# Patient Record
Sex: FEMALE
[De-identification: names, ages, dates, MRNs, and addresses within clinical notes are randomized per-mention and may not be internally consistent; named-entity substitution may affect disease eponyms.]

---

## 2017-12-15 ENCOUNTER — CHARTING TRANS (OUTPATIENT)
Dept: OTHER | Age: 57
End: 2017-12-15

## 2017-12-18 ENCOUNTER — CHARTING TRANS (OUTPATIENT)
Dept: OTHER | Age: 57
End: 2017-12-18

## 2018-01-30 ENCOUNTER — NURSE TRIAGE (OUTPATIENT)
Dept: OTHER | Age: 58
End: 2018-01-30

## 2018-01-30 ENCOUNTER — OFFICE VISIT (OUTPATIENT)
Dept: FAMILY MEDICINE CLINIC | Facility: CLINIC | Age: 58
End: 2018-01-30

## 2018-01-30 VITALS
HEART RATE: 72 BPM | HEIGHT: 67 IN | DIASTOLIC BLOOD PRESSURE: 76 MMHG | SYSTOLIC BLOOD PRESSURE: 115 MMHG | TEMPERATURE: 98 F | BODY MASS INDEX: 27.47 KG/M2 | WEIGHT: 175 LBS

## 2018-01-30 DIAGNOSIS — J32.9 OTHER SINUSITIS, UNSPECIFIED CHRONICITY: Primary | ICD-10-CM

## 2018-01-30 PROCEDURE — 99212 OFFICE O/P EST SF 10 MIN: CPT | Performed by: FAMILY MEDICINE

## 2018-01-30 PROCEDURE — 99202 OFFICE O/P NEW SF 15 MIN: CPT | Performed by: FAMILY MEDICINE

## 2018-01-30 RX ORDER — CODEINE PHOSPHATE AND GUAIFENESIN 10; 100 MG/5ML; MG/5ML
10 SOLUTION ORAL EVERY 6 HOURS PRN
Qty: 236 ML | Refills: 0 | Status: SHIPPED | OUTPATIENT
Start: 2018-01-30 | End: 2018-03-07

## 2018-01-30 RX ORDER — AMOXICILLIN AND CLAVULANATE POTASSIUM 875; 125 MG/1; MG/1
1 TABLET, FILM COATED ORAL 2 TIMES DAILY
Qty: 20 TABLET | Refills: 0 | Status: SHIPPED | OUTPATIENT
Start: 2018-01-30 | End: 2018-02-09

## 2018-01-30 NOTE — TELEPHONE ENCOUNTER
Action Requested: Summary for Provider     []  Critical Lab, Recommendations Needed  [] Need Additional Advice  []   FYI    []   Need Orders  [] Need Medications Sent to Pharmacy  []  Other     SUMMARY: Pt stts has a productive cough,fever and nasal conges

## 2018-01-30 NOTE — PROGRESS NOTES
Blood pressure 115/76, pulse 72, temperature 98 °F (36.7 °C), temperature source Oral, height 5' 7\" (1.702 m), weight 175 lb (79.4 kg).     Patient presents today complaining of a one-week history of nocturnal cough some facial pressure fever 103 yesterday

## 2018-03-06 ENCOUNTER — APPOINTMENT (OUTPATIENT)
Dept: CT IMAGING | Facility: HOSPITAL | Age: 58
End: 2018-03-06
Attending: NURSE PRACTITIONER
Payer: MEDICAID

## 2018-03-06 ENCOUNTER — HOSPITAL ENCOUNTER (EMERGENCY)
Facility: HOSPITAL | Age: 58
Discharge: HOME | End: 2018-03-06
Payer: MEDICAID

## 2018-03-06 ENCOUNTER — NURSE TRIAGE (OUTPATIENT)
Dept: OTHER | Age: 58
End: 2018-03-06

## 2018-03-06 VITALS
SYSTOLIC BLOOD PRESSURE: 120 MMHG | RESPIRATION RATE: 18 BRPM | BODY MASS INDEX: 27.49 KG/M2 | DIASTOLIC BLOOD PRESSURE: 66 MMHG | HEART RATE: 62 BPM | OXYGEN SATURATION: 99 % | TEMPERATURE: 98 F | WEIGHT: 165 LBS | HEIGHT: 65 IN

## 2018-03-06 DIAGNOSIS — S09.90XA INJURY OF HEAD, INITIAL ENCOUNTER: Primary | ICD-10-CM

## 2018-03-06 PROCEDURE — 99284 EMERGENCY DEPT VISIT MOD MDM: CPT

## 2018-03-06 PROCEDURE — 72125 CT NECK SPINE W/O DYE: CPT | Performed by: NURSE PRACTITIONER

## 2018-03-06 PROCEDURE — 70450 CT HEAD/BRAIN W/O DYE: CPT | Performed by: NURSE PRACTITIONER

## 2018-03-06 NOTE — ED INITIAL ASSESSMENT (HPI)
Pt reports being hit on the head with a football. Pt reports feeling dizzy, and has headache.  Pt denies LOC at that time

## 2018-03-06 NOTE — TELEPHONE ENCOUNTER
Last night at 6:30. Pt hit her head football the side of her head. 2 advil and ice Pt feels confused, nausous and dizzy when she walks. There is not bump but its painful.

## 2018-03-06 NOTE — ED NOTES
Practitioner at bedside to discuss results. Pt up ambulatory with steady gait, awaiting discharge paperwork.

## 2018-03-06 NOTE — TELEPHONE ENCOUNTER
Action Requested: Summary for Provider     []  Critical Lab, Recommendations Needed  [] Need Additional Advice  []   FYI    []   Need Orders  [] Need Medications Sent to Pharmacy  []  Other     SUMMARY: Pt will go to ER. ER f/u tomorrow.    Pt stated that l

## 2018-03-07 ENCOUNTER — OFFICE VISIT (OUTPATIENT)
Dept: FAMILY MEDICINE CLINIC | Facility: CLINIC | Age: 58
End: 2018-03-07

## 2018-03-07 VITALS
TEMPERATURE: 98 F | HEART RATE: 70 BPM | HEIGHT: 67 IN | SYSTOLIC BLOOD PRESSURE: 126 MMHG | DIASTOLIC BLOOD PRESSURE: 76 MMHG

## 2018-03-07 DIAGNOSIS — G44.319 ACUTE POST-TRAUMATIC HEADACHE, NOT INTRACTABLE: ICD-10-CM

## 2018-03-07 DIAGNOSIS — S06.0X0D CONCUSSION WITHOUT LOSS OF CONSCIOUSNESS, SUBSEQUENT ENCOUNTER: Primary | ICD-10-CM

## 2018-03-07 PROCEDURE — 99214 OFFICE O/P EST MOD 30 MIN: CPT | Performed by: NURSE PRACTITIONER

## 2018-03-07 RX ORDER — ACETAMINOPHEN AND CODEINE PHOSPHATE 300; 30 MG/1; MG/1
1 TABLET ORAL EVERY 6 HOURS PRN
Qty: 30 TABLET | Refills: 0 | Status: SHIPPED | OUTPATIENT
Start: 2018-03-07 | End: 2018-04-09 | Stop reason: ALTCHOICE

## 2018-03-07 NOTE — PATIENT INSTRUCTIONS
Concussion    A concussion can be caused by a direct blow to the head, neck, face, or somewhere else on the body with the force being transmitted to the head. This may cause you to lose consciousness – be \"knocked out\" - but not always.  Depending on th ¨ Don’t drink alcohol or take sedatives or medicines that make you sleepy. ¨ Don’t drive or operate machinery. ¨ Avoid doing anything strenuous. Don’t lift or strain.   · Don’t return to sports or any activity that could cause you to hit your head until a You have been seen in the Emergency Department today for a head injury and diagnosed with a concussion. A head injury of any severity can cause you to have a concussion. A concussion is an injury to your brain that usually heals by itself with time.  Most p Stage 5 (Full contact sport): You MUST be seen by a healthcare provider (family physician, nurse practitioner, etc.), before participating high risk sports such as football, hockey, soccer, basketball, gymnastics.     Tips to help you get better:    Get ple · Don't do activities that need a lot of concentration or a lot of attention. This will allow your brain to rest and heal more quickly. · Return to regular physical and mental activity as directed and approved by your healthcare provider.   Tips about slee

## 2018-03-07 NOTE — PROGRESS NOTES
HPI  Pt presents for f/u from ER yesterday-was hit in right side of head by a thrown football. Pt feeling very fatigued, weak, headache and at times disoriented and dizziness with nausea. Having a hard time sleeping due to pain in head.      Review of Syste status: Current Every Day Smoker  0.50 Packs/day     Smokeless tobacco: Never Used    Alcohol use No    Drug use: No    Sexual activity: Not on file     Other Topics Concern   None on file     Social History Narrative   None on file         Current Outpati is normal. Her mood appears anxious. Her affect is labile. She exhibits a depressed mood. She exhibits normal recent memory and normal remote memory. Nursing note and vitals reviewed. Assessment:     1.  Concussion without loss of consciousness, subs

## 2018-03-07 NOTE — TELEPHONE ENCOUNTER
Pt stated that she did go to ER and was inform that she had a concussion. She already has a appt to see Anne Marie Rafat today someone will drive her to the appt.

## 2018-03-12 ENCOUNTER — OFFICE VISIT (OUTPATIENT)
Dept: FAMILY MEDICINE CLINIC | Facility: CLINIC | Age: 58
End: 2018-03-12

## 2018-03-12 ENCOUNTER — NURSE TRIAGE (OUTPATIENT)
Dept: FAMILY MEDICINE CLINIC | Facility: CLINIC | Age: 58
End: 2018-03-12

## 2018-03-12 VITALS
HEART RATE: 80 BPM | DIASTOLIC BLOOD PRESSURE: 75 MMHG | TEMPERATURE: 98 F | SYSTOLIC BLOOD PRESSURE: 118 MMHG | BODY MASS INDEX: 27 KG/M2 | WEIGHT: 172 LBS | HEIGHT: 67 IN | RESPIRATION RATE: 18 BRPM

## 2018-03-12 DIAGNOSIS — S06.0X0D CONCUSSION WITHOUT LOSS OF CONSCIOUSNESS, SUBSEQUENT ENCOUNTER: ICD-10-CM

## 2018-03-12 DIAGNOSIS — S09.90XA TRAUMATIC INJURY OF HEAD, INITIAL ENCOUNTER: ICD-10-CM

## 2018-03-12 DIAGNOSIS — S00.93XD CONTUSION OF HEAD, UNSPECIFIED PART OF HEAD, SUBSEQUENT ENCOUNTER: Primary | ICD-10-CM

## 2018-03-12 DIAGNOSIS — G44.209 TENSION HEADACHE: ICD-10-CM

## 2018-03-12 DIAGNOSIS — M54.2 BILATERAL POSTERIOR NECK PAIN: ICD-10-CM

## 2018-03-12 PROCEDURE — 99212 OFFICE O/P EST SF 10 MIN: CPT | Performed by: FAMILY MEDICINE

## 2018-03-12 PROCEDURE — 99215 OFFICE O/P EST HI 40 MIN: CPT | Performed by: FAMILY MEDICINE

## 2018-03-12 RX ORDER — NABUMETONE 750 MG/1
750 TABLET, FILM COATED ORAL 2 TIMES DAILY
Qty: 60 TABLET | Refills: 0 | Status: SHIPPED | OUTPATIENT
Start: 2018-03-12 | End: 2018-03-19

## 2018-03-12 RX ORDER — CYCLOBENZAPRINE HCL 10 MG
10 TABLET ORAL NIGHTLY
Qty: 30 TABLET | Refills: 0 | Status: SHIPPED | OUTPATIENT
Start: 2018-03-12 | End: 2018-03-19

## 2018-03-12 RX ORDER — IBUPROFEN 200 MG
400 TABLET ORAL EVERY 4 HOURS PRN
COMMUNITY
End: 2018-03-19

## 2018-03-12 NOTE — TELEPHONE ENCOUNTER
Action Requested: Summary for Provider     []  Critical Lab, Recommendations Needed  [] Need Additional Advice  []   FYI    []   Need Orders  [] Need Medications Sent to Pharmacy  []  Other     SUMMARY: APPT CREATED, headache, dizziness, recent concussion

## 2018-03-12 NOTE — PROGRESS NOTES
Patient ID: Rut Meza is a 62year old female. HPI  Patient presents with:  Headache: Patient present for c/o headaches, pt had recent concussion. Pain 8/10 to headache.  Pt c/o insomnia     Action Requested: Summary for Provider     []  Critical L Pt c/o a 7/10 headache that began yesterday at about 6:30 PM after she was hit in the head with a football. 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 Reviewed the emergency room note. She was going to call him on as she is a nanny for 3 curls. She was going to pick someone up when 2 teenagers were in the building playing football.   There was a small hard football and she walked right into their throw Psychiatric/Behavioral: Positive for decreased concentration and sleep disturbance (hartd to get comfortable. ). Negative for dysphoric mood. The patient is not nervous/anxious.           Past Medical History:   Diagnosis Date   • Hyperlipidemia        Past Pulmonary/Chest: Effort normal and breath sounds normal. No respiratory distress. Lymphadenopathy:     Patient has no cervical adenopathy.    Neurological: Patient is alert and oriented to person, place, and time   Patient has normal strength and normal r -     Nabumetone 750 MG Oral Tab; Take 1 tablet (750 mg total) by mouth 2 (two) times daily. Take with meals. (for pain/inflammation). Return in about 1 week (around 3/19/2018).       Referrals (if applicable)  No orders of the defined types were placed

## 2018-03-19 ENCOUNTER — OFFICE VISIT (OUTPATIENT)
Dept: FAMILY MEDICINE CLINIC | Facility: CLINIC | Age: 58
End: 2018-03-19

## 2018-03-19 DIAGNOSIS — S06.0X0D CONCUSSION WITHOUT LOSS OF CONSCIOUSNESS, SUBSEQUENT ENCOUNTER: ICD-10-CM

## 2018-03-19 DIAGNOSIS — R51.9 RIGHT-SIDED HEADACHE: ICD-10-CM

## 2018-03-19 DIAGNOSIS — M54.2 NECK PAIN: ICD-10-CM

## 2018-03-19 DIAGNOSIS — H57.11 ACUTE RIGHT EYE PAIN: ICD-10-CM

## 2018-03-19 DIAGNOSIS — G44.209 TENSION HEADACHE: Primary | ICD-10-CM

## 2018-03-19 PROBLEM — S06.0X0A CONCUSSION WITH NO LOSS OF CONSCIOUSNESS: Status: ACTIVE | Noted: 2018-03-19

## 2018-03-19 PROCEDURE — 99212 OFFICE O/P EST SF 10 MIN: CPT | Performed by: FAMILY MEDICINE

## 2018-03-19 PROCEDURE — 99215 OFFICE O/P EST HI 40 MIN: CPT | Performed by: FAMILY MEDICINE

## 2018-03-19 RX ORDER — AMITRIPTYLINE HYDROCHLORIDE 10 MG/1
TABLET, FILM COATED ORAL
Qty: 60 TABLET | Refills: 1 | Status: SHIPPED | OUTPATIENT
Start: 2018-03-19 | End: 2018-04-09 | Stop reason: ALTCHOICE

## 2018-03-19 RX ORDER — DICLOFENAC SODIUM 75 MG/1
75 TABLET, DELAYED RELEASE ORAL 2 TIMES DAILY
Qty: 42 TABLET | Refills: 0 | Status: SHIPPED | OUTPATIENT
Start: 2018-03-19 | End: 2018-04-09

## 2018-03-19 NOTE — PROGRESS NOTES
Patient ID: Renata Baez is a 62year old female. HPI  No chief complaint on file. She is here to follow-up on her with right-sided head pain after she was hit with a football on the right temple.   She states she has a pulling sensation in her rig SURGERY      Comment: low back surgery for herniated disc.    1997: LAPAROSCOPIC CHOLECYSTECTOMY  1998: SINUS SURGERY        Comment: nasal sinus surgery  1967: TONSILLECTOMY      Comment: and adenoidectomy         Current Outpatient Prescriptions: total) by mouth 2 (two) times daily. Take with meals. (for pain/inflammation). -     Amitriptyline HCl 10 MG Oral Tab; 1 by mouth at nighttime. Can start taking 2 at nighttime if needed after 1 week.   -     NEURO - INTERNAL  Try diclofenac and amitriptyl INTERNAL  -     NEURO - INTERNAL        Referrals (if applicable)    Orders Placed This Encounter      Ophthalmology - Dr Robert Felipe Comments:              ([address])          Referral Priority: Routine          Referral Type: E/M Services

## 2018-03-20 VITALS
HEIGHT: 67 IN | HEART RATE: 76 BPM | BODY MASS INDEX: 26.21 KG/M2 | TEMPERATURE: 98 F | DIASTOLIC BLOOD PRESSURE: 79 MMHG | SYSTOLIC BLOOD PRESSURE: 131 MMHG | WEIGHT: 167 LBS

## 2018-03-20 RX ORDER — PREDNISONE 5 MG/1
TABLET ORAL
Refills: 0 | COMMUNITY
Start: 2018-01-17 | End: 2018-04-09 | Stop reason: ALTCHOICE

## 2018-03-21 ENCOUNTER — OFFICE VISIT (OUTPATIENT)
Dept: OPHTHALMOLOGY | Facility: CLINIC | Age: 58
End: 2018-03-21

## 2018-03-21 DIAGNOSIS — H25.13 AGE-RELATED NUCLEAR CATARACT OF BOTH EYES: ICD-10-CM

## 2018-03-21 DIAGNOSIS — S06.0X0D CONCUSSION WITHOUT LOSS OF CONSCIOUSNESS, SUBSEQUENT ENCOUNTER: ICD-10-CM

## 2018-03-21 DIAGNOSIS — H57.11 EYE PAIN, RIGHT: Primary | ICD-10-CM

## 2018-03-21 PROCEDURE — 99243 OFF/OP CNSLTJ NEW/EST LOW 30: CPT | Performed by: OPHTHALMOLOGY

## 2018-03-21 PROCEDURE — 99212 OFFICE O/P EST SF 10 MIN: CPT | Performed by: OPHTHALMOLOGY

## 2018-03-21 NOTE — ASSESSMENT & PLAN NOTE
No cause found for right eye pain. Reassured patient that there is no infection, inflammation, foreign body, abrasion or increased pressure in the eyes. Continue to follow up PCP and see neurology as planned.   She has an appointment with neurology on

## 2018-03-21 NOTE — PATIENT INSTRUCTIONS
Eye pain, right   No cause found for right eye pain. Reassured patient that there is no infection, inflammation, foreign body, abrasion or increased pressure in the eyes. Continue to follow up PCP and see neurology as planned.   She has an appointment w

## 2018-03-21 NOTE — PROGRESS NOTES
Tiburcio Louise is a 62year old female. HPI:     HPI     Consult    Additional comments: Per Dr. Sona Pink   NP.   Pt states that she got hit with a football on the right side of her head 2 weeks ago Pt went to [facility] and was told s after 1 week. Disp: 60 tablet Rfl: 1   Acetaminophen-Codeine (TYLENOL WITH CODEINE #3) 300-30 MG Oral Tab Take 1 tablet by mouth every 6 (six) hours as needed for Pain.  Disp: 30 tablet Rfl: 0       Allergies:  No Known Allergies    ROS:     ROS     Positiv Pt is happy with reading glasses                 ASSESSMENT/PLAN:     Diagnoses and Plan:     Eye pain, right   No cause found for right eye pain.   Reassured patient that there is no infection, inflammation, foreign body, abrasion or increased pressure

## 2018-04-09 ENCOUNTER — OFFICE VISIT (OUTPATIENT)
Dept: FAMILY MEDICINE CLINIC | Facility: CLINIC | Age: 58
End: 2018-04-09

## 2018-04-09 ENCOUNTER — LAB ENCOUNTER (OUTPATIENT)
Dept: LAB | Age: 58
End: 2018-04-09
Attending: FAMILY MEDICINE
Payer: MEDICAID

## 2018-04-09 VITALS
HEART RATE: 86 BPM | BODY MASS INDEX: 27 KG/M2 | WEIGHT: 169.19 LBS | TEMPERATURE: 98 F | SYSTOLIC BLOOD PRESSURE: 120 MMHG | DIASTOLIC BLOOD PRESSURE: 73 MMHG

## 2018-04-09 DIAGNOSIS — R51.9 RIGHT-SIDED HEADACHE: ICD-10-CM

## 2018-04-09 DIAGNOSIS — K29.70 GASTRITIS WITHOUT BLEEDING, UNSPECIFIED CHRONICITY, UNSPECIFIED GASTRITIS TYPE: ICD-10-CM

## 2018-04-09 DIAGNOSIS — G44.209 TENSION HEADACHE: ICD-10-CM

## 2018-04-09 DIAGNOSIS — M54.2 NECK PAIN: ICD-10-CM

## 2018-04-09 DIAGNOSIS — H57.11 PAIN IN RIGHT EYE: ICD-10-CM

## 2018-04-09 DIAGNOSIS — H57.11 ACUTE RIGHT EYE PAIN: ICD-10-CM

## 2018-04-09 DIAGNOSIS — B00.9 HERPES SIMPLEX: ICD-10-CM

## 2018-04-09 DIAGNOSIS — R51.9 HEADACHE, UNSPECIFIED HEADACHE TYPE: ICD-10-CM

## 2018-04-09 DIAGNOSIS — G44.209 TENSION HEADACHE: Primary | ICD-10-CM

## 2018-04-09 DIAGNOSIS — F41.9 ANXIETY: ICD-10-CM

## 2018-04-09 DIAGNOSIS — S06.0X0D CONCUSSION WITHOUT LOSS OF CONSCIOUSNESS, SUBSEQUENT ENCOUNTER: ICD-10-CM

## 2018-04-09 PROCEDURE — 86695 HERPES SIMPLEX TYPE 1 TEST: CPT

## 2018-04-09 PROCEDURE — 36415 COLL VENOUS BLD VENIPUNCTURE: CPT

## 2018-04-09 PROCEDURE — 86694 HERPES SIMPLEX NES ANTBDY: CPT

## 2018-04-09 PROCEDURE — 99212 OFFICE O/P EST SF 10 MIN: CPT | Performed by: FAMILY MEDICINE

## 2018-04-09 PROCEDURE — 86696 HERPES SIMPLEX TYPE 2 TEST: CPT

## 2018-04-09 PROCEDURE — 99215 OFFICE O/P EST HI 40 MIN: CPT | Performed by: FAMILY MEDICINE

## 2018-04-09 RX ORDER — IBUPROFEN 600 MG/1
600 TABLET ORAL AS NEEDED
COMMUNITY
End: 2018-04-09

## 2018-04-09 RX ORDER — OMEPRAZOLE 40 MG/1
40 CAPSULE, DELAYED RELEASE ORAL DAILY
Qty: 30 CAPSULE | Refills: 2 | Status: SHIPPED | OUTPATIENT
Start: 2018-04-09 | End: 2018-05-30

## 2018-04-09 RX ORDER — ACYCLOVIR 800 MG/1
800 TABLET ORAL 3 TIMES DAILY
Qty: 15 TABLET | Refills: 0 | Status: SHIPPED | OUTPATIENT
Start: 2018-04-09 | End: 2018-12-03

## 2018-04-09 NOTE — PROGRESS NOTES
Patient ID: Cely Deleon is a 62year old female. HPI  Patient presents with: Follow - Up    She called today and stated she wanted to be worked in to follow-up on her head injury.   She states she really needs a note to be able to drive the children compensation for her discomfort.     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 Negative for dizziness. Past Medical History:   Diagnosis Date   • Hyperlipidemia        Past Surgical History:  1970: APPENDECTOMY  1996: BACK SURGERY      Comment: low back surgery for herniated disc.    1997: LAPAROSCOPIC normal   Skin: Skin is warm. On the right buttock she has a quarter sized area of grouped vesicles. There is only one lesion. Psychiatric: Patient has a anxious affect. Patient speech is normal    Vitals reviewed.          ASSESSMENT/PLAN:     Diagnose

## 2018-04-12 RX ORDER — DICLOFENAC SODIUM 75 MG/1
TABLET, DELAYED RELEASE ORAL
Qty: 42 TABLET | Refills: 0 | Status: SHIPPED | OUTPATIENT
Start: 2018-04-12 | End: 2018-04-25

## 2018-04-12 NOTE — TELEPHONE ENCOUNTER
Refilled per protocol.   Refill Protocol Appointment Criteria  · Appointment scheduled in the past 6 months or in the next 3 months  Recent Outpatient Visits            3 days ago Tension headache    [address], [address], [place], [place]

## 2018-04-14 ENCOUNTER — TELEPHONE (OUTPATIENT)
Dept: OTHER | Age: 58
End: 2018-04-14

## 2018-04-14 NOTE — TELEPHONE ENCOUNTER
Spoke with patient and relayed VS message below--patient verbalizes understanding and agreement. F/U appt made for Thursday, 4/19/18 at 4:20 with VS to discuss.     Notes recorded by Tiffanie Mena DO on 4/12/2018 at 1:24 PM CDT  You can let patient know t

## 2018-04-19 ENCOUNTER — OFFICE VISIT (OUTPATIENT)
Dept: FAMILY MEDICINE CLINIC | Facility: CLINIC | Age: 58
End: 2018-04-19

## 2018-04-19 VITALS
BODY MASS INDEX: 26.53 KG/M2 | HEIGHT: 67 IN | HEART RATE: 89 BPM | SYSTOLIC BLOOD PRESSURE: 100 MMHG | DIASTOLIC BLOOD PRESSURE: 69 MMHG | WEIGHT: 169 LBS | TEMPERATURE: 98 F

## 2018-04-19 DIAGNOSIS — A60.00 GENITAL HERPES SIMPLEX, UNSPECIFIED SITE: ICD-10-CM

## 2018-04-19 DIAGNOSIS — S00.93XD CONTUSION OF HEAD, UNSPECIFIED PART OF HEAD, SUBSEQUENT ENCOUNTER: ICD-10-CM

## 2018-04-19 DIAGNOSIS — R51.9 RIGHT-SIDED HEADACHE: ICD-10-CM

## 2018-04-19 DIAGNOSIS — Z12.31 VISIT FOR SCREENING MAMMOGRAM: ICD-10-CM

## 2018-04-19 DIAGNOSIS — N64.4 MASTALGIA IN FEMALE: Primary | ICD-10-CM

## 2018-04-19 PROCEDURE — 99214 OFFICE O/P EST MOD 30 MIN: CPT | Performed by: FAMILY MEDICINE

## 2018-04-19 PROCEDURE — 99212 OFFICE O/P EST SF 10 MIN: CPT | Performed by: FAMILY MEDICINE

## 2018-04-19 RX ORDER — ACYCLOVIR 800 MG/1
TABLET ORAL
Refills: 0 | COMMUNITY
Start: 2018-04-09 | End: 2018-04-19

## 2018-04-19 NOTE — PROGRESS NOTES
Patient ID: Asael Pitt is a 62year old female. HPI  Patient presents with:  Lab Results  Breast Pain    She states that the medicine I gave her which was acyclovir for the lesion on her right buttock worked wonderfully and resolved her issue.   She adenoidectomy       Current Outpatient Prescriptions:  acyclovir 800 MG Oral Tab TK 1 T PO TID Disp:  Rfl: 0   DICLOFENAC SODIUM 75 MG Oral Tab EC TAKE 1 TABLET BY MOUTH TWICE DAILY WITH MEALS( FOR PAIN/ INFLAMMATION) Disp: 42 tablet Rfl: 0   Omeprazole 40 encounter  As above  Visit for screening mammogram  -     [facility] (CPT=77066);  Future        Referrals (if applicable)    Orders Placed This Encounter      Neuro - Dr Molly Langston          Order Comments:              If she is booked, you can

## 2018-04-20 ENCOUNTER — HOSPITAL ENCOUNTER (OUTPATIENT)
Dept: MAMMOGRAPHY | Facility: HOSPITAL | Age: 58
Discharge: HOME | End: 2018-04-20
Attending: FAMILY MEDICINE
Payer: MEDICAID

## 2018-04-20 ENCOUNTER — HOSPITAL ENCOUNTER (OUTPATIENT)
Dept: ULTRASOUND IMAGING | Facility: HOSPITAL | Age: 58
Discharge: HOME | End: 2018-04-20
Attending: FAMILY MEDICINE
Payer: MEDICAID

## 2018-04-20 DIAGNOSIS — N64.4 MASTALGIA IN FEMALE: ICD-10-CM

## 2018-04-20 DIAGNOSIS — Z12.31 VISIT FOR SCREENING MAMMOGRAM: ICD-10-CM

## 2018-04-20 PROCEDURE — 77066 DX MAMMO INCL CAD BI: CPT | Performed by: FAMILY MEDICINE

## 2018-04-20 PROCEDURE — 76642 ULTRASOUND BREAST LIMITED: CPT | Performed by: FAMILY MEDICINE

## 2018-04-25 ENCOUNTER — OFFICE VISIT (OUTPATIENT)
Dept: NEUROLOGY | Facility: CLINIC | Age: 58
End: 2018-04-25

## 2018-04-25 VITALS
SYSTOLIC BLOOD PRESSURE: 100 MMHG | HEART RATE: 60 BPM | RESPIRATION RATE: 16 BRPM | BODY MASS INDEX: 28.16 KG/M2 | WEIGHT: 169 LBS | HEIGHT: 65 IN | DIASTOLIC BLOOD PRESSURE: 68 MMHG

## 2018-04-25 DIAGNOSIS — R51.9 HEADACHE DISORDER: Primary | ICD-10-CM

## 2018-04-25 PROCEDURE — 99244 OFF/OP CNSLTJ NEW/EST MOD 40: CPT | Performed by: OTHER

## 2018-04-25 RX ORDER — TOPIRAMATE 25 MG/1
TABLET ORAL
Qty: 120 TABLET | Refills: 3 | Status: SHIPPED | OUTPATIENT
Start: 2018-04-25 | End: 2019-03-14

## 2018-04-25 RX ORDER — GABAPENTIN 100 MG/1
CAPSULE ORAL
Qty: 90 CAPSULE | Refills: 3 | Status: SHIPPED | OUTPATIENT
Start: 2018-04-25 | End: 2019-03-14

## 2018-04-25 NOTE — PROGRESS NOTES
Epic records, ER records, CT that, cervical spine reviewed. 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 No loss of consciousness.   No nasal sinus surgery  1967: TONSILLECTOMY      Comment: and adenoidectomy   Family History   Problem Relation Age of Onset   • Heart Disorder Father    • Schizophrenia Mother    • Cancer Daughter      leukemia   • Cancer Brother       Social History:  Senthil Coello and reactive, Extra Ocular movements intact, face symmetric, tongue midline, V1-V3 intact bilaterally. Cranial Nerves 3-7,9-12 are normal. No nystagmus. Motor: 5/5 strength in the upper and lower extremities.   No pronator drift, no tremor or increased to

## 2018-05-30 ENCOUNTER — OFFICE VISIT (OUTPATIENT)
Dept: NEUROLOGY | Facility: CLINIC | Age: 58
End: 2018-05-30

## 2018-05-30 VITALS
BODY MASS INDEX: 27.49 KG/M2 | HEART RATE: 76 BPM | SYSTOLIC BLOOD PRESSURE: 110 MMHG | WEIGHT: 165 LBS | DIASTOLIC BLOOD PRESSURE: 66 MMHG | HEIGHT: 65 IN

## 2018-05-30 DIAGNOSIS — S14.0XXD: Primary | ICD-10-CM

## 2018-05-30 PROCEDURE — 99214 OFFICE O/P EST MOD 30 MIN: CPT | Performed by: OTHER

## 2018-05-30 RX ORDER — NORTRIPTYLINE HYDROCHLORIDE 10 MG/1
CAPSULE ORAL
Qty: 60 CAPSULE | Refills: 3 | Status: SHIPPED | OUTPATIENT
Start: 2018-05-30 | End: 2019-03-14

## 2018-05-30 RX ORDER — NORTRIPTYLINE HYDROCHLORIDE 10 MG/1
CAPSULE ORAL
Qty: 60 CAPSULE | Refills: 3 | Status: SHIPPED | OUTPATIENT
Start: 2018-05-30 | End: 2018-05-30

## 2018-05-30 RX ORDER — OMEPRAZOLE 40 MG/1
40 CAPSULE, DELAYED RELEASE ORAL DAILY
Qty: 30 CAPSULE | Refills: 0 | Status: SHIPPED | OUTPATIENT
Start: 2018-05-30 | End: 2019-03-14

## 2018-05-30 RX ORDER — IBUPROFEN 200 MG
200 TABLET ORAL EVERY 6 HOURS PRN
COMMUNITY
End: 2018-12-11 | Stop reason: DRUGHIGH

## 2018-05-31 NOTE — PROGRESS NOTES
After very long discussion with her and her sister, great length was expanded to try to determine which medication she stopped. It appears that she may have stopped Topamax because of fatigue.   It is still not clear whether she is continuing the Neurontin

## 2018-06-21 ENCOUNTER — APPOINTMENT (OUTPATIENT)
Dept: PHYSICAL THERAPY | Age: 58
End: 2018-06-21
Attending: PHYSICAL MEDICINE & REHABILITATION
Payer: MEDICAID

## 2018-06-27 ENCOUNTER — APPOINTMENT (OUTPATIENT)
Dept: PHYSICAL THERAPY | Age: 58
End: 2018-06-27
Attending: PHYSICAL MEDICINE & REHABILITATION
Payer: MEDICAID

## 2018-07-03 ENCOUNTER — APPOINTMENT (OUTPATIENT)
Dept: PHYSICAL THERAPY | Age: 58
End: 2018-07-03
Attending: PHYSICAL MEDICINE & REHABILITATION
Payer: MEDICAID

## 2018-07-09 ENCOUNTER — APPOINTMENT (OUTPATIENT)
Dept: PHYSICAL THERAPY | Age: 58
End: 2018-07-09
Attending: PHYSICAL MEDICINE & REHABILITATION
Payer: MEDICAID

## 2018-07-12 ENCOUNTER — APPOINTMENT (OUTPATIENT)
Dept: PHYSICAL THERAPY | Age: 58
End: 2018-07-12
Attending: PHYSICAL MEDICINE & REHABILITATION
Payer: MEDICAID

## 2018-07-16 ENCOUNTER — APPOINTMENT (OUTPATIENT)
Dept: PHYSICAL THERAPY | Age: 58
End: 2018-07-16
Attending: PHYSICAL MEDICINE & REHABILITATION
Payer: MEDICAID

## 2018-07-18 ENCOUNTER — APPOINTMENT (OUTPATIENT)
Dept: PHYSICAL THERAPY | Age: 58
End: 2018-07-18
Attending: PHYSICAL MEDICINE & REHABILITATION
Payer: MEDICAID

## 2018-07-23 ENCOUNTER — APPOINTMENT (OUTPATIENT)
Dept: PHYSICAL THERAPY | Age: 58
End: 2018-07-23
Attending: PHYSICAL MEDICINE & REHABILITATION
Payer: MEDICAID

## 2018-07-25 ENCOUNTER — APPOINTMENT (OUTPATIENT)
Dept: PHYSICAL THERAPY | Age: 58
End: 2018-07-25
Attending: PHYSICAL MEDICINE & REHABILITATION
Payer: MEDICAID

## 2018-11-02 VITALS
WEIGHT: 176.26 LBS | HEIGHT: 66 IN | DIASTOLIC BLOOD PRESSURE: 78 MMHG | HEART RATE: 64 BPM | BODY MASS INDEX: 28.33 KG/M2 | SYSTOLIC BLOOD PRESSURE: 116 MMHG

## 2018-11-27 ENCOUNTER — OFFICE VISIT (OUTPATIENT)
Dept: FAMILY MEDICINE CLINIC | Facility: CLINIC | Age: 58
End: 2018-11-27
Payer: MEDICAID

## 2018-11-27 VITALS
HEART RATE: 80 BPM | BODY MASS INDEX: 27.6 KG/M2 | HEIGHT: 65 IN | RESPIRATION RATE: 14 BRPM | TEMPERATURE: 98 F | DIASTOLIC BLOOD PRESSURE: 79 MMHG | SYSTOLIC BLOOD PRESSURE: 118 MMHG | WEIGHT: 165.63 LBS

## 2018-11-27 DIAGNOSIS — Z00.00 WELL ADULT EXAM: Primary | ICD-10-CM

## 2018-11-27 DIAGNOSIS — M79.605 LEG PAIN, BILATERAL: ICD-10-CM

## 2018-11-27 DIAGNOSIS — M54.16 LUMBAR RADICULOPATHY, CHRONIC: ICD-10-CM

## 2018-11-27 DIAGNOSIS — Z98.890 HISTORY OF BACK SURGERY: ICD-10-CM

## 2018-11-27 DIAGNOSIS — Z87.19 HISTORY OF HIATAL HERNIA: ICD-10-CM

## 2018-11-27 DIAGNOSIS — M79.604 LEG PAIN, BILATERAL: ICD-10-CM

## 2018-11-27 DIAGNOSIS — E66.3 OVERWEIGHT (BMI 25.0-29.9): ICD-10-CM

## 2018-11-27 PROCEDURE — 99396 PREV VISIT EST AGE 40-64: CPT | Performed by: FAMILY MEDICINE

## 2018-11-27 NOTE — PROGRESS NOTES
HPI:   Carole Bruce is a 62year old female who presents for a complete physical exam. Symptoms: is menopausal.    Patient seen the first time.   She does mention she has a history of back surgery in the past and also in January 2018 she slipped on black capsule (40 mg total) by mouth daily.  Disp: 30 capsule Rfl: 0      Past Medical History:   Diagnosis Date   • Hyperlipidemia    •  (normal spontaneous vaginal delivery)     2      Past Surgical History:   Procedure Laterality Date   • APPENDECTOMY  197 anemia  ENDOCRINE: denies thyroid history  ALL/ASTHMA: denies hx of allergy or asthma    EXAM:   /79   Pulse 80   Temp 97.9 °F (36.6 °C) (Oral)   Resp 14   Ht 5' 5\" (1.651 m)   Wt 165 lb 9.6 oz (75.1 kg)   Breastfeeding?  No   BMI 27.56 kg/m²   Body vegetable and fruit intake. Recommending avoiding foods high in fat content. Recommend exercising at least 30-40 minutes 5-6 days a week. Avoid skipping meals. Making healthy choices for snacks and also limiting sugary beverages.         3. History of b

## 2018-12-03 ENCOUNTER — TELEPHONE (OUTPATIENT)
Dept: OTHER | Age: 58
End: 2018-12-03

## 2018-12-03 DIAGNOSIS — B00.9 HERPES SIMPLEX: ICD-10-CM

## 2018-12-03 RX ORDER — ACYCLOVIR 800 MG/1
800 TABLET ORAL 3 TIMES DAILY
Qty: 15 TABLET | Refills: 0 | Status: SHIPPED | OUTPATIENT
Start: 2018-12-03 | End: 2018-12-08

## 2018-12-03 NOTE — TELEPHONE ENCOUNTER
Patient with complaints of herpes outbreak to right buttock and patient is requesting script for acyclovir. Patient states she saw Dr. Charmayne Maser in April with same symptoms and states the acyclovir really helped. Requesting refill. Script pended.  Please advis

## 2018-12-05 ENCOUNTER — LAB ENCOUNTER (OUTPATIENT)
Dept: LAB | Age: 58
End: 2018-12-05
Attending: FAMILY MEDICINE
Payer: MEDICAID

## 2018-12-05 ENCOUNTER — HOSPITAL ENCOUNTER (OUTPATIENT)
Dept: MAMMOGRAPHY | Facility: HOSPITAL | Age: 58
Discharge: HOME | End: 2018-12-05
Attending: FAMILY MEDICINE
Payer: MEDICAID

## 2018-12-05 ENCOUNTER — HOSPITAL ENCOUNTER (OUTPATIENT)
Dept: ULTRASOUND IMAGING | Facility: HOSPITAL | Age: 58
Discharge: HOME | End: 2018-12-05
Attending: FAMILY MEDICINE
Payer: MEDICAID

## 2018-12-05 DIAGNOSIS — M79.604 LEG PAIN, BILATERAL: ICD-10-CM

## 2018-12-05 DIAGNOSIS — M79.605 LEG PAIN, BILATERAL: ICD-10-CM

## 2018-12-05 DIAGNOSIS — R92.8 ABNORMAL MAMMOGRAM: ICD-10-CM

## 2018-12-05 DIAGNOSIS — Z00.00 WELL ADULT EXAM: ICD-10-CM

## 2018-12-05 PROCEDURE — 36415 COLL VENOUS BLD VENIPUNCTURE: CPT

## 2018-12-05 PROCEDURE — 84460 ALANINE AMINO (ALT) (SGPT): CPT

## 2018-12-05 PROCEDURE — 80061 LIPID PANEL: CPT

## 2018-12-05 PROCEDURE — 77061 BREAST TOMOSYNTHESIS UNI: CPT | Performed by: FAMILY MEDICINE

## 2018-12-05 PROCEDURE — 77065 DX MAMMO INCL CAD UNI: CPT | Performed by: FAMILY MEDICINE

## 2018-12-05 PROCEDURE — 76642 ULTRASOUND BREAST LIMITED: CPT | Performed by: FAMILY MEDICINE

## 2018-12-05 PROCEDURE — 80048 BASIC METABOLIC PNL TOTAL CA: CPT

## 2018-12-05 PROCEDURE — 85025 COMPLETE CBC W/AUTO DIFF WBC: CPT

## 2018-12-05 PROCEDURE — 84450 TRANSFERASE (AST) (SGOT): CPT

## 2018-12-05 PROCEDURE — 84443 ASSAY THYROID STIM HORMONE: CPT

## 2018-12-05 PROCEDURE — 85379 FIBRIN DEGRADATION QUANT: CPT

## 2018-12-09 DIAGNOSIS — E78.00 HYPERCHOLESTEREMIA: Primary | ICD-10-CM

## 2018-12-11 ENCOUNTER — OFFICE VISIT (OUTPATIENT)
Dept: FAMILY MEDICINE CLINIC | Facility: CLINIC | Age: 58
End: 2018-12-11
Payer: MEDICAID

## 2018-12-11 VITALS
TEMPERATURE: 98 F | HEART RATE: 82 BPM | DIASTOLIC BLOOD PRESSURE: 72 MMHG | HEIGHT: 65 IN | BODY MASS INDEX: 27.66 KG/M2 | SYSTOLIC BLOOD PRESSURE: 115 MMHG | WEIGHT: 166 LBS

## 2018-12-11 DIAGNOSIS — M79.605 LEFT LEG PAIN: ICD-10-CM

## 2018-12-11 DIAGNOSIS — Z11.3 SCREENING EXAMINATION FOR STD (SEXUALLY TRANSMITTED DISEASE): ICD-10-CM

## 2018-12-11 DIAGNOSIS — Z01.419 ENCOUNTER FOR GYNECOLOGICAL EXAMINATION: Primary | ICD-10-CM

## 2018-12-11 DIAGNOSIS — E78.00 HYPERCHOLESTEREMIA: ICD-10-CM

## 2018-12-11 PROCEDURE — 99396 PREV VISIT EST AGE 40-64: CPT | Performed by: FAMILY MEDICINE

## 2018-12-11 RX ORDER — ATORVASTATIN CALCIUM 10 MG/1
10 TABLET, FILM COATED ORAL NIGHTLY
Qty: 90 TABLET | Refills: 1 | Status: SHIPPED | OUTPATIENT
Start: 2018-12-11 | End: 2019-05-20 | Stop reason: DRUGHIGH

## 2018-12-11 RX ORDER — IBUPROFEN 800 MG/1
800 TABLET ORAL 2 TIMES DAILY PRN
Qty: 90 TABLET | Refills: 0 | Status: SHIPPED | OUTPATIENT
Start: 2018-12-11 | End: 2019-03-14

## 2018-12-11 NOTE — PROGRESS NOTES
HPI:   Carmine Caraballo is a 62year old female who presents for a complete physical exam. Symptoms: is menopausal.     Patient is . She states that her  has a new partner. She was socially active with him in July 2018.     Wt Readings from [facility] Social History:   Social History    Tobacco Use      Smoking status: Current Every Day Smoker        Packs/day: 0.25        Years: 32.00        Pack years: 8      Smokeless tobacco: Never Used      Tobacco comment: 5 cigarettes/day    Alcohol use: No masses or tenderness, PAP was done       ASSESSMENT AND PLAN:   Elissa Sampson is a 62year old female who presents for a gynecological exam. Pap and pelvic done. Order put in for mammogram. Self breast exam explained.  Pt info handouts given for: exercise,

## 2018-12-19 NOTE — ED NOTES
Discharge instructions given and reviewed with patient, told to follow up with pmd in 1-2 days. Rest, return with any new or worsening symptoms. Verbalized knowledge. Home with family member. Type Of Destruction Used: Curettage Anesthesia Volume In Cc: 0.5 Electrodesiccation And Curettage Text: The wound bed was treated with electrodesiccation and curettage after the biopsy was performed. Bill 12942 For Specimen Handling/Conveyance To Laboratory?: no X Size Of Lesion In Cm: 0 Biopsy Type: H and E Billing Type: Third-Party Bill Silver Nitrate Text: The wound bed was treated with silver nitrate after the biopsy was performed. Hemostasis: Aluminum Chloride Notification Instructions: Patient will be notified of biopsy results. However, patient instructed to call the office if not contacted within 2 weeks. Post-Care Instructions: I reviewed with the patient in detail post-care instructions. Patient is to keep the biopsy site dry overnight, wash with soap and water and then apply ointment daily healed. Body Location Override (Optional - Billing Will Still Be Based On Selected Body Map Location If Applicable): right upper midline back Dressing: bandage Was A Bandage Applied: Yes Consent was obtained and risks were reviewed including but not limited to scarring, infection, bleeding, scabbing, incomplete removal, nerve damage and allergy to anesthesia. Detail Level: Simple Biopsy Method: double edge Personna blade Curettage Text: The wound bed was treated with curettage after the biopsy was performed. Lab: 541 Depth Of Biopsy: dermis Electrodesiccation Text: The wound bed was treated with electrodesiccation after the biopsy was performed. Anesthesia Type: 0.5% lidocaine with 1:200,000 epinephrine and a 1:10 solution of 8.4% sodium bicarbonate Cryotherapy Text: The wound bed was treated with cryotherapy after the biopsy was performed. Lab Facility: [id number] Wound Care: Bacitracin Size Of Lesion In Cm: 1.2

## 2019-03-12 ENCOUNTER — TELEPHONE (OUTPATIENT)
Dept: OTHER | Age: 59
End: 2019-03-12

## 2019-03-12 NOTE — TELEPHONE ENCOUNTER
Pt calling to F/U on depression issues ongoing for months. Pt was evicted from house cries a lot, spouse left her for younger woman. Does not feel like she will harm herself or others.   Attempting to take natural products but not helping, loss of appetit

## 2019-03-14 ENCOUNTER — OFFICE VISIT (OUTPATIENT)
Dept: FAMILY MEDICINE CLINIC | Facility: CLINIC | Age: 59
End: 2019-03-14
Payer: MEDICAID

## 2019-03-14 VITALS
WEIGHT: 172 LBS | BODY MASS INDEX: 28.66 KG/M2 | RESPIRATION RATE: 16 BRPM | HEIGHT: 65 IN | HEART RATE: 84 BPM | TEMPERATURE: 98 F | DIASTOLIC BLOOD PRESSURE: 72 MMHG | SYSTOLIC BLOOD PRESSURE: 124 MMHG

## 2019-03-14 DIAGNOSIS — F41.9 ANXIETY: ICD-10-CM

## 2019-03-14 DIAGNOSIS — G44.209 TENSION HEADACHE: ICD-10-CM

## 2019-03-14 DIAGNOSIS — IMO0002 SELF-INFLICTED INJURY: ICD-10-CM

## 2019-03-14 DIAGNOSIS — F32.A DEPRESSIVE DISORDER: Primary | ICD-10-CM

## 2019-03-14 DIAGNOSIS — G47.10 HYPERSOMNIA: ICD-10-CM

## 2019-03-14 PROCEDURE — 99215 OFFICE O/P EST HI 40 MIN: CPT | Performed by: FAMILY MEDICINE

## 2019-03-14 PROCEDURE — 99212 OFFICE O/P EST SF 10 MIN: CPT | Performed by: FAMILY MEDICINE

## 2019-03-14 RX ORDER — ESCITALOPRAM OXALATE 10 MG/1
10 TABLET ORAL DAILY
Qty: 30 TABLET | Refills: 1 | Status: SHIPPED | OUTPATIENT
Start: 2019-03-14 | End: 2019-04-17

## 2019-03-14 RX ORDER — AMITRIPTYLINE HYDROCHLORIDE 10 MG/1
TABLET, FILM COATED ORAL
Qty: 30 TABLET | Refills: 1 | Status: SHIPPED | OUTPATIENT
Start: 2019-03-14 | End: 2019-04-17

## 2019-03-14 NOTE — PROGRESS NOTES
Patient ID: Carmenza Kingston is a 62year old female. HPI  Patient presents with:  Depression: Patient present for Depression    She feels all of this has to do with getting hit with a small football in her head on 3/7/2018.   She went to the emergency ro Constitutional: Negative for chills and fever. HENT: Negative for voice change. Respiratory: Negative for chest tightness and shortness of breath. Cardiovascular: Negative for chest pain. Gastrointestinal: Negative for abdominal pain.    Skin: N well-nourished. No distress but tearful and depressed. Bobby FISH:   Head: Normocephalic and atraumatic. Neck: Normal range of motion. No thyromegaly present. Cervical paraspinal tenderness bilaterally up into the occiput and the scalp.   She flinches when I nighttime. I do not think this is migraines. This seems to be more tension headache related. Try Elavil at bedtime. Patient Instructions   Get back on the atorvastatin. This decreases your chances of heart attacks and strokes.   Once you are on the d

## 2019-03-14 NOTE — PATIENT INSTRUCTIONS
Get back on the atorvastatin. This decreases your chances of heart attacks and strokes. Once you are on the drug for 2 months go ahead and get the labs done. They are already in the system per Dr. Sharlet Boxer. Fast for 10 hours prior to getting the labs done.

## 2019-03-28 ENCOUNTER — TELEPHONE (OUTPATIENT)
Dept: FAMILY MEDICINE CLINIC | Facility: CLINIC | Age: 59
End: 2019-03-28

## 2019-03-28 NOTE — TELEPHONE ENCOUNTER
Pt called in stating that she attempted to schedule with Psychiatrists but the ones she contacted have availability in 2 months. Pt is asking if there is a physician with [facility] that she can see sooner. Please advise.

## 2019-03-29 NOTE — TELEPHONE ENCOUNTER
Patient clearly has anxiety and wanted to see a counselor. She was tearful during the visit. We told her that if she did not want to wait for 2 months with the [facility] physicians that were given then she can call her own insurance and find out if they can facilitate her seeing a psychiatrist.  She got upset hearing this from the medical assistant and stated he would not then go to the psychiatrist but this is her own decision and we again reiterated that her insurance will be more than willing to help her.

## 2019-03-29 NOTE — TELEPHONE ENCOUNTER
She will need to call her own insurance and she can ask them to help facilitate this for her. They do have a  that works for the [employer] as well.

## 2019-03-29 NOTE — TELEPHONE ENCOUNTER
Spoke with patient informed that she should call her insurance to see what other drs are in her network. Patient states she doesn't want to go but is only going because dr Rebecca Barcenas suggested this. She states that if she has to call she will not do that and she will not go to see anyone.  I told the patient that was her choice but this was suggested by her dr.

## 2019-04-17 DIAGNOSIS — G44.209 TENSION HEADACHE: ICD-10-CM

## 2019-04-17 DIAGNOSIS — F41.9 ANXIETY: ICD-10-CM

## 2019-04-17 DIAGNOSIS — IMO0002 SELF-INFLICTED INJURY: ICD-10-CM

## 2019-04-17 DIAGNOSIS — F32.A DEPRESSIVE DISORDER: ICD-10-CM

## 2019-04-17 DIAGNOSIS — G47.10 HYPERSOMNIA: ICD-10-CM

## 2019-04-17 RX ORDER — ESCITALOPRAM OXALATE 10 MG/1
10 TABLET ORAL DAILY
Qty: 90 TABLET | Refills: 1 | Status: SHIPPED | OUTPATIENT
Start: 2019-04-17 | End: 2019-05-24

## 2019-04-17 RX ORDER — AMITRIPTYLINE HYDROCHLORIDE 10 MG/1
TABLET, FILM COATED ORAL
Qty: 90 TABLET | Refills: 1 | Status: SHIPPED | OUTPATIENT
Start: 2019-04-17 | End: 2019-05-24

## 2019-04-17 NOTE — TELEPHONE ENCOUNTER
Refill passed per [facility] protocol.     Refill Protocol Appointment Criteria  · Appointment scheduled in the past 6 months or in the next 3 months  Recent Outpatient Visits            1 month ago Depressive disorder    [facility], [address],

## 2019-05-04 RX ORDER — ATORVASTATIN CALCIUM 10 MG/1
10 TABLET, FILM COATED ORAL NIGHTLY
Qty: 90 TABLET | Refills: 2 | OUTPATIENT
Start: 2019-05-04

## 2019-05-18 ENCOUNTER — APPOINTMENT (OUTPATIENT)
Dept: LAB | Age: 59
End: 2019-05-18
Attending: FAMILY MEDICINE
Payer: MEDICAID

## 2019-05-18 DIAGNOSIS — E78.00 HYPERCHOLESTEREMIA: ICD-10-CM

## 2019-05-18 PROCEDURE — 80061 LIPID PANEL: CPT

## 2019-05-18 PROCEDURE — 80053 COMPREHEN METABOLIC PANEL: CPT

## 2019-05-18 PROCEDURE — 36415 COLL VENOUS BLD VENIPUNCTURE: CPT

## 2019-05-20 DIAGNOSIS — R73.9 HYPERGLYCEMIA: ICD-10-CM

## 2019-05-20 DIAGNOSIS — E78.00 HYPERCHOLESTEREMIA: Primary | ICD-10-CM

## 2019-05-20 RX ORDER — ATORVASTATIN CALCIUM 20 MG/1
20 TABLET, FILM COATED ORAL NIGHTLY
Qty: 90 TABLET | Refills: 0 | Status: SHIPPED | OUTPATIENT
Start: 2019-05-20 | End: 2019-08-01

## 2019-05-24 PROBLEM — Z91.89 LACK OF MOTIVATION: Status: ACTIVE | Noted: 2019-05-24

## 2019-05-24 PROBLEM — R45.3 APATHY: Status: ACTIVE | Noted: 2019-05-24

## 2019-05-24 NOTE — PROGRESS NOTES
Patient ID: Rand Kong is a 61year old female. HPI  Patient presents with:  Depression: f/u  3/14/19 Office visit  She feels all of this has to do with getting hit with a small football in her head on 3/7/2018.   She went to the emergency room and states she defers this for now and there is no thoughts of hurting herself and she is no longer slapping herself. Pt reports not eating excessive carbohydrates. Pt does not report eating much. Hx of hyperlipidemia.      She has herpes outbreaks every 3 w 1967    and adenoidectomy          Current Outpatient Medications:  atorvastatin 20 MG Oral Tab Take 1 tablet (20 mg total) by mouth nightly. Disp: 90 tablet Rfl: 0   escitalopram 10 MG Oral Tab Take 1 tablet (10 mg total) by mouth daily.  For mood Disp: 90 MG Oral Tab; Take 2 tablets (20 mg total) by mouth nightly. 1 by mouth at nighttime. Depressive disorder  -     escitalopram 20 MG Oral Tab; Take 1 tablet (20 mg total) by mouth daily. For mood    Apathy  -     escitalopram 20 MG Oral Tab;  Take 1 tablet

## 2019-06-26 ENCOUNTER — TELEPHONE (OUTPATIENT)
Dept: FAMILY MEDICINE CLINIC | Facility: CLINIC | Age: 59
End: 2019-06-26

## 2019-06-26 DIAGNOSIS — Z12.31 ENCOUNTER FOR SCREENING MAMMOGRAM FOR BREAST CANCER: Primary | ICD-10-CM

## 2019-06-26 DIAGNOSIS — R92.8 ABNORMAL MAMMOGRAM OF LEFT BREAST: ICD-10-CM

## 2019-06-27 NOTE — TELEPHONE ENCOUNTER
Have been calling pt but does not answered wanted to inform him of message below   Test ordered.   Please inform patient diagnostic bilateral mammogram was due in April 2019

## 2019-07-07 DIAGNOSIS — G44.209 TENSION HEADACHE: ICD-10-CM

## 2019-07-08 RX ORDER — OMEPRAZOLE 40 MG/1
CAPSULE, DELAYED RELEASE ORAL
Qty: 30 CAPSULE | Refills: 0 | OUTPATIENT
Start: 2019-07-08

## 2019-07-08 RX ORDER — AMITRIPTYLINE HYDROCHLORIDE 10 MG/1
20 TABLET, FILM COATED ORAL NIGHTLY
Qty: 180 TABLET | Refills: 0 | Status: SHIPPED | OUTPATIENT
Start: 2019-07-08 | End: 2020-01-09

## 2019-07-08 NOTE — TELEPHONE ENCOUNTER
Refill passed per [facility] protocol.   Refill Protocol Appointment Criteria  · Appointment scheduled in the past 6 months or in the next 3 months  Recent Outpatient Visits            1 month ago [address], [address]

## 2019-07-20 ENCOUNTER — APPOINTMENT (OUTPATIENT)
Dept: LAB | Age: 59
End: 2019-07-20
Attending: FAMILY MEDICINE
Payer: MEDICAID

## 2019-07-20 DIAGNOSIS — E78.00 HYPERCHOLESTEREMIA: ICD-10-CM

## 2019-07-20 DIAGNOSIS — R73.9 HYPERGLYCEMIA: ICD-10-CM

## 2019-07-20 LAB
ALBUMIN SERPL-MCNC: 4.3 G/DL (ref 3.4–5)
ALBUMIN/GLOB SERPL: 1.3 {RATIO} (ref 1–2)
ALP LIVER SERPL-CCNC: 112 U/L (ref 46–118)
ALT SERPL-CCNC: 24 U/L (ref 13–56)
ANION GAP SERPL CALC-SCNC: 6 MMOL/L (ref 0–18)
AST SERPL-CCNC: 16 U/L (ref 15–37)
BILIRUB SERPL-MCNC: 0.6 MG/DL (ref 0.1–2)
BUN BLD-MCNC: 17 MG/DL (ref 7–18)
BUN/CREAT SERPL: 18.3 (ref 10–20)
CALCIUM BLD-MCNC: 10.4 MG/DL (ref 8.5–10.1)
CHLORIDE SERPL-SCNC: 109 MMOL/L (ref 98–112)
CHOLEST SMN-MCNC: 199 MG/DL (ref ?–200)
CO2 SERPL-SCNC: 29 MMOL/L (ref 21–32)
CREAT BLD-MCNC: 0.93 MG/DL (ref 0.55–1.02)
EST. AVERAGE GLUCOSE BLD GHB EST-MCNC: 126 MG/DL (ref 68–126)
GLOBULIN PLAS-MCNC: 3.2 G/DL (ref 2.8–4.4)
GLUCOSE BLD-MCNC: 117 MG/DL (ref 70–99)
HBA1C MFR BLD HPLC: 6 % (ref ?–5.7)
HDLC SERPL-MCNC: 58 MG/DL (ref 40–59)
LDLC SERPL CALC-MCNC: 104 MG/DL (ref ?–100)
M PROTEIN MFR SERPL ELPH: 7.5 G/DL (ref 6.4–8.2)
NONHDLC SERPL-MCNC: 141 MG/DL (ref ?–130)
OSMOLALITY SERPL CALC.SUM OF ELEC: 301 MOSM/KG (ref 275–295)
PATIENT FASTING: YES
PATIENT FASTING: YES
POTASSIUM SERPL-SCNC: 5.6 MMOL/L (ref 3.5–5.1)
SODIUM SERPL-SCNC: 144 MMOL/L (ref 136–145)
TRIGL SERPL-MCNC: 183 MG/DL (ref 30–149)
VLDLC SERPL CALC-MCNC: 37 MG/DL (ref 0–30)

## 2019-07-20 PROCEDURE — 80053 COMPREHEN METABOLIC PANEL: CPT

## 2019-07-20 PROCEDURE — 83036 HEMOGLOBIN GLYCOSYLATED A1C: CPT

## 2019-07-20 PROCEDURE — 80061 LIPID PANEL: CPT

## 2019-07-20 PROCEDURE — 36415 COLL VENOUS BLD VENIPUNCTURE: CPT

## 2019-07-26 DIAGNOSIS — E83.52 HYPERCALCEMIA: ICD-10-CM

## 2019-07-26 DIAGNOSIS — E87.5 HYPERKALEMIA: Primary | ICD-10-CM

## 2019-07-27 ENCOUNTER — HOSPITAL (OUTPATIENT)
Dept: OTHER | Age: 59
End: 2019-07-27
Attending: PHYSICIAN ASSISTANT

## 2019-07-27 LAB
UA APPEAR: CLEAR
UA BACTERIA: ABNORMAL
UA BILI: NEGATIVE
UA BLOOD: ABNORMAL
UA COLOR: YELLOW
UA EPITHELIAL: ABNORMAL
UA GLUCOSE: NEGATIVE
UA KETONES: NEGATIVE
UA LEUK EST: ABNORMAL
UA NITRITE: NEGATIVE
UA PH: 5.5 (ref 5–7)
UA PROTEIN: NEGATIVE
UA RBC: ABNORMAL
UA SPEC GRAV: 1.02 (ref 1.01–1.02)
UA UROBILINOGEN: 0.2 MG/DL (ref 0.2–1)
UA WBC: ABNORMAL

## 2019-08-01 ENCOUNTER — OFFICE VISIT (OUTPATIENT)
Dept: FAMILY MEDICINE CLINIC | Facility: CLINIC | Age: 59
End: 2019-08-01
Payer: MEDICAID

## 2019-08-01 ENCOUNTER — APPOINTMENT (OUTPATIENT)
Dept: LAB | Age: 59
End: 2019-08-01
Attending: FAMILY MEDICINE
Payer: MEDICAID

## 2019-08-01 VITALS
TEMPERATURE: 98 F | SYSTOLIC BLOOD PRESSURE: 109 MMHG | HEART RATE: 88 BPM | BODY MASS INDEX: 28.29 KG/M2 | WEIGHT: 169.81 LBS | DIASTOLIC BLOOD PRESSURE: 73 MMHG | HEIGHT: 65 IN

## 2019-08-01 DIAGNOSIS — F32.A DEPRESSIVE DISORDER: Primary | ICD-10-CM

## 2019-08-01 DIAGNOSIS — G47.10 HYPERSOMNIA: ICD-10-CM

## 2019-08-01 DIAGNOSIS — E87.5 HYPERKALEMIA: ICD-10-CM

## 2019-08-01 DIAGNOSIS — G44.209 TENSION HEADACHE: ICD-10-CM

## 2019-08-01 DIAGNOSIS — K29.70 GASTRITIS WITHOUT BLEEDING, UNSPECIFIED CHRONICITY, UNSPECIFIED GASTRITIS TYPE: ICD-10-CM

## 2019-08-01 DIAGNOSIS — M54.50 ACUTE RIGHT-SIDED LOW BACK PAIN WITHOUT SCIATICA: ICD-10-CM

## 2019-08-01 DIAGNOSIS — E83.52 HYPERCALCEMIA: ICD-10-CM

## 2019-08-01 DIAGNOSIS — F41.9 ANXIETY: ICD-10-CM

## 2019-08-01 DIAGNOSIS — E78.00 HYPERCHOLESTEREMIA: ICD-10-CM

## 2019-08-01 DIAGNOSIS — R45.3 APATHY: ICD-10-CM

## 2019-08-01 LAB
ALBUMIN SERPL-MCNC: 4.2 G/DL (ref 3.4–5)
ALBUMIN/GLOB SERPL: 1.3 {RATIO} (ref 1–2)
ALP LIVER SERPL-CCNC: 113 U/L (ref 46–118)
ALT SERPL-CCNC: 26 U/L (ref 13–56)
ANION GAP SERPL CALC-SCNC: 5 MMOL/L (ref 0–18)
AST SERPL-CCNC: 16 U/L (ref 15–37)
BILIRUB SERPL-MCNC: 0.3 MG/DL (ref 0.1–2)
BUN BLD-MCNC: 20 MG/DL (ref 7–18)
BUN/CREAT SERPL: 19.8 (ref 10–20)
CALCIUM BLD-MCNC: 10 MG/DL (ref 8.5–10.1)
CHLORIDE SERPL-SCNC: 105 MMOL/L (ref 98–112)
CO2 SERPL-SCNC: 32 MMOL/L (ref 21–32)
CREAT BLD-MCNC: 1.01 MG/DL (ref 0.55–1.02)
GLOBULIN PLAS-MCNC: 3.3 G/DL (ref 2.8–4.4)
GLUCOSE BLD-MCNC: 105 MG/DL (ref 70–99)
M PROTEIN MFR SERPL ELPH: 7.5 G/DL (ref 6.4–8.2)
OSMOLALITY SERPL CALC.SUM OF ELEC: 297 MOSM/KG (ref 275–295)
PATIENT FASTING: NO
POTASSIUM SERPL-SCNC: 4.3 MMOL/L (ref 3.5–5.1)
SODIUM SERPL-SCNC: 142 MMOL/L (ref 136–145)

## 2019-08-01 PROCEDURE — 80053 COMPREHEN METABOLIC PANEL: CPT

## 2019-08-01 PROCEDURE — 36415 COLL VENOUS BLD VENIPUNCTURE: CPT

## 2019-08-01 PROCEDURE — 99214 OFFICE O/P EST MOD 30 MIN: CPT | Performed by: FAMILY MEDICINE

## 2019-08-01 RX ORDER — ATORVASTATIN CALCIUM 20 MG/1
20 TABLET, FILM COATED ORAL NIGHTLY
Qty: 90 TABLET | Refills: 1 | Status: SHIPPED | OUTPATIENT
Start: 2019-08-01 | End: 2020-03-07

## 2019-08-01 RX ORDER — MELOXICAM 15 MG/1
15 TABLET ORAL DAILY
Qty: 30 TABLET | Refills: 0 | Status: SHIPPED | OUTPATIENT
Start: 2019-08-01 | End: 2019-09-05

## 2019-08-01 RX ORDER — OMEPRAZOLE 40 MG/1
40 CAPSULE, DELAYED RELEASE ORAL DAILY
Qty: 90 CAPSULE | Refills: 1 | Status: SHIPPED | OUTPATIENT
Start: 2019-08-01 | End: 2020-01-09

## 2019-08-01 NOTE — PROGRESS NOTES
Patient ID: Irasema Davis is a 61year old female.     HPI  Patient presents with:  Depression: follow up  ER F/U: 07/18 for back pain      The patient went to the ER on July 27, 2019, at [facility]. She felt pain in her lower back two days 118/79  18 : 110/66        Review of Systems      Past Medical History:   Diagnosis Date   • Hyperlipidemia    •  (normal spontaneous vaginal delivery)     2       Past Surgical History:   Procedure Laterality Date   • APPENDECTOMY     • BACK side.  Deep Tendon Reflexes were 2 out of 4 bilateral at the knee and ankle  Sensory Exam was intact  Good Strength with plantar flexion and dorsiflexion  Gait was normal.    Able to Flex Forward at the Waist and touch the mid shins  No bony tenderness.   [facility] referral line at [phone] and they will get you a psychiatrist or psychologist.                                          Patient wants to try disability as she is unable to work due to her depression and anxiety.   She is not seeing a psychi

## 2019-08-25 DIAGNOSIS — G47.10 HYPERSOMNIA: ICD-10-CM

## 2019-08-25 DIAGNOSIS — G44.209 TENSION HEADACHE: ICD-10-CM

## 2019-08-25 DIAGNOSIS — Z91.89 LACK OF MOTIVATION: ICD-10-CM

## 2019-08-25 DIAGNOSIS — R45.3 APATHY: ICD-10-CM

## 2019-08-25 DIAGNOSIS — F41.9 ANXIETY: ICD-10-CM

## 2019-08-25 DIAGNOSIS — F32.A DEPRESSIVE DISORDER: ICD-10-CM

## 2019-08-27 RX ORDER — ESCITALOPRAM OXALATE 20 MG/1
TABLET ORAL
Qty: 90 TABLET | Refills: 1 | Status: SHIPPED | OUTPATIENT
Start: 2019-08-27 | End: 2020-01-09

## 2019-08-27 NOTE — TELEPHONE ENCOUNTER
Refill passed protocol but high interaction with meloxicam requiring MD review.     Requested Prescriptions     Pending Prescriptions Disp Refills   • ESCITALOPRAM 20 MG Oral Tab [Pharmacy Med Name: ESCITALOPRAM 20MG TABLETS] 90 tablet 1     Sig: TAKE 1 TAB

## 2019-08-27 NOTE — TELEPHONE ENCOUNTER
Refill passed per [facility], [facility] protocol.   Refill Protocol Appointment Criteria  · Appointment scheduled in the past 6 months or in the next 3 months  Recent Outpatient Visits            3 weeks ago Depressive disorder    [facility], [facility], [address], [place]

## 2019-09-05 DIAGNOSIS — M54.50 ACUTE RIGHT-SIDED LOW BACK PAIN WITHOUT SCIATICA: ICD-10-CM

## 2019-09-05 DIAGNOSIS — G44.209 TENSION HEADACHE: ICD-10-CM

## 2019-09-05 RX ORDER — MELOXICAM 15 MG/1
TABLET ORAL
Qty: 30 TABLET | Refills: 0 | Status: SHIPPED | OUTPATIENT
Start: 2019-09-05 | End: 2020-01-09

## 2019-09-05 NOTE — TELEPHONE ENCOUNTER
Refill passed per [facility], [facility] protocol, but high level interaction with escitalopram.  Please advise.

## 2019-09-30 DIAGNOSIS — G47.10 HYPERSOMNIA: ICD-10-CM

## 2019-09-30 DIAGNOSIS — F41.9 ANXIETY: ICD-10-CM

## 2019-09-30 DIAGNOSIS — IMO0002 SELF-INFLICTED INJURY: ICD-10-CM

## 2019-09-30 DIAGNOSIS — G44.209 TENSION HEADACHE: ICD-10-CM

## 2019-09-30 DIAGNOSIS — F32.A DEPRESSIVE DISORDER: ICD-10-CM

## 2019-10-02 NOTE — TELEPHONE ENCOUNTER
Please verify which dose patient currently taking? Request iffers from current script below.     Medication Quantity Refills Start End   ESCITALOPRAM 20 MG Oral Tab 90 tablet 1 8/27/2019    Sig:   TAKE 1 TABLET(20 MG) BY MOUTH DAILY FOR MOOD     Route:   (n

## 2019-10-02 NOTE — TELEPHONE ENCOUNTER
Per review of the chart pt was on lexapro 10 mg until this note which stated 20 mg daily.     8/1/2019  [facility], [address], [place],    Family Medicine   Depressive disorder +7 more   Dx   Depression   , ER F/U     Reason for

## 2019-10-02 NOTE — TELEPHONE ENCOUNTER
Pt states she is seeing a psychiatrist on 11/1/19, she will make an appointment after that to see Dr. Parmjit Ca. Pt is unsure what dose she is taking, her daughter administers her medication, she have her daughter call us.

## 2019-10-04 DIAGNOSIS — R45.3 APATHY: ICD-10-CM

## 2019-10-04 DIAGNOSIS — G44.209 TENSION HEADACHE: ICD-10-CM

## 2019-10-04 DIAGNOSIS — G47.10 HYPERSOMNIA: ICD-10-CM

## 2019-10-04 DIAGNOSIS — F32.A DEPRESSIVE DISORDER: ICD-10-CM

## 2019-10-04 DIAGNOSIS — F41.9 ANXIETY: ICD-10-CM

## 2019-10-04 DIAGNOSIS — Z91.89 LACK OF MOTIVATION: ICD-10-CM

## 2019-10-04 RX ORDER — ESCITALOPRAM OXALATE 10 MG/1
TABLET ORAL
Qty: 90 TABLET | Refills: 0 | OUTPATIENT
Start: 2019-10-04

## 2019-10-04 NOTE — TELEPHONE ENCOUNTER
[facility] [phone] is calling for status of the medication request. Per Eusebio Morrissey patient is out of escitalopram.

## 2019-10-04 NOTE — TELEPHONE ENCOUNTER
Spoke with Brad who stated \"it must have been the patient requesting the 10 mg because we have on file 20 mg with the last refill in May, 2019.     Informed h that from patient's chart, Escitalopram 20 mg was sent to [facility]) on 8/27

## 2019-10-05 RX ORDER — ESCITALOPRAM OXALATE 20 MG/1
TABLET ORAL
Qty: 90 TABLET | Refills: 0 | OUTPATIENT
Start: 2019-10-05

## 2019-10-24 ENCOUNTER — PATIENT MESSAGE (OUTPATIENT)
Dept: FAMILY MEDICINE CLINIC | Facility: CLINIC | Age: 59
End: 2019-10-24

## 2019-10-24 DIAGNOSIS — G44.209 TENSION HEADACHE: ICD-10-CM

## 2019-10-24 DIAGNOSIS — IMO0002 SELF-INFLICTED INJURY: ICD-10-CM

## 2019-10-24 DIAGNOSIS — G47.10 HYPERSOMNIA: ICD-10-CM

## 2019-10-24 DIAGNOSIS — F41.9 ANXIETY: ICD-10-CM

## 2019-10-24 DIAGNOSIS — F32.A DEPRESSIVE DISORDER: ICD-10-CM

## 2019-10-24 RX ORDER — ESCITALOPRAM OXALATE 10 MG/1
TABLET ORAL
Qty: 90 TABLET | Refills: 0 | OUTPATIENT
Start: 2019-10-24

## 2019-10-24 NOTE — TELEPHONE ENCOUNTER
Per OV 5/24/19 with Dr Michael Gonzaleset was increased to 20mg daily, recent refill for Lexapro 20mg sent to pharmacy on 8/27/19 by Dr Kirby East. Refill denied, patient no longer on 10mg dose. No refills needed at this time.

## 2019-10-24 NOTE — TELEPHONE ENCOUNTER
From: Jennifer Roberts  To:  Johanne Fischer DO  Sent: 10/24/2019 5:11 PM CDT  Subject: Prescription Question    Hi I would like to know Alcom is no more refills for the medicine is etalopram I need the medicine and makes me feel better The pharmacy [facility]

## 2019-10-24 NOTE — TELEPHONE ENCOUNTER
From: Le Callaway  To:  Valentino Bali, DO  Sent: 10/24/2019 5:49 PM CDT  Subject: Prescription Question    Hi Eloy Britton never gave me 90 only prescribe 30 i have medicaid They cannot give me 90 If I have regular insurance and then yes But with Medicaid take a

## 2019-10-25 ENCOUNTER — TELEPHONE (OUTPATIENT)
Dept: FAMILY MEDICINE CLINIC | Facility: CLINIC | Age: 59
End: 2019-10-25

## 2019-10-25 NOTE — TELEPHONE ENCOUNTER
Called Brad and spoke with Fernando Diaz, states that patient used to take escitalopram 10 mg and was changed to 20 mg daily, states that the 20 mg was picked up by the patient on 9/5/19 for 3 month supply per their record.     LOV  8/1/19, with chronic dep

## 2019-10-25 NOTE — TELEPHONE ENCOUNTER
From: Asael Pitt  To:  Rodrigue Baker DO  Sent: 10/24/2019 6:06 PM CDT  Subject: Prescription Question    I did call josette I have the bottle I have the Medicine just 30 So now you're gonna look and find out for me thank you I've been out taking it a

## 2020-01-09 ENCOUNTER — HOSPITAL ENCOUNTER (OUTPATIENT)
Dept: GENERAL RADIOLOGY | Age: 60
Discharge: HOME | End: 2020-01-09
Attending: FAMILY MEDICINE
Payer: MEDICAID

## 2020-01-09 ENCOUNTER — OFFICE VISIT (OUTPATIENT)
Dept: FAMILY MEDICINE CLINIC | Facility: CLINIC | Age: 60
End: 2020-01-09
Payer: MEDICAID

## 2020-01-09 VITALS
TEMPERATURE: 98 F | HEART RATE: 81 BPM | WEIGHT: 168.38 LBS | DIASTOLIC BLOOD PRESSURE: 66 MMHG | SYSTOLIC BLOOD PRESSURE: 110 MMHG | BODY MASS INDEX: 28.06 KG/M2 | HEIGHT: 65 IN

## 2020-01-09 DIAGNOSIS — K21.9 GASTROESOPHAGEAL REFLUX DISEASE, ESOPHAGITIS PRESENCE NOT SPECIFIED: ICD-10-CM

## 2020-01-09 DIAGNOSIS — H04.203 WATERY EYES: ICD-10-CM

## 2020-01-09 DIAGNOSIS — A60.00 GENITAL HERPES SIMPLEX, UNSPECIFIED SITE: ICD-10-CM

## 2020-01-09 DIAGNOSIS — F41.9 ANXIETY: ICD-10-CM

## 2020-01-09 DIAGNOSIS — J30.89 OTHER ALLERGIC RHINITIS: ICD-10-CM

## 2020-01-09 DIAGNOSIS — G47.10 HYPERSOMNIA: ICD-10-CM

## 2020-01-09 DIAGNOSIS — Z91.89 LACK OF MOTIVATION: ICD-10-CM

## 2020-01-09 DIAGNOSIS — E78.00 HYPERCHOLESTEREMIA: ICD-10-CM

## 2020-01-09 DIAGNOSIS — R45.3 APATHY: ICD-10-CM

## 2020-01-09 DIAGNOSIS — G44.209 TENSION HEADACHE: ICD-10-CM

## 2020-01-09 DIAGNOSIS — F32.A DEPRESSIVE DISORDER: ICD-10-CM

## 2020-01-09 DIAGNOSIS — Z00.00 ADULT GENERAL MEDICAL EXAM: Primary | ICD-10-CM

## 2020-01-09 DIAGNOSIS — Z23 NEED FOR VACCINATION: ICD-10-CM

## 2020-01-09 DIAGNOSIS — R73.9 HYPERGLYCEMIA: ICD-10-CM

## 2020-01-09 DIAGNOSIS — Z12.31 VISIT FOR SCREENING MAMMOGRAM: ICD-10-CM

## 2020-01-09 PROCEDURE — 99396 PREV VISIT EST AGE 40-64: CPT | Performed by: FAMILY MEDICINE

## 2020-01-09 PROCEDURE — 90686 IIV4 VACC NO PRSV 0.5 ML IM: CPT | Performed by: FAMILY MEDICINE

## 2020-01-09 PROCEDURE — 99213 OFFICE O/P EST LOW 20 MIN: CPT | Performed by: FAMILY MEDICINE

## 2020-01-09 PROCEDURE — 71046 X-RAY EXAM CHEST 2 VIEWS: CPT | Performed by: FAMILY MEDICINE

## 2020-01-09 PROCEDURE — 90471 IMMUNIZATION ADMIN: CPT | Performed by: FAMILY MEDICINE

## 2020-01-09 RX ORDER — OMEPRAZOLE 40 MG/1
40 CAPSULE, DELAYED RELEASE ORAL DAILY
Qty: 90 CAPSULE | Refills: 1 | Status: SHIPPED | OUTPATIENT
Start: 2020-01-09 | End: 2020-05-18

## 2020-01-09 RX ORDER — AMITRIPTYLINE HYDROCHLORIDE 10 MG/1
10 TABLET, FILM COATED ORAL NIGHTLY
Qty: 90 TABLET | Refills: 1 | Status: SHIPPED | OUTPATIENT
Start: 2020-01-09 | End: 2020-05-18

## 2020-01-09 RX ORDER — ESCITALOPRAM OXALATE 20 MG/1
TABLET ORAL
Qty: 90 TABLET | Refills: 1 | Status: SHIPPED | OUTPATIENT
Start: 2020-01-09 | End: 2020-05-18

## 2020-01-09 RX ORDER — VALACYCLOVIR HYDROCHLORIDE 500 MG/1
500 TABLET, FILM COATED ORAL DAILY
Qty: 90 TABLET | Refills: 2 | Status: SHIPPED | OUTPATIENT
Start: 2020-01-09 | End: 2020-10-15

## 2020-01-09 RX ORDER — AZELASTINE HYDROCHLORIDE 0.5 MG/ML
1 SOLUTION/ DROPS OPHTHALMIC 2 TIMES DAILY
Qty: 1 BOTTLE | Refills: 3 | Status: SHIPPED | OUTPATIENT
Start: 2020-01-09 | End: 2020-05-08

## 2020-01-09 NOTE — PROGRESS NOTES
Patient ID: Cordelia Raines is a 61year old female. HPI  Patient presents with:  Physical    Pt is . Her  has returned from [facility]. She would like to know if I can see her  as well.     She wants a physical exam but has other complai of 1 - PPSV23) due on 05/05/1979  FIT Colorectal Screening due on 05/05/2010  Colonoscopy due on 05/05/2010  Influenza Vaccine(1) due on 09/01/2019  Annual Depression Screen due on 11/27/2019  Mammogram due on 12/05/2019  Annual Physical due on 12/11/2019 07/20/2019     (H) 07/20/2019    VLDL 37 (H) 07/20/2019    NONHDLC 141 (H) 07/20/2019     TSH (uIU/mL)   Date Value   12/05/2018 4.50     =======================================================    Wt Readings from Last 6 Encounters:  01/09/20 : 168 Spouse name: Not on file      Number of children: Not on file      Years of education: Not on file      Highest education level: Not on file    Occupational History      Not on file    Social Needs      Financial resource strain: Not on file      Food in file         Current Outpatient Medications   Medication Sig Dispense Refill   • MELOXICAM 15 MG Oral Tab TAKE 1 TABLET(15 MG) BY MOUTH DAILY WITH MEALS FOR PAIN OR INFLAMMATION 30 tablet 0   • ESCITALOPRAM 20 MG Oral Tab TAKE 1 TABLET(20 MG) BY MOUTH DAJA temperature 98 °F (36.7 °C), temperature source Oral, height 5' 5\" (1.651 m), weight 168 lb 6.4 oz (76.4 kg), not currently breastfeeding.          ASSESSMENT/PLAN:     Diagnoses and all orders for this visit:    Adult general medical exam  -     CBC WITH FLULAVAL INFLUENZA VACCINE QUAD PRESERVATIVE FREE 0.5 ML    Visit for screening mammogram  -     [facility] SCREENING BILAT (CPT=77067);  Future    Other orders  -     HEMOGLOBIN A1C; Future          Curvin Roy  1/9/2020      By signing my name below, IEdson

## 2020-01-10 PROBLEM — R91.1 PULMONARY NODULE, LEFT: Status: ACTIVE | Noted: 2020-01-10

## 2020-01-11 ENCOUNTER — LAB ENCOUNTER (OUTPATIENT)
Dept: LAB | Age: 60
End: 2020-01-11
Attending: FAMILY MEDICINE
Payer: MEDICAID

## 2020-01-11 ENCOUNTER — APPOINTMENT (OUTPATIENT)
Dept: LAB | Age: 60
End: 2020-01-11
Attending: FAMILY MEDICINE
Payer: MEDICAID

## 2020-01-11 DIAGNOSIS — E78.00 HYPERCHOLESTEREMIA: ICD-10-CM

## 2020-01-11 DIAGNOSIS — R73.9 HYPERGLYCEMIA: ICD-10-CM

## 2020-01-11 DIAGNOSIS — Z00.00 ADULT GENERAL MEDICAL EXAM: ICD-10-CM

## 2020-01-11 LAB
ALBUMIN SERPL-MCNC: 3.8 G/DL (ref 3.4–5)
ALBUMIN/GLOB SERPL: 1.2 {RATIO} (ref 1–2)
ALP LIVER SERPL-CCNC: 111 U/L (ref 46–118)
ALT SERPL-CCNC: 20 U/L (ref 13–56)
ANION GAP SERPL CALC-SCNC: 4 MMOL/L (ref 0–18)
AST SERPL-CCNC: 13 U/L (ref 15–37)
BASOPHILS # BLD AUTO: 0.04 X10(3) UL (ref 0–0.2)
BASOPHILS NFR BLD AUTO: 0.7 %
BILIRUB SERPL-MCNC: 0.4 MG/DL (ref 0.1–2)
BUN BLD-MCNC: 15 MG/DL (ref 7–18)
BUN/CREAT SERPL: 18.1 (ref 10–20)
CALCIUM BLD-MCNC: 9.8 MG/DL (ref 8.5–10.1)
CHLORIDE SERPL-SCNC: 110 MMOL/L (ref 98–112)
CHOLEST SMN-MCNC: 184 MG/DL (ref ?–200)
CO2 SERPL-SCNC: 28 MMOL/L (ref 21–32)
CREAT BLD-MCNC: 0.83 MG/DL (ref 0.55–1.02)
DEPRECATED RDW RBC AUTO: 41 FL (ref 35.1–46.3)
EOSINOPHIL # BLD AUTO: 0.22 X10(3) UL (ref 0–0.7)
EOSINOPHIL NFR BLD AUTO: 3.7 %
ERYTHROCYTE [DISTWIDTH] IN BLOOD BY AUTOMATED COUNT: 12.6 % (ref 11–15)
EST. AVERAGE GLUCOSE BLD GHB EST-MCNC: 123 MG/DL (ref 68–126)
GLOBULIN PLAS-MCNC: 3.1 G/DL (ref 2.8–4.4)
GLUCOSE BLD-MCNC: 99 MG/DL (ref 70–99)
HBA1C MFR BLD HPLC: 5.9 % (ref ?–5.7)
HCT VFR BLD AUTO: 42 % (ref 35–48)
HDLC SERPL-MCNC: 53 MG/DL (ref 40–59)
HGB BLD-MCNC: 13.9 G/DL (ref 12–16)
IMM GRANULOCYTES # BLD AUTO: 0.01 X10(3) UL (ref 0–1)
IMM GRANULOCYTES NFR BLD: 0.2 %
LDLC SERPL CALC-MCNC: 84 MG/DL (ref ?–100)
LYMPHOCYTES # BLD AUTO: 1.72 X10(3) UL (ref 1–4)
LYMPHOCYTES NFR BLD AUTO: 29.3 %
M PROTEIN MFR SERPL ELPH: 6.9 G/DL (ref 6.4–8.2)
MCH RBC QN AUTO: 29.1 PG (ref 26–34)
MCHC RBC AUTO-ENTMCNC: 33.1 G/DL (ref 31–37)
MCV RBC AUTO: 88.1 FL (ref 80–100)
MONOCYTES # BLD AUTO: 0.51 X10(3) UL (ref 0.1–1)
MONOCYTES NFR BLD AUTO: 8.7 %
NEUTROPHILS # BLD AUTO: 3.38 X10 (3) UL (ref 1.5–7.7)
NEUTROPHILS # BLD AUTO: 3.38 X10(3) UL (ref 1.5–7.7)
NEUTROPHILS NFR BLD AUTO: 57.4 %
NONHDLC SERPL-MCNC: 131 MG/DL (ref ?–130)
OSMOLALITY SERPL CALC.SUM OF ELEC: 295 MOSM/KG (ref 275–295)
PATIENT FASTING Y/N/NP: YES
PATIENT FASTING Y/N/NP: YES
PLATELET # BLD AUTO: 219 10(3)UL (ref 150–450)
POTASSIUM SERPL-SCNC: 4.7 MMOL/L (ref 3.5–5.1)
RBC # BLD AUTO: 4.77 X10(6)UL (ref 3.8–5.3)
SODIUM SERPL-SCNC: 142 MMOL/L (ref 136–145)
TRIGL SERPL-MCNC: 235 MG/DL (ref 30–149)
TSI SER-ACNC: 2.64 MIU/ML (ref 0.36–3.74)
VLDLC SERPL CALC-MCNC: 47 MG/DL (ref 0–30)
WBC # BLD AUTO: 5.9 X10(3) UL (ref 4–11)

## 2020-01-11 PROCEDURE — 80053 COMPREHEN METABOLIC PANEL: CPT

## 2020-01-11 PROCEDURE — 84443 ASSAY THYROID STIM HORMONE: CPT

## 2020-01-11 PROCEDURE — 36415 COLL VENOUS BLD VENIPUNCTURE: CPT

## 2020-01-11 PROCEDURE — 93005 ELECTROCARDIOGRAM TRACING: CPT

## 2020-01-11 PROCEDURE — 85025 COMPLETE CBC W/AUTO DIFF WBC: CPT

## 2020-01-11 PROCEDURE — 80061 LIPID PANEL: CPT

## 2020-01-11 PROCEDURE — 83036 HEMOGLOBIN GLYCOSYLATED A1C: CPT

## 2020-01-11 PROCEDURE — 93010 ELECTROCARDIOGRAM REPORT: CPT | Performed by: FAMILY MEDICINE

## 2020-01-16 ENCOUNTER — HOSPITAL ENCOUNTER (OUTPATIENT)
Dept: CT IMAGING | Facility: HOSPITAL | Age: 60
Discharge: HOME | End: 2020-01-16
Attending: FAMILY MEDICINE
Payer: MEDICAID

## 2020-01-16 DIAGNOSIS — Z72.0 TOBACCO USE: ICD-10-CM

## 2020-01-16 DIAGNOSIS — R91.1 PULMONARY NODULE, LEFT: ICD-10-CM

## 2020-01-16 PROCEDURE — 71250 CT THORAX DX C-: CPT | Performed by: FAMILY MEDICINE

## 2020-01-21 ENCOUNTER — HOSPITAL ENCOUNTER (OUTPATIENT)
Dept: CV DIAGNOSTICS | Facility: HOSPITAL | Age: 60
Discharge: HOME | End: 2020-01-21
Attending: FAMILY MEDICINE
Payer: MEDICAID

## 2020-01-21 DIAGNOSIS — E78.2 MIXED HYPERLIPIDEMIA: ICD-10-CM

## 2020-01-21 DIAGNOSIS — Z72.0 TOBACCO USE: ICD-10-CM

## 2020-01-21 DIAGNOSIS — R94.31 ABNORMAL EKG: ICD-10-CM

## 2020-01-21 PROCEDURE — 93306 TTE W/DOPPLER COMPLETE: CPT | Performed by: FAMILY MEDICINE

## 2020-01-29 ENCOUNTER — HOSPITAL ENCOUNTER (OUTPATIENT)
Dept: MAMMOGRAPHY | Age: 60
Discharge: HOME | End: 2020-01-29
Attending: FAMILY MEDICINE
Payer: MEDICAID

## 2020-01-29 DIAGNOSIS — Z12.31 VISIT FOR SCREENING MAMMOGRAM: ICD-10-CM

## 2020-01-29 PROCEDURE — 77063 BREAST TOMOSYNTHESIS BI: CPT | Performed by: FAMILY MEDICINE

## 2020-01-29 PROCEDURE — 77067 SCR MAMMO BI INCL CAD: CPT | Performed by: FAMILY MEDICINE

## 2020-02-15 ENCOUNTER — HOSPITAL ENCOUNTER (EMERGENCY)
Facility: HOSPITAL | Age: 60
Discharge: HOME | End: 2020-02-15
Payer: MEDICAID

## 2020-02-15 ENCOUNTER — APPOINTMENT (OUTPATIENT)
Dept: CT IMAGING | Facility: HOSPITAL | Age: 60
End: 2020-02-15
Attending: NURSE PRACTITIONER
Payer: MEDICAID

## 2020-02-15 ENCOUNTER — HOSPITAL ENCOUNTER (OUTPATIENT)
Age: 60
Discharge: ANOTHER HOSPITAL | End: 2020-02-15
Attending: EMERGENCY MEDICINE
Payer: MEDICAID

## 2020-02-15 VITALS
WEIGHT: 165 LBS | HEART RATE: 56 BPM | OXYGEN SATURATION: 96 % | SYSTOLIC BLOOD PRESSURE: 118 MMHG | TEMPERATURE: 98 F | RESPIRATION RATE: 14 BRPM | BODY MASS INDEX: 27.49 KG/M2 | HEIGHT: 65 IN | DIASTOLIC BLOOD PRESSURE: 60 MMHG

## 2020-02-15 VITALS
HEIGHT: 66 IN | DIASTOLIC BLOOD PRESSURE: 46 MMHG | HEART RATE: 75 BPM | BODY MASS INDEX: 23.3 KG/M2 | WEIGHT: 145 LBS | SYSTOLIC BLOOD PRESSURE: 103 MMHG | OXYGEN SATURATION: 97 % | RESPIRATION RATE: 18 BRPM | TEMPERATURE: 98 F

## 2020-02-15 DIAGNOSIS — N30.00 ACUTE CYSTITIS WITHOUT HEMATURIA: ICD-10-CM

## 2020-02-15 DIAGNOSIS — R10.30 LOWER ABDOMINAL PAIN: Primary | ICD-10-CM

## 2020-02-15 DIAGNOSIS — K52.9 COLITIS: Primary | ICD-10-CM

## 2020-02-15 LAB
ALBUMIN SERPL-MCNC: 3.9 G/DL (ref 3.4–5)
ALBUMIN/GLOB SERPL: 1.2 {RATIO} (ref 1–2)
ALP LIVER SERPL-CCNC: 118 U/L (ref 46–118)
ALT SERPL-CCNC: 21 U/L (ref 13–56)
ANION GAP SERPL CALC-SCNC: 5 MMOL/L (ref 0–18)
AST SERPL-CCNC: 11 U/L (ref 15–37)
BASOPHILS # BLD AUTO: 0.05 X10(3) UL (ref 0–0.2)
BASOPHILS NFR BLD AUTO: 0.5 %
BILIRUB SERPL-MCNC: 0.3 MG/DL (ref 0.1–2)
BILIRUB UR QL: NEGATIVE
BUN BLD-MCNC: 19 MG/DL (ref 7–18)
BUN/CREAT SERPL: 26.4 (ref 10–20)
CALCIUM BLD-MCNC: 9.4 MG/DL (ref 8.5–10.1)
CHLORIDE SERPL-SCNC: 108 MMOL/L (ref 98–112)
CLARITY UR: CLEAR
CO2 SERPL-SCNC: 29 MMOL/L (ref 21–32)
COLOR UR: YELLOW
CREAT BLD-MCNC: 0.72 MG/DL (ref 0.55–1.02)
DEPRECATED RDW RBC AUTO: 40.2 FL (ref 35.1–46.3)
EOSINOPHIL # BLD AUTO: 0.15 X10(3) UL (ref 0–0.7)
EOSINOPHIL NFR BLD AUTO: 1.4 %
ERYTHROCYTE [DISTWIDTH] IN BLOOD BY AUTOMATED COUNT: 12.7 % (ref 11–15)
GLOBULIN PLAS-MCNC: 3.2 G/DL (ref 2.8–4.4)
GLUCOSE BLD-MCNC: 85 MG/DL (ref 70–99)
GLUCOSE UR-MCNC: NEGATIVE MG/DL
HAV IGM SER QL: 2.4 MG/DL (ref 1.6–2.6)
HCT VFR BLD AUTO: 40.7 % (ref 35–48)
HGB BLD-MCNC: 13.7 G/DL (ref 12–16)
HGB UR QL STRIP.AUTO: NEGATIVE
IMM GRANULOCYTES # BLD AUTO: 0.04 X10(3) UL (ref 0–1)
IMM GRANULOCYTES NFR BLD: 0.4 %
KETONES UR-MCNC: NEGATIVE MG/DL
LIPASE SERPL-CCNC: 139 U/L (ref 73–393)
LYMPHOCYTES # BLD AUTO: 3.1 X10(3) UL (ref 1–4)
LYMPHOCYTES NFR BLD AUTO: 28.6 %
M PROTEIN MFR SERPL ELPH: 7.1 G/DL (ref 6.4–8.2)
MCH RBC QN AUTO: 29.2 PG (ref 26–34)
MCHC RBC AUTO-ENTMCNC: 33.7 G/DL (ref 31–37)
MCV RBC AUTO: 86.8 FL (ref 80–100)
MONOCYTES # BLD AUTO: 0.71 X10(3) UL (ref 0.1–1)
MONOCYTES NFR BLD AUTO: 6.5 %
NEUTROPHILS # BLD AUTO: 6.79 X10 (3) UL (ref 1.5–7.7)
NEUTROPHILS # BLD AUTO: 6.79 X10(3) UL (ref 1.5–7.7)
NEUTROPHILS NFR BLD AUTO: 62.6 %
NITRITE UR QL STRIP.AUTO: NEGATIVE
OSMOLALITY SERPL CALC.SUM OF ELEC: 296 MOSM/KG (ref 275–295)
PH UR: 5 [PH] (ref 5–8)
PLATELET # BLD AUTO: 245 10(3)UL (ref 150–450)
POTASSIUM SERPL-SCNC: 4.3 MMOL/L (ref 3.5–5.1)
PROT UR-MCNC: NEGATIVE MG/DL
RBC # BLD AUTO: 4.69 X10(6)UL (ref 3.8–5.3)
RBC #/AREA URNS AUTO: 2 /HPF
SODIUM SERPL-SCNC: 142 MMOL/L (ref 136–145)
SP GR UR STRIP: 1.01 (ref 1–1.03)
UROBILINOGEN UR STRIP-ACNC: <2
WBC # BLD AUTO: 10.8 X10(3) UL (ref 4–11)
WBC #/AREA URNS AUTO: 2 /HPF

## 2020-02-15 PROCEDURE — 74177 CT ABD & PELVIS W/CONTRAST: CPT | Performed by: NURSE PRACTITIONER

## 2020-02-15 PROCEDURE — 83690 ASSAY OF LIPASE: CPT | Performed by: NURSE PRACTITIONER

## 2020-02-15 PROCEDURE — 99284 EMERGENCY DEPT VISIT MOD MDM: CPT

## 2020-02-15 PROCEDURE — 81001 URINALYSIS AUTO W/SCOPE: CPT | Performed by: NURSE PRACTITIONER

## 2020-02-15 PROCEDURE — 83735 ASSAY OF MAGNESIUM: CPT | Performed by: NURSE PRACTITIONER

## 2020-02-15 PROCEDURE — 85025 COMPLETE CBC W/AUTO DIFF WBC: CPT | Performed by: NURSE PRACTITIONER

## 2020-02-15 PROCEDURE — 99212 OFFICE O/P EST SF 10 MIN: CPT

## 2020-02-15 PROCEDURE — 96374 THER/PROPH/DIAG INJ IV PUSH: CPT

## 2020-02-15 PROCEDURE — 96361 HYDRATE IV INFUSION ADD-ON: CPT

## 2020-02-15 PROCEDURE — 99213 OFFICE O/P EST LOW 20 MIN: CPT

## 2020-02-15 PROCEDURE — 80053 COMPREHEN METABOLIC PANEL: CPT | Performed by: NURSE PRACTITIONER

## 2020-02-15 PROCEDURE — 96375 TX/PRO/DX INJ NEW DRUG ADDON: CPT

## 2020-02-15 RX ORDER — CIPROFLOXACIN 500 MG/1
500 TABLET, FILM COATED ORAL 2 TIMES DAILY
Qty: 14 TABLET | Refills: 0 | Status: SHIPPED | OUTPATIENT
Start: 2020-02-15 | End: 2020-02-22

## 2020-02-15 RX ORDER — ONDANSETRON 2 MG/ML
4 INJECTION INTRAMUSCULAR; INTRAVENOUS ONCE
Status: COMPLETED | OUTPATIENT
Start: 2020-02-15 | End: 2020-02-15

## 2020-02-15 RX ORDER — ONDANSETRON 4 MG/1
4 TABLET, ORALLY DISINTEGRATING ORAL EVERY 4 HOURS PRN
Qty: 10 TABLET | Refills: 0 | Status: SHIPPED | OUTPATIENT
Start: 2020-02-15 | End: 2020-02-22

## 2020-02-15 RX ORDER — MORPHINE SULFATE 4 MG/ML
2 INJECTION, SOLUTION INTRAMUSCULAR; INTRAVENOUS ONCE
Status: COMPLETED | OUTPATIENT
Start: 2020-02-15 | End: 2020-02-15

## 2020-02-15 RX ORDER — HYDROCODONE BITARTRATE AND ACETAMINOPHEN 5; 325 MG/1; MG/1
1-2 TABLET ORAL EVERY 6 HOURS PRN
Qty: 10 TABLET | Refills: 0 | Status: SHIPPED | OUTPATIENT
Start: 2020-02-15 | End: 2020-02-22

## 2020-02-15 NOTE — ED INITIAL ASSESSMENT (HPI)
Lower abdominal pain radiating to her back since yesterday. + nausea  Sent from [address] [place] [address] for further eval

## 2020-02-15 NOTE — ED PROVIDER NOTES
Patient Seen in: [facility] In [address] [place] [address]      History   Patient presents with:  Abdomen/Flank Pain    Stated Complaint: lower abdominal/back pain    HPI    Patient is a 49-year-old female who yesterday started with some crampy diffus well-nourished. HEENT:   Head: Normocephalic and atraumatic.    Right Ear: External ear normal.   Left Ear: External ear normal.   Nose: Nose normal.   Mouth/Throat: Oropharynx is clear and moist.   Eyes: Conjunctivae and EOM are normal. Pupils are equal,

## 2020-02-15 NOTE — ED PROVIDER NOTES
Patient Seen in: [facility] Emergency Department      History   Patient presents with:  Abdominal Pain    Stated Complaint: abdominal pain radiating towards the back    58yo/f with hx of HLD reports to the ED with bilateral lower quadrant pain wra above.    Physical Exam     ED Triage Vitals [02/15/20 1054]   /79   Pulse 74   Resp 18   Temp 98.2 °F (36.8 °C)   Temp src Oral   SpO2 98 %   O2 Device None (Room air)       Current:/60   Pulse 56   Temp 97.5 °F (36.4 °C) (Oral)   Resp 14   Ht following components:    Leukocyte Esterase Urine Moderate (*)     Bacteria Urine Few (*)     All other components within normal limits   LIPASE - Normal   MAGNESIUM - Normal   CBC WITH DIFFERENTIAL WITH PLATELET    Narrative:      The following orders were Equivocal proximal transverse colonic wall thickening. Mild gastric distention. The appendix is not clearly delineated, but there are no suspicious inflammatory manifestations in the right lower   quadrant.   URINARY BLADDER:    No visible calculus or foca leukocytosis  Afebrile  No urinary symptoms  No cva tenderness  S/p lap choley and appendectomy  No blood in stool or recent travel  + loose stool, LLQ tenderness on 2 exams  Colitis on CT  Possible ascending UTI on ct    Plan    cipro bid, close fu with D

## 2020-02-15 NOTE — ED INITIAL ASSESSMENT (HPI)
Abdominal pain since yesterday with low back pain.  +chills and fever. +nausea. No vomiting. Last episode of bm was today. Increase urinary frequency.   No pain with urination

## 2020-03-05 DIAGNOSIS — E78.00 HYPERCHOLESTEREMIA: ICD-10-CM

## 2020-03-07 ENCOUNTER — TELEPHONE (OUTPATIENT)
Dept: FAMILY MEDICINE CLINIC | Facility: CLINIC | Age: 60
End: 2020-03-07

## 2020-03-07 RX ORDER — ATORVASTATIN CALCIUM 20 MG/1
TABLET, FILM COATED ORAL
Qty: 90 TABLET | Refills: 1 | Status: SHIPPED | OUTPATIENT
Start: 2020-03-07 | End: 2020-05-18

## 2020-03-07 NOTE — TELEPHONE ENCOUNTER
Omeprazole 40 MG Oral Capsule Delayed Release, Take 1 capsule (40 mg total) by mouth daily. , Disp: 90 capsule, Rfl: 1

## 2020-03-07 NOTE — TELEPHONE ENCOUNTER
Refill passed per [facility] protocol.     Requested Prescriptions   Pending Prescriptions Disp Refills   • ATORVASTATIN 20 MG Oral Tab [Pharmacy Med Name: ATORVASTATIN 20MG TABLETS] 90 tablet 1     Sig: TAKE 1 TABLET(20 MG) BY MOUTH EVERY NIGHT

## 2020-03-16 ENCOUNTER — NURSE TRIAGE (OUTPATIENT)
Dept: OTHER | Age: 60
End: 2020-03-16

## 2020-03-16 NOTE — TELEPHONE ENCOUNTER
Action Requested: Summary for Provider     []  Critical Lab, Recommendations Needed  [] Need Additional Advice  []   FYI    []   Need Orders  [] Need Medications Sent to Pharmacy  []  Other     SUMMARY: Pt c/o cough, headache, pressure, nasal congestion x

## 2020-03-16 NOTE — TELEPHONE ENCOUNTER
----- Message from Shelly Fowler sent at 3/16/2020  8:30 AM CDT -----  Regarding: Non-Urgent Medical Question  Contact: [phone]  Good morning this is Shelly Fowler I fill like a flu all night bad Headaches I was chills my left leg is in pain if you

## 2020-03-17 NOTE — TELEPHONE ENCOUNTER
Pt called to follow up on Omeprazole refill request. Informed pt med had been refilled 1/9/2020 and she should have refill available. States she only gets 30 day supply and pharmacist informed her she is only allowed 120 capsules in a year per her insurance plan. Reviewed information with pharmacist. Pharmacist confirmed plan only covers 120 capsules without a prior authorization.

## 2020-03-17 NOTE — TELEPHONE ENCOUNTER
I would  take some Robitussin cough and cold and maybe some over-the-counter Sudafed to dry up some of the mucus and congestion. Are your symptoms better?

## 2020-03-17 NOTE — TELEPHONE ENCOUNTER
Verified pt name and . Pt states she is feeling a little better today but started having diarrhea last evening, pt had three stools last night and two stools this morning. Pt states stools were watery but didn't note color of stools.      Reviewed doctor

## 2020-03-17 NOTE — TELEPHONE ENCOUNTER
Considering that you are having diarrhea, the suppositories not going to help. I would take some Pepto-Bismol or perhaps some Imodium over-the-counter just to slow down the diarrhea and of course keep yourself hydrated.   A brat diet will of course be help

## 2020-03-17 NOTE — TELEPHONE ENCOUNTER
Verified pt name and . Reviewed doctor's recommendations as noted below. Pt states she did take some Pepto Bismol tablets this morning. Pt states she will call back if no improvement in symptoms.

## 2020-05-11 ENCOUNTER — TELEPHONE (OUTPATIENT)
Dept: FAMILY MEDICINE CLINIC | Facility: CLINIC | Age: 60
End: 2020-05-11

## 2020-05-11 NOTE — TELEPHONE ENCOUNTER
Current Outpatient Medications   Medication Sig Dispense Refill   • ATORVASTATIN 20 MG Oral Tab TAKE 1 TABLET(20 MG) BY MOUTH EVERY NIGHT 90 tablet 1     Message:  Plan does not cover this medication at more than max 120 days supply in 365 days.  Please manasa

## 2020-05-12 NOTE — TELEPHONE ENCOUNTER
Spoke to pharmacist for clarification. Medication not being covered is Omeprazole not Atorvastatin. Prior authorization for Omeprazole completed w/ [employer] on cover my meds Key: [id number], turn around time 3-5 days.

## 2020-05-12 NOTE — TELEPHONE ENCOUNTER
Prior authorization has been denied for Omeprazole. Patients plan states medication is not covered due to Proton Pump Inhibitors Quantity Limit program criteria. Patient is able to get up to 120 days within 365 days, quantity has been reached.      Piepr

## 2020-05-18 ENCOUNTER — OFFICE VISIT (OUTPATIENT)
Dept: FAMILY MEDICINE CLINIC | Facility: CLINIC | Age: 60
End: 2020-05-18
Payer: MEDICAID

## 2020-05-18 VITALS
BODY MASS INDEX: 28.12 KG/M2 | HEART RATE: 79 BPM | SYSTOLIC BLOOD PRESSURE: 130 MMHG | WEIGHT: 168.81 LBS | TEMPERATURE: 99 F | HEIGHT: 65 IN | DIASTOLIC BLOOD PRESSURE: 68 MMHG

## 2020-05-18 DIAGNOSIS — K21.9 GASTROESOPHAGEAL REFLUX DISEASE, ESOPHAGITIS PRESENCE NOT SPECIFIED: ICD-10-CM

## 2020-05-18 DIAGNOSIS — R51.9 FRONTAL HEADACHE: ICD-10-CM

## 2020-05-18 DIAGNOSIS — F32.A DEPRESSIVE DISORDER: ICD-10-CM

## 2020-05-18 DIAGNOSIS — H10.13 ALLERGIC CONJUNCTIVITIS OF BOTH EYES: ICD-10-CM

## 2020-05-18 DIAGNOSIS — E78.00 HYPERCHOLESTEREMIA: ICD-10-CM

## 2020-05-18 DIAGNOSIS — R05.9 COUGH: ICD-10-CM

## 2020-05-18 DIAGNOSIS — J30.89 OTHER ALLERGIC RHINITIS: ICD-10-CM

## 2020-05-18 DIAGNOSIS — F41.9 ANXIETY: ICD-10-CM

## 2020-05-18 DIAGNOSIS — G44.209 TENSION HEADACHE: ICD-10-CM

## 2020-05-18 DIAGNOSIS — L30.9 DERMATITIS: Primary | ICD-10-CM

## 2020-05-18 DIAGNOSIS — G47.10 HYPERSOMNIA: ICD-10-CM

## 2020-05-18 PROCEDURE — 99214 OFFICE O/P EST MOD 30 MIN: CPT | Performed by: FAMILY MEDICINE

## 2020-05-18 RX ORDER — ATORVASTATIN CALCIUM 20 MG/1
20 TABLET, FILM COATED ORAL NIGHTLY
Qty: 90 TABLET | Refills: 0 | Status: SHIPPED | OUTPATIENT
Start: 2020-05-18 | End: 2020-08-31

## 2020-05-18 RX ORDER — FLUTICASONE PROPIONATE 50 MCG
2 SPRAY, SUSPENSION (ML) NASAL DAILY
Qty: 3 BOTTLE | Refills: 1 | Status: SHIPPED | OUTPATIENT
Start: 2020-05-18 | End: 2020-09-15

## 2020-05-18 RX ORDER — OMEPRAZOLE 40 MG/1
40 CAPSULE, DELAYED RELEASE ORAL DAILY
Qty: 90 CAPSULE | Refills: 1 | Status: SHIPPED | OUTPATIENT
Start: 2020-05-18 | End: 2020-11-25

## 2020-05-18 RX ORDER — AMITRIPTYLINE HYDROCHLORIDE 10 MG/1
20 TABLET, FILM COATED ORAL NIGHTLY
Qty: 180 TABLET | Refills: 1 | Status: SHIPPED | OUTPATIENT
Start: 2020-05-18 | End: 2020-10-19

## 2020-05-18 RX ORDER — MONTELUKAST SODIUM 10 MG/1
10 TABLET ORAL NIGHTLY
Qty: 90 TABLET | Refills: 1 | Status: SHIPPED | OUTPATIENT
Start: 2020-05-18 | End: 2020-11-14

## 2020-05-18 RX ORDER — ESCITALOPRAM OXALATE 20 MG/1
TABLET ORAL
Qty: 90 TABLET | Refills: 1 | Status: SHIPPED | OUTPATIENT
Start: 2020-05-18 | End: 2020-10-19

## 2020-05-18 NOTE — PROGRESS NOTES
Patient ID: Lily Willingham is a 61year old female. HPI  Patient presents with: Follow - Up: echocardiogram  Insomnia  Rash: legs    Last seen by me on 1/9/20. She continues to smoke 4-5 cigarettes per day.      I reviewed the results of her echocardio well.      Wt Readings from Last 6 Encounters:  05/18/20 : 168 lb 12.8 oz (76.6 kg)  02/15/20 : 165 lb (74.8 kg)  02/15/20 : 145 lb (65.8 kg)  01/09/20 : 168 lb 6.4 oz (76.4 kg)  08/01/19 : 169 lb 12.8 oz (77 kg)  05/24/19 : 170 lb 9.6 oz (77.4 kg)      BM Oral Tab Take 1 tablet (10 mg total) by mouth nightly. 1 by mouth at nighttime. 90 tablet 1     Allergies:No Known Allergies     Physical Exam:       Physical Exam   Physical Exam   Constitutional: Patient is oriented to person, place, and time.  Patient ap mouth nightly. Gastroesophageal reflux disease, esophagitis presence not specified  -     Omeprazole 40 MG Oral Capsule Delayed Release; Take 1 capsule (40 mg total) by mouth daily.   She states that the omeprazole does help but not at 20 mg and she is t [facility]. REG. PROF; Future    Allergic conjunctivitis of both eyes  -     Montelukast Sodium (SINGULAIR) 10 MG Oral Tab; Take 1 tablet (10 mg total) by mouth nightly. -     Fluticasone Propionate 50 MCG/ACT Nasal Suspension; 2 sprays by Nasal route daily.  Sq

## 2020-05-19 ENCOUNTER — APPOINTMENT (OUTPATIENT)
Dept: LAB | Age: 60
End: 2020-05-19
Attending: FAMILY MEDICINE
Payer: MEDICAID

## 2020-05-19 DIAGNOSIS — R05.9 COUGH: ICD-10-CM

## 2020-05-19 DIAGNOSIS — R51.9 FRONTAL HEADACHE: ICD-10-CM

## 2020-05-19 DIAGNOSIS — J30.89 OTHER ALLERGIC RHINITIS: ICD-10-CM

## 2020-05-19 PROCEDURE — 36415 COLL VENOUS BLD VENIPUNCTURE: CPT

## 2020-05-19 PROCEDURE — 82785 ASSAY OF IGE: CPT

## 2020-05-19 PROCEDURE — 86003 ALLG SPEC IGE CRUDE XTRC EA: CPT

## 2020-06-26 ENCOUNTER — E-VISIT (OUTPATIENT)
Dept: FAMILY MEDICINE CLINIC | Facility: CLINIC | Age: 60
End: 2020-06-26

## 2020-06-26 DIAGNOSIS — M54.40 ACUTE BACK PAIN WITH SCIATICA, UNSPECIFIED LATERALITY: Primary | ICD-10-CM

## 2020-06-26 PROCEDURE — 99422 OL DIG E/M SVC 11-20 MIN: CPT | Performed by: NURSE PRACTITIONER

## 2020-06-26 RX ORDER — CYCLOBENZAPRINE HCL 10 MG
TABLET ORAL
Qty: 30 TABLET | Refills: 0 | Status: SHIPPED | OUTPATIENT
Start: 2020-06-26 | End: 2022-01-20

## 2020-06-26 RX ORDER — NAPROXEN 500 MG/1
500 TABLET ORAL 2 TIMES DAILY WITH MEALS
Qty: 30 TABLET | Refills: 0 | Status: SHIPPED | OUTPATIENT
Start: 2020-06-26 | End: 2022-01-20

## 2020-06-26 NOTE — PROGRESS NOTES
Georgette Parry is a 61year old female. HPI:   See answers to questions above. Current Outpatient Medications   Medication Sig Dispense Refill   • naproxen 500 MG Oral Tab Take 1 tablet (500 mg total) by mouth 2 (two) times daily with meals.  30 tabl Packs/day: 0.25        Years: 32.00        Pack years: 8      Smokeless tobacco: Never Used      Tobacco comment: 5 cigarettes/day    Alcohol use: No      Alcohol/week: 0.0 standard drinks    Drug use: No        ASSESSMENT AND PLAN:     Acute back pain wit

## 2020-06-26 NOTE — PATIENT INSTRUCTIONS
Back Pain (Acute or Chronic)     Back pain is one of the most common problems. The good news is that most people feel better in 1 to 2 weeks, and most of the rest in 1 to 2 months. Most people can remain active.   People who have pain describe it differen Try this home care advice:  · When in bed, try to find a position of comfort. A firm mattress is best. Try lying flat on your back with pillows under your knees.  You can also try lying on your side with your knees bent up toward your chest and a pillow bet · You may use over-the-counter medicine as directed on the bottle to control pain, unless another pain medicine was prescribed.  If you have chronic conditions like diabetes, liver or kidney disease, stomach ulcers, or gastrointestinal bleeding, or are taki

## 2020-08-28 DIAGNOSIS — E78.00 HYPERCHOLESTEREMIA: ICD-10-CM

## 2020-08-31 RX ORDER — ATORVASTATIN CALCIUM 20 MG/1
TABLET, FILM COATED ORAL
Qty: 90 TABLET | Refills: 0 | Status: SHIPPED | OUTPATIENT
Start: 2020-08-31 | End: 2020-11-23

## 2020-09-13 ENCOUNTER — PATIENT MESSAGE (OUTPATIENT)
Dept: FAMILY MEDICINE CLINIC | Facility: CLINIC | Age: 60
End: 2020-09-13

## 2020-09-14 ENCOUNTER — NURSE TRIAGE (OUTPATIENT)
Dept: FAMILY MEDICINE CLINIC | Facility: CLINIC | Age: 60
End: 2020-09-14

## 2020-09-14 NOTE — TELEPHONE ENCOUNTER
From: Carmine Caraballo  To:  Job Jacques DO  Sent: 9/13/2020 5:56 PM CDT  Subject: Prescription Question    Hi Dr. Charmayne Maser this is ne jaiden I have a bad inflammation on my gum if you can prescribe me naproxen 500 mg and some antibiotic I appreciate it

## 2020-09-14 NOTE — TELEPHONE ENCOUNTER
----- Message from Giovanna Leon sent at 9/13/2020  5:56 PM CDT -----  Regarding: Prescription Question  Contact: [phone]  Hi Dr. Louisa Gonzales this is ne jaiden I have a bad inflammation on my gum if you can prescribe me naproxen 500 mg and some antib

## 2020-09-15 NOTE — TELEPHONE ENCOUNTER
Action Requested: Summary for Provider     []  Critical Lab, Recommendations Needed  [x] Need Additional Advice  []   FYI    []   Need Orders  [] Need Medications Sent to Pharmacy  []  Other     SUMMARY: Patient reports since Sunday has been having pain to

## 2020-10-07 ENCOUNTER — NURSE TRIAGE (OUTPATIENT)
Dept: FAMILY MEDICINE CLINIC | Facility: CLINIC | Age: 60
End: 2020-10-07

## 2020-10-07 NOTE — TELEPHONE ENCOUNTER
Action Requested: Summary for Provider     []  Critical Lab, Recommendations Needed  [x] Need Additional Advice  []   FYI    []   Need Orders  [] Need Medications Sent to Pharmacy  []  Other     SUMMARY: pt asking if she can increase Valacyclovir from one

## 2020-10-08 ENCOUNTER — NURSE TRIAGE (OUTPATIENT)
Dept: FAMILY MEDICINE CLINIC | Facility: CLINIC | Age: 60
End: 2020-10-08

## 2020-10-08 ENCOUNTER — APPOINTMENT (OUTPATIENT)
Dept: LAB | Age: 60
End: 2020-10-08
Attending: NURSE PRACTITIONER
Payer: MEDICAID

## 2020-10-08 ENCOUNTER — TELEMEDICINE (OUTPATIENT)
Dept: FAMILY MEDICINE CLINIC | Facility: CLINIC | Age: 60
End: 2020-10-08
Payer: MEDICAID

## 2020-10-08 DIAGNOSIS — Z20.822 ENCOUNTER BY TELEHEALTH FOR SUSPECTED COVID-19: Primary | ICD-10-CM

## 2020-10-08 DIAGNOSIS — Z20.822 ENCOUNTER BY TELEHEALTH FOR SUSPECTED COVID-19: ICD-10-CM

## 2020-10-08 PROCEDURE — 99213 OFFICE O/P EST LOW 20 MIN: CPT | Performed by: NURSE PRACTITIONER

## 2020-10-08 NOTE — TELEPHONE ENCOUNTER
Patient contacted office. States she is upset and wants a covid test.  Patient scheduled with Conor Kramer for today at 2pm. [facility] No further questions.

## 2020-10-08 NOTE — PROGRESS NOTES
HPI    Virtual Telephone/Video Doximity Check-In    Barbi Bhandari verbally consents to a Virtual/Telephone Check-In visit on 10/08/20.   Patient has been referred to the [facility] website at [email or web address] to review the yearly Consent to Treat crowrashad Father    • Schizophrenia Mother    • Cancer Daughter         leukemia   • Other (lymphoma) Brother    • Breast Cancer Neg    • Ovarian Cancer Neg        Social History    Socioeconomic History      Marital status:       Spouse name: Not on file Occupational Exposure: Not Asked        Hobby Hazards: Not Asked        Sleep Concern: Not Asked        Back Care: Not Asked        Bike Helmet: Not Asked        Self-Exams: Not Asked    Social History Narrative      Not on file      Current Outpatient Med given.  Supportive care discussed. ER red flags discussed. Patient verbalized understanding. Discussed plan of care with patient and patient is in agreement. All questions answered. Patient to call with questions or concerns.     Encouraged to sign

## 2020-10-08 NOTE — TELEPHONE ENCOUNTER
Action Requested: Summary for Provider     []  Critical Lab, Recommendations Needed  [] Need Additional Advice  []   FYI    []   Need Orders  [] Need Medications Sent to Pharmacy  []  Other     SUMMARY:Pt stated she have been with her uncle past few days

## 2020-10-15 ENCOUNTER — TELEPHONE (OUTPATIENT)
Dept: FAMILY MEDICINE CLINIC | Facility: CLINIC | Age: 60
End: 2020-10-15

## 2020-10-15 DIAGNOSIS — A60.00 GENITAL HERPES SIMPLEX, UNSPECIFIED SITE: ICD-10-CM

## 2020-10-15 RX ORDER — VALACYCLOVIR HYDROCHLORIDE 500 MG/1
500 TABLET, FILM COATED ORAL DAILY
Qty: 90 TABLET | Refills: 2 | Status: SHIPPED | OUTPATIENT
Start: 2020-10-15 | End: 2021-05-03

## 2020-10-17 NOTE — TELEPHONE ENCOUNTER
I will see her Monday. Her rash on the face and the buttocks does not sound like shingles as it is quite too far away from each other.

## 2020-10-17 NOTE — TELEPHONE ENCOUNTER
Ask her if she finally did see the dentist.  Jelani Aguirre that my nurse practitioner and I were not able to get back to her.

## 2020-10-17 NOTE — TELEPHONE ENCOUNTER
Spoke to patient and she did see the dentist.     She is discussing another issue during the call regarding spots on her face and buttocks which she believes is shingles so she's requesting a refill for valACYclovir HCl 500 MG Oral Tab which was filled by

## 2020-10-19 ENCOUNTER — OFFICE VISIT (OUTPATIENT)
Dept: FAMILY MEDICINE CLINIC | Facility: CLINIC | Age: 60
End: 2020-10-19
Payer: MEDICAID

## 2020-10-19 VITALS
WEIGHT: 169.38 LBS | DIASTOLIC BLOOD PRESSURE: 74 MMHG | HEART RATE: 71 BPM | TEMPERATURE: 98 F | SYSTOLIC BLOOD PRESSURE: 110 MMHG | BODY MASS INDEX: 28.22 KG/M2 | HEIGHT: 65 IN

## 2020-10-19 DIAGNOSIS — Z12.11 SCREENING FOR COLON CANCER: ICD-10-CM

## 2020-10-19 DIAGNOSIS — Z23 NEED FOR VACCINATION: ICD-10-CM

## 2020-10-19 DIAGNOSIS — L30.9 ECZEMA, UNSPECIFIED TYPE: Primary | ICD-10-CM

## 2020-10-19 DIAGNOSIS — F17.210 CIGARETTE SMOKER: ICD-10-CM

## 2020-10-19 DIAGNOSIS — Z12.4 CERVICAL CANCER SCREENING: ICD-10-CM

## 2020-10-19 DIAGNOSIS — L30.9 DERMATITIS: ICD-10-CM

## 2020-10-19 PROCEDURE — 90471 IMMUNIZATION ADMIN: CPT | Performed by: FAMILY MEDICINE

## 2020-10-19 PROCEDURE — 3078F DIAST BP <80 MM HG: CPT | Performed by: FAMILY MEDICINE

## 2020-10-19 PROCEDURE — 99214 OFFICE O/P EST MOD 30 MIN: CPT | Performed by: FAMILY MEDICINE

## 2020-10-19 PROCEDURE — 3008F BODY MASS INDEX DOCD: CPT | Performed by: FAMILY MEDICINE

## 2020-10-19 PROCEDURE — 99406 BEHAV CHNG SMOKING 3-10 MIN: CPT | Performed by: FAMILY MEDICINE

## 2020-10-19 PROCEDURE — 90732 PPSV23 VACC 2 YRS+ SUBQ/IM: CPT | Performed by: FAMILY MEDICINE

## 2020-10-19 PROCEDURE — 3074F SYST BP LT 130 MM HG: CPT | Performed by: FAMILY MEDICINE

## 2020-10-19 PROCEDURE — 90686 IIV4 VACC NO PRSV 0.5 ML IM: CPT | Performed by: FAMILY MEDICINE

## 2020-10-19 PROCEDURE — 90472 IMMUNIZATION ADMIN EACH ADD: CPT | Performed by: FAMILY MEDICINE

## 2020-10-19 RX ORDER — BETAMETHASONE DIPROPIONATE 0.5 MG/G
1 CREAM TOPICAL 2 TIMES DAILY
Qty: 30 G | Refills: 0 | Status: SHIPPED | OUTPATIENT
Start: 2020-10-19 | End: 2020-11-05

## 2020-10-19 NOTE — PROGRESS NOTES
Tobacco Cessation Documentation (Smoking and Smokeless included): I had an in depth therapy session with Georgette Parry about her tobacco use risks and options using the [employer]'s Five A's approach:    Ask: Sharlene Werner is using tobacco products. Assess:  Richad Sheets

## 2020-10-19 NOTE — PROGRESS NOTES
Patient ID: Hieu Grande is a 61year old female. HPI  Patient presents with:   Follow - Up    Kalin Olson RN   Registered Nurse   Specialty:  Registered Nurse   Telephone Encounter   Signed   Encounter Date:  9/14/2020               Signed motivated her to see gynecology. She had a normal mammogram in January 2020. She is due for colonoscopy. Pt has had four negative COVID-19 tests since July 2020. Pt is due for immunizations. Pt will receive a flu shot today.       Wt Readings from [place] tablet 0   • triamcinolone acetonide 0.1 % External Cream Apply topically 2 (two) times daily as needed. 30 g 0   •       • Omeprazole 40 MG Oral Capsule Delayed Release Take 1 capsule (40 mg total) by mouth daily.  90 capsule 1   •       • Montelukast Sodi AF) 0.05 % External Cream; Apply 1 Application topically 2 (two) times daily. As needed to the rash on the buttocks and a very small amount to the rash on the face for flareups only. Do not use long-term.     Cigarette smoker  -     BEHAV CHNG SMOKING GR T help you successfully quit. · Stick with your quit date! · Tell friends, family, and coworkers your quit date. Request their understanding and support. · Anticipate and prepare for challenges.  Some examples are withdrawal symptoms, being around others

## 2020-10-20 ENCOUNTER — TELEPHONE (OUTPATIENT)
Dept: FAMILY MEDICINE CLINIC | Facility: CLINIC | Age: 60
End: 2020-10-20

## 2020-10-20 DIAGNOSIS — L30.9 ECZEMA, UNSPECIFIED TYPE: ICD-10-CM

## 2020-10-20 DIAGNOSIS — L30.9 DERMATITIS: ICD-10-CM

## 2020-10-20 NOTE — TELEPHONE ENCOUNTER
Prior authorization for betamethasone dipropionate aug has been initiated through [employer] using keycode: [id number] It takes about 1-5 business days for a decision to come back.

## 2020-11-03 NOTE — TELEPHONE ENCOUNTER
Dr. Hernandez, patients plan does not cover Betamethasone Dipropionate.      Betamethasone 0.05 % cream is not covered, alternatives are:  -Alclometasone Dipropionate   -Betamethasone Valerate  -Clobetasol Propionate  -Fluticasone Propionate  -Hydrocortisone cream

## 2020-11-05 RX ORDER — CLOBETASOL PROPIONATE 0.5 MG/G
1 CREAM TOPICAL 2 TIMES DAILY
Qty: 45 G | Refills: 0 | Status: SHIPPED | OUTPATIENT
Start: 2020-11-05 | End: 2021-10-05

## 2020-11-23 DIAGNOSIS — E78.00 HYPERCHOLESTEREMIA: ICD-10-CM

## 2020-11-23 RX ORDER — ATORVASTATIN CALCIUM 20 MG/1
TABLET, FILM COATED ORAL
Qty: 90 TABLET | Refills: 0 | Status: SHIPPED | OUTPATIENT
Start: 2020-11-23 | End: 2021-03-01

## 2020-11-24 DIAGNOSIS — K21.9 GASTROESOPHAGEAL REFLUX DISEASE: ICD-10-CM

## 2020-11-24 NOTE — TELEPHONE ENCOUNTER
•  Omeprazole 40 MG Oral Capsule Delayed Release, Take 1 capsule (40 mg total) by mouth daily. , Disp: 90 capsule, Rfl: 1

## 2020-11-25 RX ORDER — OMEPRAZOLE 40 MG/1
40 CAPSULE, DELAYED RELEASE ORAL DAILY
Qty: 90 CAPSULE | Refills: 1 | Status: SHIPPED | OUTPATIENT
Start: 2020-11-25 | End: 2021-03-02

## 2021-03-01 DIAGNOSIS — E78.00 HYPERCHOLESTEREMIA: ICD-10-CM

## 2021-03-01 RX ORDER — ATORVASTATIN CALCIUM 20 MG/1
20 TABLET, FILM COATED ORAL NIGHTLY
Qty: 90 TABLET | Refills: 1 | Status: SHIPPED | OUTPATIENT
Start: 2021-03-01 | End: 2021-03-02

## 2021-03-01 NOTE — TELEPHONE ENCOUNTER
Current Outpatient Medications:     •  ATORVASTATIN 20 MG Oral Tab, TAKE 1 TABLET BY MOUTH EVERY NIGHT AT BEDTIME, Disp: 90 tablet, Rfl: 0

## 2021-03-02 DIAGNOSIS — E78.00 HYPERCHOLESTEREMIA: ICD-10-CM

## 2021-03-02 DIAGNOSIS — K21.9 GASTROESOPHAGEAL REFLUX DISEASE: ICD-10-CM

## 2021-03-04 RX ORDER — ATORVASTATIN CALCIUM 20 MG/1
20 TABLET, FILM COATED ORAL NIGHTLY
Qty: 90 TABLET | Refills: 1 | Status: SHIPPED | OUTPATIENT
Start: 2021-03-04 | End: 2021-10-05

## 2021-03-04 RX ORDER — OMEPRAZOLE 40 MG/1
40 CAPSULE, DELAYED RELEASE ORAL DAILY
Qty: 90 CAPSULE | Refills: 1 | Status: SHIPPED | OUTPATIENT
Start: 2021-03-04 | End: 2021-10-05

## 2021-04-29 ENCOUNTER — TELEMEDICINE (OUTPATIENT)
Dept: FAMILY MEDICINE CLINIC | Facility: CLINIC | Age: 61
End: 2021-04-29

## 2021-04-29 ENCOUNTER — NURSE TRIAGE (OUTPATIENT)
Dept: FAMILY MEDICINE CLINIC | Facility: CLINIC | Age: 61
End: 2021-04-29

## 2021-04-29 ENCOUNTER — LAB ENCOUNTER (OUTPATIENT)
Dept: LAB | Age: 61
End: 2021-04-29
Attending: FAMILY MEDICINE
Payer: MEDICAID

## 2021-04-29 DIAGNOSIS — R51.9 HEADACHE, UNSPECIFIED HEADACHE TYPE: ICD-10-CM

## 2021-04-29 DIAGNOSIS — R50.9 FEVER, UNSPECIFIED FEVER CAUSE: ICD-10-CM

## 2021-04-29 DIAGNOSIS — R05.9 COUGH: Primary | ICD-10-CM

## 2021-04-29 DIAGNOSIS — R05.9 COUGH: ICD-10-CM

## 2021-04-29 PROCEDURE — 99213 OFFICE O/P EST LOW 20 MIN: CPT | Performed by: FAMILY MEDICINE

## 2021-04-29 RX ORDER — PROMETHAZINE HYDROCHLORIDE AND CODEINE PHOSPHATE 6.25; 1 MG/5ML; MG/5ML
5 SYRUP ORAL EVERY 6 HOURS PRN
Qty: 180 ML | Refills: 0 | Status: SHIPPED | OUTPATIENT
Start: 2021-04-29 | End: 2022-01-20

## 2021-04-29 NOTE — PROGRESS NOTES
VIDEO VISIT PROGRESS NOTE  Todays date: 4/29/2021 2:36 PM        Most recent Nurse Triage message / Matty Camilo message from patient:      Roberto Carlos Vance RN   Registered Nurse   Specialty:  Registered Nurse   Telephone Encounter   Signed   Encounter Date: mg) OR ibuprofen every 6-8 hours (Adults 400 mg). Before taking any medicine, read all the instructions on the package. · Sore throat: Try throat lozenges, hard candy or warm chicken broth.   Per the CDC guidelines, you should remain under home isolation p the person on the video with me is talking to the patient. This also goes for family members who would rather speak to me on behalf of their [place]-speaking (or another foreign language) patient.   The patient will give consent but I will be speaking to • Post Nasal Drip Yes []     No [x]         Past medical/social history:   • Hypertension: Yes []     No [x]     • Allergic Rhinitis: Yes []     No [x]   • Anxiety:  Yes []     No [x]   • Asthma/COPD/other respiratory: Yes []     No [x]     • Diabetes:  Kevin Lawson discussed and patient/family member understands and agrees to plan. No follow-ups on file. There are no Patient Instructions on file for this visit.           Medical History:         Reviewed Allergies:  No Known Allergies   Reviewed Past Medical Hi care. Patient advised to go to ER or call 911 for worsening symptoms or acute distress. (NOTE: Not every complaint above will be related to the COVID-19 pandemic).     Please note that the following visit was completed using two-way, real-time interactive a

## 2021-04-29 NOTE — TELEPHONE ENCOUNTER
----- Message from Christina Cardona sent at 4/29/2021 10:08 AM CDT -----  Regarding: Other  Contact: [phone]  Hi I really feel sick since yesterday with fever and sore throat really bad mucus coughing I've been taking Tylenol but I feel really bad bad h

## 2021-04-29 NOTE — TELEPHONE ENCOUNTER
Action Requested: Summary for Provider     []  Critical Lab, Recommendations Needed  [] Need Additional Advice  [x]   FYI    []   Need Orders  [] Need Medications Sent to Pharmacy  []  Other     SUMMARY: Patient states she was at a party this past Sunday. least 24 hours have passed since recovery defined as resolution of fever without the use of fever-reducing medications; and  ? Improvement in respiratory symptoms (e.g., cough, shortness of breath); and  ?  At least 10 days have passed since symptoms first

## 2021-04-30 ENCOUNTER — TELEPHONE (OUTPATIENT)
Dept: FAMILY MEDICINE CLINIC | Facility: CLINIC | Age: 61
End: 2021-04-30

## 2021-04-30 RX ORDER — MELOXICAM 15 MG/1
15 TABLET ORAL DAILY PRN
Qty: 30 TABLET | Refills: 0 | Status: SHIPPED | OUTPATIENT
Start: 2021-04-30 | End: 2021-06-03

## 2021-04-30 RX ORDER — BENZONATATE 200 MG/1
200 CAPSULE ORAL 3 TIMES DAILY PRN
Qty: 30 CAPSULE | Refills: 0 | Status: SHIPPED | OUTPATIENT
Start: 2021-04-30 | End: 2022-01-20

## 2021-04-30 RX ORDER — CYCLOBENZAPRINE HCL 10 MG
10 TABLET ORAL 3 TIMES DAILY
Qty: 30 TABLET | Refills: 1 | Status: SHIPPED | OUTPATIENT
Start: 2021-04-30 | End: 2021-05-20

## 2021-04-30 NOTE — TELEPHONE ENCOUNTER
Patient advised of recommendations agreed with plan. She will follow up if symptoms not improved in a few days.

## 2021-04-30 NOTE — TELEPHONE ENCOUNTER
Patient reports virtual visit yesterday, had Covid test done and was negative. Reports to continue with sinus pain to forehead and cheeks, nasal congestion, headache, cough and back pain. Denied fever or sore throat today.  Requesting antibiotic for sinus i

## 2021-04-30 NOTE — TELEPHONE ENCOUNTER
Mobic 15 mg PO every day and Flexeril 10 mg as needed for back pain and Tessalon 200 mg PO TID as needed for cough sent to pharmacy. Please advise patient continue with Flonase : 2 puffs once a day and Netti pot.  Increase fluid intake and take Tylenol or M

## 2021-04-30 NOTE — TELEPHONE ENCOUNTER
Pt called to follow up. States she was not taking Meloxicam for back pain, med was Cyclobenzaprine and is asking for a refill of that medication. Pt states nasal congestion and sinus pain are getting worse.  Pt states she is using Fluconazole nasal spray th

## 2021-05-03 ENCOUNTER — TELEPHONE (OUTPATIENT)
Dept: FAMILY MEDICINE CLINIC | Facility: CLINIC | Age: 61
End: 2021-05-03

## 2021-05-03 ENCOUNTER — PATIENT MESSAGE (OUTPATIENT)
Dept: FAMILY MEDICINE CLINIC | Facility: CLINIC | Age: 61
End: 2021-05-03

## 2021-05-03 DIAGNOSIS — A60.00 GENITAL HERPES SIMPLEX, UNSPECIFIED SITE: ICD-10-CM

## 2021-05-03 RX ORDER — VALACYCLOVIR HYDROCHLORIDE 500 MG/1
500 TABLET, FILM COATED ORAL DAILY
Qty: 90 TABLET | Refills: 2 | Status: SHIPPED | OUTPATIENT
Start: 2021-05-03

## 2021-05-03 RX ORDER — AMOXICILLIN AND CLAVULANATE POTASSIUM 875; 125 MG/1; MG/1
1 TABLET, FILM COATED ORAL 2 TIMES DAILY
Qty: 20 TABLET | Refills: 0 | Status: SHIPPED | OUTPATIENT
Start: 2021-05-03 | End: 2021-05-13

## 2021-05-03 NOTE — TELEPHONE ENCOUNTER
Per pharmacy pt is requesting refill for the following medication. •  valACYclovir HCl 500 MG Oral Tab, Take 1 tablet (500 mg total) by mouth daily. , Disp: 90 tablet, Rfl: 2

## 2021-05-03 NOTE — TELEPHONE ENCOUNTER
Patient had virtual visit 4/29, her covid test was neg. She followed up with our office 4/30 since symptoms had not improved.  Reports was following recommendations from on-call provider but her insurance would not pay for Tessalon and her symptoms have bee

## 2021-05-04 NOTE — TELEPHONE ENCOUNTER
From: Carmine Caraballo  To:  Job Jacques DO  Sent: 5/3/2021 3:46 PM CDT  Subject: Prescription Question    Hi I talked to the nurse this morning that I have a lot of mucus and I have a bad cough and I need antibiotic it's not gonna go away just alec Dodson

## 2021-05-04 NOTE — TELEPHONE ENCOUNTER
[employer] message with MD recommendation sent to pt. RN to f/u if pt view [employer] message. TY      No future appointments.

## 2021-05-04 NOTE — TELEPHONE ENCOUNTER
I sent in Augmentin twice daily for 10 days which hopefully should help with cough and infection. It is an antibiotic.

## 2021-06-03 RX ORDER — MELOXICAM 15 MG/1
15 TABLET ORAL DAILY PRN
Qty: 30 TABLET | Refills: 0 | Status: SHIPPED | OUTPATIENT
Start: 2021-06-03

## 2021-06-03 NOTE — TELEPHONE ENCOUNTER
•  Meloxicam 15 MG Oral Tab, Take 1 tablet (15 mg total) by mouth daily as needed for Pain., Disp: 30 tablet, Rfl: 0

## 2021-06-22 ENCOUNTER — HOSPITAL ENCOUNTER (OUTPATIENT)
Age: 61
Discharge: HOME | End: 2021-06-22
Payer: MEDICAID

## 2021-06-22 VITALS
HEART RATE: 80 BPM | HEIGHT: 66 IN | SYSTOLIC BLOOD PRESSURE: 115 MMHG | BODY MASS INDEX: 27.32 KG/M2 | RESPIRATION RATE: 18 BRPM | OXYGEN SATURATION: 99 % | TEMPERATURE: 98 F | WEIGHT: 170 LBS | DIASTOLIC BLOOD PRESSURE: 62 MMHG

## 2021-06-22 DIAGNOSIS — H60.501 ACUTE OTITIS EXTERNA OF RIGHT EAR, UNSPECIFIED TYPE: Primary | ICD-10-CM

## 2021-06-22 PROCEDURE — 99213 OFFICE O/P EST LOW 20 MIN: CPT | Performed by: NURSE PRACTITIONER

## 2021-06-22 RX ORDER — AMOXICILLIN 875 MG/1
875 TABLET, COATED ORAL 2 TIMES DAILY
Qty: 20 TABLET | Refills: 0 | Status: SHIPPED | OUTPATIENT
Start: 2021-06-22 | End: 2021-07-02

## 2021-06-22 NOTE — ED PROVIDER NOTES
Patient Seen in: [facility] [place]      History   Patient presents with:  Ear Pain    Stated Complaint: ear pain x 1 day    HPI/Subjective:   HPI    26-year-old female presents to the immediate care with right ear canal pain and itching that started normal.      Ears:      Comments: No pain with movement of the pinna no mastoid tenderness redness or swelling  Patient has minimal tenderness with movement of the pinna and evaluation of the canal there is cerumen with scant debris difficult to visualize into the right ear 4 (four) times daily for 7 days. Qty: 10 mL Refills: 0    amoxicillin 875 MG Oral Tab  Take 1 tablet (875 mg total) by mouth 2 (two) times daily for 10 days.   Qty: 20 tablet Refills: 0

## 2021-10-05 DIAGNOSIS — E78.00 HYPERCHOLESTEREMIA: ICD-10-CM

## 2021-10-05 DIAGNOSIS — K21.9 GASTROESOPHAGEAL REFLUX DISEASE: ICD-10-CM

## 2021-10-08 RX ORDER — ATORVASTATIN CALCIUM 20 MG/1
20 TABLET, FILM COATED ORAL NIGHTLY
Qty: 90 TABLET | Refills: 1 | Status: SHIPPED | OUTPATIENT
Start: 2021-10-08

## 2021-10-08 RX ORDER — OMEPRAZOLE 40 MG/1
40 CAPSULE, DELAYED RELEASE ORAL DAILY
Qty: 90 CAPSULE | Refills: 1 | Status: SHIPPED | OUTPATIENT
Start: 2021-10-08

## 2021-10-08 RX ORDER — CLOBETASOL PROPIONATE 0.5 MG/G
1 CREAM TOPICAL 2 TIMES DAILY
Qty: 45 G | Refills: 0 | Status: SHIPPED | OUTPATIENT
Start: 2021-10-08

## 2021-12-28 ENCOUNTER — TELEMEDICINE (OUTPATIENT)
Dept: INTERNAL MEDICINE CLINIC | Facility: CLINIC | Age: 61
End: 2021-12-28

## 2021-12-28 ENCOUNTER — NURSE ONLY (OUTPATIENT)
Dept: LAB | Age: 61
End: 2021-12-28
Attending: INTERNAL MEDICINE
Payer: MEDICAID

## 2021-12-28 DIAGNOSIS — Z20.822 SUSPECTED COVID-19 VIRUS INFECTION: Primary | ICD-10-CM

## 2021-12-28 DIAGNOSIS — Z20.822 SUSPECTED COVID-19 VIRUS INFECTION: ICD-10-CM

## 2021-12-28 PROCEDURE — 99213 OFFICE O/P EST LOW 20 MIN: CPT | Performed by: INTERNAL MEDICINE

## 2021-12-28 NOTE — PROGRESS NOTES
Tiburcio Louise is a 64year old female. No chief complaint on file. Virtual/Telephone Check-In    Tiburcio Louise verbally consents to a [employer] on 12/28/21.   Patient has been referred to the [facility] website at [email or web address] back pain 30 tablet 0      Past Medical History:   Diagnosis Date   • Hyperlipidemia    •  (normal spontaneous vaginal delivery)     2      Past Surgical History:   Procedure Laterality Date   • APPENDECTOMY     • BACK SURGERY      low back zbigniew and time. Mental status is at baseline. Psychiatric:         Mood and Affect: Mood normal.         Behavior: Behavior normal.   Talking in full sentences. ASSESSMENT AND PLAN:   1.  Suspected COVID-19 virus infection  Because of close exposure, I hav

## 2021-12-30 ENCOUNTER — TELEPHONE (OUTPATIENT)
Dept: FAMILY MEDICINE CLINIC | Facility: CLINIC | Age: 61
End: 2021-12-30

## 2021-12-30 LAB — SARS-COV-2 RNA RESP QL NAA+PROBE: DETECTED

## 2021-12-30 NOTE — TELEPHONE ENCOUNTER
LOV 12/28/21  Covid positive 12/28/2021  Patient did not get a flu shot. Patient asking what is next as far as isolation guidelines. Patient advised of the COVID-19 isolation guidelines per CDC (December 2021):     For a patient who tests positive f

## 2021-12-31 NOTE — TELEPHONE ENCOUNTER
Patient states she feels\" bad \"pain, body aches in her back. Patient asking what she can take for pain. Patient advised to take tylenol or ibuprofen as directed. For cough over the counter medications like Robitussin or Delsym.  Advised to stay hydrated,

## 2022-01-05 NOTE — TELEPHONE ENCOUNTER
Patient states she needs note to return to work. Patient scheduled for video visit. Patient advised to complete the e-check in in [employer], if active. Understands to follow the prompts and links to complete the visit.     Patient advised that there may

## 2022-01-06 ENCOUNTER — TELEMEDICINE (OUTPATIENT)
Dept: FAMILY MEDICINE CLINIC | Facility: CLINIC | Age: 62
End: 2022-01-06

## 2022-01-06 DIAGNOSIS — R05.9 COUGH: ICD-10-CM

## 2022-01-06 DIAGNOSIS — R09.81 NASAL CONGESTION: ICD-10-CM

## 2022-01-06 DIAGNOSIS — U07.1 COVID-19 VIRUS INFECTION: Primary | ICD-10-CM

## 2022-01-06 DIAGNOSIS — J34.89 SINUS PRESSURE: ICD-10-CM

## 2022-01-06 PROCEDURE — 99213 OFFICE O/P EST LOW 20 MIN: CPT | Performed by: FAMILY MEDICINE

## 2022-01-06 RX ORDER — DEXAMETHASONE 4 MG/1
TABLET ORAL
Qty: 5 TABLET | Refills: 0 | Status: SHIPPED | OUTPATIENT
Start: 2022-01-06 | End: 2022-01-20

## 2022-01-06 RX ORDER — AMOXICILLIN AND CLAVULANATE POTASSIUM 875; 125 MG/1; MG/1
1 TABLET, FILM COATED ORAL 2 TIMES DAILY
Qty: 20 TABLET | Refills: 0 | Status: SHIPPED | OUTPATIENT
Start: 2022-01-06 | End: 2022-01-16

## 2022-01-06 NOTE — PROGRESS NOTES
VIDEO VISIT PROGRESS NOTE  Todays date: 1/6/2022 1:25 PM      Most recent Nurse Triage message / [address] message from patient:      Jessica Ambrocio RN   Registered Nurse   Specialty:  Registered Nurse   Telephone Encounter   Signed   Encounter Date:  15 I will be speaking to the person that would be translating.   Also some of these patients with possible COVID-19 infection or some type of other illness are too ill to be on video and would rather have a designated person speak for them and in that case we • Allergic Rhinitis: Yes []     No [x]   • Anxiety:  Yes []     No [x]   • Asthma/COPD/other respiratory: Yes []     No [x]     • Diabetes: Yes []     No [x]      • Heart disease: Yes []     No [x]      • Travel: Yes []     No [x]      • Sick contacts:  Lashawn Darden applicable)  No orders of the defined types were placed in this encounter. Follow up if symptoms persist.  Take medicine (if given) as prescribed. Approach to treatment discussed and patient/family member understands and agrees to plan.      No foll every 6 (six) hours as needed for cough. Can possibly make you tired. Do not drive or operate heavy machinery if it does. 180 mL 0   • naproxen 500 MG Oral Tab Take 1 tablet (500 mg total) by mouth 2 (two) times daily with meals.  30 tablet 0   • cyclobenz (if applicable) and agree that the record reflects my personal performance and is accurate and complete.   [facility], , 1/6/2022, 2:21 PM

## 2022-01-12 ENCOUNTER — PATIENT MESSAGE (OUTPATIENT)
Dept: FAMILY MEDICINE CLINIC | Facility: CLINIC | Age: 62
End: 2022-01-12

## 2022-01-12 ENCOUNTER — TELEPHONE (OUTPATIENT)
Dept: FAMILY MEDICINE CLINIC | Facility: CLINIC | Age: 62
End: 2022-01-12

## 2022-01-12 NOTE — TELEPHONE ENCOUNTER
From: Adrienne Rojo  To:  Nitish Mcnamara DO  Sent: 1/12/2022 4:54 PM CST  Subject: ne hwang    dawn Foss this his Jayde Dumont been having diarrhea since this morning bad pain in my stomach my wilbur hurt my legs my body his wek i still taking an

## 2022-01-12 NOTE — TELEPHONE ENCOUNTER
Patient states she began having diarrhea this morning \"a lot,\" and stopped at about 3 pm today. Has eaten half of a banana today and drinking water. Also states her legs are feeling weak \"like someone is pulling my legs. \"  In addition, her throat is h

## 2022-01-12 NOTE — TELEPHONE ENCOUNTER
----- Message from Eloy Pascual RN sent at 1/12/2022  5:06 PM CST -----  Regarding: FW: ne hwang      ----- Message -----  From: Corrine Javier  Sent: 1/12/2022   4:54 PM CST  To: [facility] Triage  Subject: ne hwang

## 2022-01-13 ENCOUNTER — TELEPHONE (OUTPATIENT)
Dept: FAMILY MEDICINE CLINIC | Facility: CLINIC | Age: 62
End: 2022-01-13

## 2022-01-13 NOTE — TELEPHONE ENCOUNTER
Pt states she is feeling better today. Pt states she does not want to go the ER. Pt states she is taking Tylenol. Reports headache and cough. Continues to drink water and Gatorade. Advised BRAT diet. Pt states she needs another excuse letter for work.

## 2022-01-13 NOTE — TELEPHONE ENCOUNTER
Can try a brat diet, Gatorade, probiotics, and even Pepto-Bismol and see if that helps. Hopefully it is just a virus that will run its course over 48 to 72 hours. If however she becomes very dehydrated then she needs to go to the emergency room.

## 2022-01-13 NOTE — TELEPHONE ENCOUNTER
----- Message from Mark Bradshaw RN sent at 1/13/2022 10:21 AM CST -----  Regarding: FW: ne hwang      ----- Message -----  From: Nancy Gomez  Sent: 1/13/2022   9:50 AM CST  To: Elizabeth Rn Triage  Subject: ne hwang

## 2022-01-13 NOTE — TELEPHONE ENCOUNTER
Action Requested: Summary for Provider     []  Critical Lab, Recommendations Needed  [] Need Additional Advice  [x]   FYI    []   Need Orders  [] Need Medications Sent to Pharmacy  []  Other     SUMMARY: Verified name and  of patient.  Patient called in

## 2022-01-15 ENCOUNTER — APPOINTMENT (OUTPATIENT)
Dept: GENERAL RADIOLOGY | Facility: HOSPITAL | Age: 62
End: 2022-01-15

## 2022-01-15 ENCOUNTER — HOSPITAL ENCOUNTER (EMERGENCY)
Facility: HOSPITAL | Age: 62
Discharge: HOME | End: 2022-01-15

## 2022-01-15 VITALS
BODY MASS INDEX: 29.16 KG/M2 | HEIGHT: 65 IN | HEART RATE: 74 BPM | WEIGHT: 175 LBS | RESPIRATION RATE: 18 BRPM | DIASTOLIC BLOOD PRESSURE: 72 MMHG | TEMPERATURE: 99 F | SYSTOLIC BLOOD PRESSURE: 108 MMHG | OXYGEN SATURATION: 96 %

## 2022-01-15 DIAGNOSIS — R07.9 CHEST PAIN, UNSPECIFIED TYPE: ICD-10-CM

## 2022-01-15 DIAGNOSIS — R53.83 FATIGUE, UNSPECIFIED TYPE: Primary | ICD-10-CM

## 2022-01-15 LAB
ANION GAP SERPL CALC-SCNC: 4 MMOL/L (ref 0–18)
BASOPHILS # BLD AUTO: 0.06 X10(3) UL (ref 0–0.2)
BASOPHILS NFR BLD AUTO: 0.4 %
BUN BLD-MCNC: 20 MG/DL (ref 7–18)
BUN/CREAT SERPL: 25.6 (ref 10–20)
CALCIUM BLD-MCNC: 9.3 MG/DL (ref 8.5–10.1)
CHLORIDE SERPL-SCNC: 108 MMOL/L (ref 98–112)
CO2 SERPL-SCNC: 29 MMOL/L (ref 21–32)
CREAT BLD-MCNC: 0.78 MG/DL
D DIMER PPP FEU-MCNC: 0.37 UG/ML FEU (ref ?–0.61)
DEPRECATED RDW RBC AUTO: 43.1 FL (ref 35.1–46.3)
EOSINOPHIL # BLD AUTO: 0.21 X10(3) UL (ref 0–0.7)
EOSINOPHIL NFR BLD AUTO: 1.5 %
ERYTHROCYTE [DISTWIDTH] IN BLOOD BY AUTOMATED COUNT: 13.2 % (ref 11–15)
GLUCOSE BLD-MCNC: 159 MG/DL (ref 70–99)
HCT VFR BLD AUTO: 40.6 %
HGB BLD-MCNC: 12.9 G/DL
IMM GRANULOCYTES # BLD AUTO: 0.07 X10(3) UL (ref 0–1)
IMM GRANULOCYTES NFR BLD: 0.5 %
LYMPHOCYTES # BLD AUTO: 4.13 X10(3) UL (ref 1–4)
LYMPHOCYTES NFR BLD AUTO: 28.6 %
MCH RBC QN AUTO: 28.2 PG (ref 26–34)
MCHC RBC AUTO-ENTMCNC: 31.8 G/DL (ref 31–37)
MCV RBC AUTO: 88.6 FL
MONOCYTES # BLD AUTO: 1.07 X10(3) UL (ref 0.1–1)
MONOCYTES NFR BLD AUTO: 7.4 %
NEUTROPHILS # BLD AUTO: 8.89 X10 (3) UL (ref 1.5–7.7)
NEUTROPHILS # BLD AUTO: 8.89 X10(3) UL (ref 1.5–7.7)
NEUTROPHILS NFR BLD AUTO: 61.6 %
OSMOLALITY SERPL CALC.SUM OF ELEC: 298 MOSM/KG (ref 275–295)
PLATELET # BLD AUTO: 239 10(3)UL (ref 150–450)
POTASSIUM SERPL-SCNC: 4 MMOL/L (ref 3.5–5.1)
RBC # BLD AUTO: 4.58 X10(6)UL
SODIUM SERPL-SCNC: 141 MMOL/L (ref 136–145)
TROPONIN I HIGH SENSITIVITY: <3 NG/L
WBC # BLD AUTO: 14.4 X10(3) UL (ref 4–11)

## 2022-01-15 PROCEDURE — 93010 ELECTROCARDIOGRAM REPORT: CPT | Performed by: INTERNAL MEDICINE

## 2022-01-15 PROCEDURE — 84484 ASSAY OF TROPONIN QUANT: CPT

## 2022-01-15 PROCEDURE — 85025 COMPLETE CBC W/AUTO DIFF WBC: CPT

## 2022-01-15 PROCEDURE — 80048 BASIC METABOLIC PNL TOTAL CA: CPT

## 2022-01-15 PROCEDURE — 93005 ELECTROCARDIOGRAM TRACING: CPT

## 2022-01-15 PROCEDURE — 99284 EMERGENCY DEPT VISIT MOD MDM: CPT

## 2022-01-15 PROCEDURE — 71045 X-RAY EXAM CHEST 1 VIEW: CPT

## 2022-01-15 PROCEDURE — 36415 COLL VENOUS BLD VENIPUNCTURE: CPT

## 2022-01-15 PROCEDURE — 85379 FIBRIN DEGRADATION QUANT: CPT | Performed by: EMERGENCY MEDICINE

## 2022-01-15 RX ORDER — IBUPROFEN 600 MG/1
600 TABLET ORAL EVERY 8 HOURS PRN
Qty: 30 TABLET | Refills: 0 | Status: SHIPPED | OUTPATIENT
Start: 2022-01-15 | End: 2022-01-22

## 2022-01-15 RX ORDER — CYCLOBENZAPRINE HCL 10 MG
10 TABLET ORAL 3 TIMES DAILY PRN
Qty: 20 TABLET | Refills: 0 | Status: SHIPPED | OUTPATIENT
Start: 2022-01-15 | End: 2022-01-15

## 2022-01-15 RX ORDER — CYCLOBENZAPRINE HCL 10 MG
10 TABLET ORAL 3 TIMES DAILY PRN
Qty: 20 TABLET | Refills: 0 | Status: SHIPPED | OUTPATIENT
Start: 2022-01-15 | End: 2022-01-22

## 2022-01-15 RX ORDER — IBUPROFEN 600 MG/1
600 TABLET ORAL EVERY 8 HOURS PRN
Qty: 30 TABLET | Refills: 0 | Status: SHIPPED | OUTPATIENT
Start: 2022-01-15 | End: 2022-01-15

## 2022-01-16 NOTE — ED INITIAL ASSESSMENT (HPI)
States she was dx with covid on 12/28. Developed left chest pain and worsening SOB over the past few day.

## 2022-01-16 NOTE — ED PROVIDER NOTES
Patient Seen in: [facility] AND [facility] Emergency Department      History   Patient presents with:  Difficulty Breathing    Stated Complaint: shortness of breath    Subjective:   HPI    43-year-old female with history of hyperlipidemia, recent diagnosis of CO breath  Other systems are as noted in HPI. Constitutional and vital signs reviewed. All other systems reviewed and negative except as noted above.     Physical Exam     ED Triage Vitals [01/15/22 1845]   /79   Pulse 88   Resp 18   Temp 99 °F (37 Anion Gap 4 0 - 18 mmol/L    BUN 20 (H) 7 - 18 mg/dL    Creatinine 0.78 0.55 - 1.02 mg/dL    BUN/CREA Ratio 25.6 (H) 10.0 - 20.0    Calcium, Total 9.3 8.5 - 10.1 mg/dL    Calculated Osmolality 298 (H) 275 - 295 mOsm/kg    GFR, Non-African American 82 >= the ED vitals  - afebrile, hemodynamically stable  - I ordered and personally reviewed the labs and imaging and found XR clear, leukocytosis, no anemia, dimer negative, troponin negative, electrolytes reassuring  - supportive care discussed    The patient needed          Medications Prescribed:  Current Discharge Medication List    START taking these medications    !! cyclobenzaprine 10 MG Oral Tab  Take 1 tablet (10 mg total) by mouth 3 (three) times daily as needed for Muscle spasms.   Qty: 20 tablet Refil

## 2022-01-20 ENCOUNTER — OFFICE VISIT (OUTPATIENT)
Dept: FAMILY MEDICINE CLINIC | Facility: CLINIC | Age: 62
End: 2022-01-20
Payer: MEDICAID

## 2022-01-20 VITALS
TEMPERATURE: 98 F | HEIGHT: 65 IN | DIASTOLIC BLOOD PRESSURE: 71 MMHG | SYSTOLIC BLOOD PRESSURE: 119 MMHG | BODY MASS INDEX: 29.55 KG/M2 | HEART RATE: 77 BPM | WEIGHT: 177.38 LBS

## 2022-01-20 DIAGNOSIS — R68.83 CHILLS (WITHOUT FEVER): ICD-10-CM

## 2022-01-20 DIAGNOSIS — L30.9 DERMATITIS: ICD-10-CM

## 2022-01-20 DIAGNOSIS — R51.9 HEADACHE, UNSPECIFIED HEADACHE TYPE: ICD-10-CM

## 2022-01-20 DIAGNOSIS — U07.1 COVID-19 VIRUS INFECTION: ICD-10-CM

## 2022-01-20 DIAGNOSIS — R05.9 COUGH: Primary | ICD-10-CM

## 2022-01-20 PROCEDURE — 99214 OFFICE O/P EST MOD 30 MIN: CPT | Performed by: FAMILY MEDICINE

## 2022-01-20 PROCEDURE — 3008F BODY MASS INDEX DOCD: CPT | Performed by: FAMILY MEDICINE

## 2022-01-20 PROCEDURE — 3078F DIAST BP <80 MM HG: CPT | Performed by: FAMILY MEDICINE

## 2022-01-20 PROCEDURE — 3074F SYST BP LT 130 MM HG: CPT | Performed by: FAMILY MEDICINE

## 2022-01-20 RX ORDER — BENZONATATE 200 MG/1
200 CAPSULE ORAL 3 TIMES DAILY PRN
Qty: 30 CAPSULE | Refills: 0 | Status: SHIPPED | OUTPATIENT
Start: 2022-01-20

## 2022-01-20 RX ORDER — AZITHROMYCIN 250 MG/1
TABLET, FILM COATED ORAL
Qty: 6 TABLET | Refills: 0 | Status: SHIPPED | OUTPATIENT
Start: 2022-01-20 | End: 2022-01-25

## 2022-01-20 NOTE — PROGRESS NOTES
Patient ID: Mitch Bower is a 64year old female. HPI  Patient presents with:  Er F/u: c/o fatigue  Forms Completion    I saw the pt for a telemedicine visit on 1/6/2022. Pt presented to [facility] ED on 1/15/2022; I reviewed the note.  Pt c rash.   Neurological: Positive for headaches.            Medical History:      Past Medical History:   Diagnosis Date   • Hyperlipidemia    •  (normal spontaneous vaginal delivery)     2       Past Surgical History:   Procedure Laterality Date   • APPEN mg total) by mouth daily as needed for Pain.  (Patient not taking: Reported on 1/20/2022) 30 tablet 0     Allergies:No Known Allergies     Physical Exam:       Physical Exam  Blood pressure 119/71, pulse 77, temperature 97.6 °F (36.4 °C), height 5' 5\" (1.6 tablets (500 mg total) by mouth daily for 1 day, THEN 1 tablet (250 mg total) daily for 4 days. Dermatitis  -     DERM - INTERNAL  For the skin lesions I will have her see dermatology .       Referrals (if applicable)  Orders Placed This Encounter      D

## 2022-01-24 ENCOUNTER — TELEPHONE (OUTPATIENT)
Dept: FAMILY MEDICINE CLINIC | Facility: CLINIC | Age: 62
End: 2022-01-24

## 2022-01-24 DIAGNOSIS — J02.9 ACUTE PHARYNGITIS, UNSPECIFIED ETIOLOGY: Primary | ICD-10-CM

## 2022-01-24 NOTE — TELEPHONE ENCOUNTER
Please triage for left ear pain.     ----- Message from Perfecto Thomas sent at 1/24/2022  5:13 PM CST -----  Regarding: ne seymour this his Perfecto Thomas i finish the antibiotics you prescribed to me and today i have my left ears hurt i dint no w

## 2022-01-25 NOTE — TELEPHONE ENCOUNTER
She is already been on Augmentin and then a Z-Tristan. No more antibiotics needed. Should see ENT considering that she continues to have throat pain. They may need to take a look.

## 2022-01-25 NOTE — TELEPHONE ENCOUNTER
Patient saw Dr Adryan Mac on 1/20/2022 and finished the last Z-Tristan today. Patient indicated that most of the symptoms cleared up except still has the left ear pain and pressure that radiates into her throat. Painful to swallow.  Also around the afternoon/evenin

## 2022-08-08 ENCOUNTER — PATIENT MESSAGE (OUTPATIENT)
Dept: FAMILY MEDICINE CLINIC | Facility: CLINIC | Age: 62
End: 2022-08-08

## 2022-08-09 ENCOUNTER — APPOINTMENT (OUTPATIENT)
Dept: CT IMAGING | Facility: HOSPITAL | Age: 62
End: 2022-08-09
Attending: NURSE PRACTITIONER
Payer: MEDICAID

## 2022-08-09 ENCOUNTER — HOSPITAL ENCOUNTER (OUTPATIENT)
Age: 62
Discharge: ANOTHER HOSPITAL | End: 2022-08-09
Payer: MEDICAID

## 2022-08-09 ENCOUNTER — HOSPITAL ENCOUNTER (EMERGENCY)
Facility: HOSPITAL | Age: 62
Discharge: HOME | End: 2022-08-09
Payer: MEDICAID

## 2022-08-09 VITALS
DIASTOLIC BLOOD PRESSURE: 71 MMHG | HEART RATE: 67 BPM | RESPIRATION RATE: 18 BRPM | OXYGEN SATURATION: 99 % | TEMPERATURE: 97 F | SYSTOLIC BLOOD PRESSURE: 122 MMHG

## 2022-08-09 VITALS
SYSTOLIC BLOOD PRESSURE: 130 MMHG | OXYGEN SATURATION: 99 % | RESPIRATION RATE: 20 BRPM | HEART RATE: 72 BPM | DIASTOLIC BLOOD PRESSURE: 68 MMHG | TEMPERATURE: 97 F

## 2022-08-09 DIAGNOSIS — K52.9 ACUTE COLITIS: Primary | ICD-10-CM

## 2022-08-09 DIAGNOSIS — R10.32 ABDOMINAL PAIN, LEFT LOWER QUADRANT: Primary | ICD-10-CM

## 2022-08-09 LAB
ALBUMIN SERPL-MCNC: 3.9 G/DL (ref 3.4–5)
ALP LIVER SERPL-CCNC: 122 U/L
ALT SERPL-CCNC: 20 U/L
ANION GAP SERPL CALC-SCNC: 4 MMOL/L (ref 0–18)
AST SERPL-CCNC: 11 U/L (ref 15–37)
BASOPHILS # BLD AUTO: 0.06 X10(3) UL (ref 0–0.2)
BASOPHILS NFR BLD AUTO: 0.5 %
BILIRUB DIRECT SERPL-MCNC: 0.1 MG/DL (ref 0–0.2)
BILIRUB SERPL-MCNC: 0.4 MG/DL (ref 0.1–2)
BILIRUB UR QL: NEGATIVE
BUN BLD-MCNC: 14 MG/DL (ref 7–18)
BUN/CREAT SERPL: 20.6 (ref 10–20)
CALCIUM BLD-MCNC: 9.8 MG/DL (ref 8.5–10.1)
CHLORIDE SERPL-SCNC: 108 MMOL/L (ref 98–112)
CLARITY UR: CLEAR
CO2 SERPL-SCNC: 29 MMOL/L (ref 21–32)
COLOR UR: YELLOW
CREAT BLD-MCNC: 0.68 MG/DL
DEPRECATED RDW RBC AUTO: 41.8 FL (ref 35.1–46.3)
EOSINOPHIL # BLD AUTO: 0.17 X10(3) UL (ref 0–0.7)
EOSINOPHIL NFR BLD AUTO: 1.5 %
ERYTHROCYTE [DISTWIDTH] IN BLOOD BY AUTOMATED COUNT: 13 % (ref 11–15)
GFR SERPLBLD BASED ON 1.73 SQ M-ARVRAT: 98 ML/MIN/1.73M2 (ref 60–?)
GLUCOSE BLD-MCNC: 96 MG/DL (ref 70–99)
GLUCOSE UR-MCNC: NEGATIVE MG/DL
HCT VFR BLD AUTO: 42.5 %
HGB BLD-MCNC: 13.9 G/DL
IMM GRANULOCYTES # BLD AUTO: 0.03 X10(3) UL (ref 0–1)
IMM GRANULOCYTES NFR BLD: 0.3 %
KETONES UR-MCNC: NEGATIVE MG/DL
LEUKOCYTE ESTERASE UR QL STRIP.AUTO: NEGATIVE
LIPASE SERPL-CCNC: 69 U/L (ref 73–393)
LYMPHOCYTES # BLD AUTO: 3.77 X10(3) UL (ref 1–4)
LYMPHOCYTES NFR BLD AUTO: 33.2 %
MCH RBC QN AUTO: 28.5 PG (ref 26–34)
MCHC RBC AUTO-ENTMCNC: 32.7 G/DL (ref 31–37)
MCV RBC AUTO: 87.1 FL
MONOCYTES # BLD AUTO: 0.81 X10(3) UL (ref 0.1–1)
MONOCYTES NFR BLD AUTO: 7.1 %
NEUTROPHILS # BLD AUTO: 6.5 X10 (3) UL (ref 1.5–7.7)
NEUTROPHILS # BLD AUTO: 6.5 X10(3) UL (ref 1.5–7.7)
NEUTROPHILS NFR BLD AUTO: 57.4 %
NITRITE UR QL STRIP.AUTO: NEGATIVE
OSMOLALITY SERPL CALC.SUM OF ELEC: 292 MOSM/KG (ref 275–295)
PH UR: 5.5 [PH] (ref 5–8)
PLATELET # BLD AUTO: 276 10(3)UL (ref 150–450)
POTASSIUM SERPL-SCNC: 4.2 MMOL/L (ref 3.5–5.1)
PROT SERPL-MCNC: 7.5 G/DL (ref 6.4–8.2)
PROT UR-MCNC: NEGATIVE MG/DL
RBC # BLD AUTO: 4.88 X10(6)UL
SARS-COV-2 RNA RESP QL NAA+PROBE: NOT DETECTED
SODIUM SERPL-SCNC: 141 MMOL/L (ref 136–145)
SP GR UR STRIP: 1.01 (ref 1–1.03)
UROBILINOGEN UR STRIP-ACNC: 0.2
WBC # BLD AUTO: 11.3 X10(3) UL (ref 4–11)

## 2022-08-09 PROCEDURE — 87427 SHIGA-LIKE TOXIN AG IA: CPT | Performed by: NURSE PRACTITIONER

## 2022-08-09 PROCEDURE — 87045 FECES CULTURE AEROBIC BACT: CPT | Performed by: NURSE PRACTITIONER

## 2022-08-09 PROCEDURE — 96361 HYDRATE IV INFUSION ADD-ON: CPT

## 2022-08-09 PROCEDURE — 80076 HEPATIC FUNCTION PANEL: CPT | Performed by: NURSE PRACTITIONER

## 2022-08-09 PROCEDURE — 99205 OFFICE O/P NEW HI 60 MIN: CPT | Performed by: NURSE PRACTITIONER

## 2022-08-09 PROCEDURE — 80048 BASIC METABOLIC PNL TOTAL CA: CPT

## 2022-08-09 PROCEDURE — 99284 EMERGENCY DEPT VISIT MOD MDM: CPT

## 2022-08-09 PROCEDURE — 81001 URINALYSIS AUTO W/SCOPE: CPT

## 2022-08-09 PROCEDURE — 87046 STOOL CULTR AEROBIC BACT EA: CPT | Performed by: NURSE PRACTITIONER

## 2022-08-09 PROCEDURE — 81015 MICROSCOPIC EXAM OF URINE: CPT

## 2022-08-09 PROCEDURE — 96360 HYDRATION IV INFUSION INIT: CPT

## 2022-08-09 PROCEDURE — 85025 COMPLETE CBC W/AUTO DIFF WBC: CPT

## 2022-08-09 PROCEDURE — 74177 CT ABD & PELVIS W/CONTRAST: CPT | Performed by: NURSE PRACTITIONER

## 2022-08-09 PROCEDURE — 83690 ASSAY OF LIPASE: CPT | Performed by: NURSE PRACTITIONER

## 2022-08-09 RX ORDER — TRAMADOL HYDROCHLORIDE 50 MG/1
TABLET ORAL EVERY 6 HOURS PRN
Qty: 10 TABLET | Refills: 0 | Status: SHIPPED | OUTPATIENT
Start: 2022-08-09 | End: 2022-08-14

## 2022-08-09 RX ORDER — ONDANSETRON 4 MG/1
4 TABLET, ORALLY DISINTEGRATING ORAL EVERY 4 HOURS PRN
Qty: 10 TABLET | Refills: 0 | Status: SHIPPED | OUTPATIENT
Start: 2022-08-09 | End: 2022-08-16

## 2022-08-09 NOTE — ED INITIAL ASSESSMENT (HPI)
Pt presents a&o 4/4 c/o diarrhea and LLQ and RLQ pain x 4 days.  + chills States tried OTCs, including imodium, with no relief

## 2022-08-09 NOTE — ED INITIAL ASSESSMENT (HPI)
Pt to ED for RLQ and LLQ abd pain with diarrhea for 3-4 days, she took imodium and reports the diarrhea has slightly improved but abd pain remains present. Pt came from [address] [place] [address] today.

## 2022-08-10 DIAGNOSIS — E78.00 HYPERCHOLESTEREMIA: ICD-10-CM

## 2022-08-11 ENCOUNTER — TELEPHONE (OUTPATIENT)
Dept: CASE MANAGEMENT | Facility: HOSPITAL | Age: 62
End: 2022-08-11

## 2022-08-11 RX ORDER — ATORVASTATIN CALCIUM 20 MG/1
20 TABLET, FILM COATED ORAL NIGHTLY
Qty: 90 TABLET | Refills: 1 | Status: SHIPPED | OUTPATIENT
Start: 2022-08-11

## 2022-08-11 NOTE — PROGRESS NOTES
Patient called stating she was feeling worse. Vomiting all night 8/9/22 and she is still having pain. Patient states she called her PCP but cannot be seen until 9/1/22. Per patient, she could see the APN sooner, but she wants to see the MD.  Per Epic, patient already scheduled an appt with the APN for tomorrow @ 2:45PM.  Patient verbalizes that she is not taking the Zofran or the Tramadol that was prescribed. Patient also states she is not eating or drinking. Advised patient that if she feels she cannot wait to see the APN tomorrow d/t worsening symptoms, then the patient should return to the ED for evaluation. Patient verbalized understanding.

## 2022-08-11 NOTE — TELEPHONE ENCOUNTER
Please review: Protocol failed/No protocol    Requested Prescriptions   Pending Prescriptions Disp Refills    atorvastatin 20 MG Oral Tab 90 tablet 1     Sig: Take 1 tablet (20 mg total) by mouth nightly.         Cholesterol Medication Protocol Failed - 8/10/2022  8:00 PM        Failed - LDL in past 12 months        Failed - Last LDL < 130     Lab Results   Component Value Date    LDL 84 01/11/2020               Passed - ALT in past 12 months        Passed - Last ALT < 80       Lab Results   Component Value Date    ALT 20 08/09/2022             Passed - In person appointment or virtual visit in the past 12 mos or appointment in next 3 mos       Recent Outpatient Visits              6 months ago [facility]    [facility], [address], [address], [place], [place]    Office Visit    7 months ago COVID-19 virus infection    [facility], [address], [address], [place], [place]    Telemedicine    7 months ago Suspected COVID-19 virus infection    [facility]    Nurse Only    7 months ago Suspected COVID-19 virus infection    [facility], [address], [place]    Telemedicine    1 year ago [address] [place] [address], [address], [address], Candice Escalante 14 Outpatient Visits              6 months ago [facility], [address], [address], [place], [place]    Office Visit    7 months ago COVID-19 virus infection    [facility], [address], [address], [place], [place]    Telemedicine    7 months ago Suspected COVID-19 virus infection    [facility]    Nurse Only    7 months ago Suspected COVID-19 virus infection    [facility], [address], [place]    Telemedicine    1 year ago [facility], [address], [address], [place], [place]    Telemedicine

## 2022-08-12 ENCOUNTER — TELEPHONE (OUTPATIENT)
Dept: FAMILY MEDICINE CLINIC | Facility: CLINIC | Age: 62
End: 2022-08-12

## 2022-08-12 NOTE — TELEPHONE ENCOUNTER
I do not see any bp meds in her list, unsure what she is looking for. I can send a short supply until she follows up with Dr Jerona Eisenmenger. I am booked for the rest of the day, unable to see her. Dr Jerona Eisenmenger has a 21  slot on his schedule open for Tuesday.

## 2022-08-12 NOTE — TELEPHONE ENCOUNTER
Dr. Jose Bolivar to add to you schedule Tuesday? [facility] - jung asks:   1. Can you add Virtual Visit today? PRN BP med request, f/u ER CDiff  2. PRN BP Med. If appt today cannot be added, can you send PRN BP? Pharmacy and allergies verified  3. Omeprazole refill - acid reflux control, taking abx, pt request  4. Can you give us permission to double book pt to Dr. Carmona Los Tuesday schedule? Patient calling office, transferred to triage from Call Center. Asking for BP medication, as needed if BP elevated again. Today: /82 HR 81  Yesterday at ER /[facility])        Dx: C. Diff   Discharged with vanco  125mg, one capsule, 4 times a day for 10 days.

## 2022-08-15 NOTE — TELEPHONE ENCOUNTER
Patient states she no longer is going to come to [facility] for treatment as she states she was treated poorly when she was ill. States she is now going to go to  for health issues.

## 2022-09-26 ENCOUNTER — OFFICE VISIT (OUTPATIENT)
Dept: OCCUPATIONAL MEDICINE | Age: 62
End: 2022-09-26
Attending: FAMILY MEDICINE

## 2022-09-26 DIAGNOSIS — Z00.00 ROUTINE GENERAL MEDICAL EXAMINATION AT A HEALTH CARE FACILITY: Primary | ICD-10-CM

## 2022-09-26 PROCEDURE — 86480 TB TEST CELL IMMUN MEASURE: CPT

## 2022-09-28 LAB
M TB IFN-G CD4+ T-CELLS BLD-ACNC: 0.05 IU/ML
M TB TUBERC IFN-G BLD QL: NEGATIVE
M TB TUBERC IGNF/MITOGEN IGNF CONTROL: >10 IU/ML
QFT TB1 AG MINUS NIL: 0 IU/ML
QFT TB2 AG MINUS NIL: 0 IU/ML

## 2022-12-21 ENCOUNTER — HOSPITAL ENCOUNTER (OUTPATIENT)
Age: 62
Discharge: HOME | End: 2022-12-21
Payer: MEDICAID

## 2022-12-21 VITALS
OXYGEN SATURATION: 97 % | DIASTOLIC BLOOD PRESSURE: 56 MMHG | SYSTOLIC BLOOD PRESSURE: 96 MMHG | RESPIRATION RATE: 18 BRPM | TEMPERATURE: 98 F | HEART RATE: 79 BPM

## 2022-12-21 DIAGNOSIS — S39.012A BACK STRAIN, INITIAL ENCOUNTER: Primary | ICD-10-CM

## 2022-12-21 PROCEDURE — 99213 OFFICE O/P EST LOW 20 MIN: CPT | Performed by: NURSE PRACTITIONER

## 2022-12-21 PROCEDURE — 96372 THER/PROPH/DIAG INJ SC/IM: CPT | Performed by: NURSE PRACTITIONER

## 2022-12-21 RX ORDER — CYCLOBENZAPRINE HCL 10 MG
10 TABLET ORAL 3 TIMES DAILY PRN
Qty: 20 TABLET | Refills: 0 | Status: SHIPPED | OUTPATIENT
Start: 2022-12-21 | End: 2022-12-28

## 2022-12-21 RX ORDER — KETOROLAC TROMETHAMINE 30 MG/ML
60 INJECTION, SOLUTION INTRAMUSCULAR; INTRAVENOUS ONCE
Status: COMPLETED | OUTPATIENT
Start: 2022-12-21 | End: 2022-12-21

## 2022-12-21 RX ORDER — METHYLPREDNISOLONE 4 MG/1
TABLET ORAL
Qty: 1 EACH | Refills: 0 | Status: SHIPPED | OUTPATIENT
Start: 2022-12-21

## 2022-12-21 NOTE — DISCHARGE INSTRUCTIONS
Ice to the area of soreness. Gentle stretching exercises. Take the medications as prescribed. Follow-up with your doctor. Return for any concerns.

## 2022-12-21 NOTE — ED INITIAL ASSESSMENT (HPI)
Pt reports lifting heavy case of water on Sunday and has experienced back pain with minimal relief from OTC naproxen, heat packs. No pain medications taken today.

## 2023-03-24 ENCOUNTER — TELEPHONE (OUTPATIENT)
Dept: OPHTHALMOLOGY | Facility: CLINIC | Age: 63
End: 2023-03-24

## 2023-03-24 ENCOUNTER — HOSPITAL ENCOUNTER (OUTPATIENT)
Age: 63
Discharge: HOME | End: 2023-03-24
Payer: MEDICAID

## 2023-03-24 VITALS
SYSTOLIC BLOOD PRESSURE: 140 MMHG | OXYGEN SATURATION: 100 % | RESPIRATION RATE: 10 BRPM | HEART RATE: 92 BPM | DIASTOLIC BLOOD PRESSURE: 74 MMHG | TEMPERATURE: 98 F

## 2023-03-24 DIAGNOSIS — H57.89 IRRITATION OF LEFT EYE: Primary | ICD-10-CM

## 2023-03-24 RX ORDER — TOBRAMYCIN 3 MG/ML
2 SOLUTION/ DROPS OPHTHALMIC EVERY 6 HOURS
Qty: 1 EACH | Refills: 0 | Status: SHIPPED | OUTPATIENT
Start: 2023-03-24 | End: 2023-03-31

## 2023-03-24 NOTE — DISCHARGE INSTRUCTIONS
Avoid rubbing the eye. Use the drops as prescribed. Follow-up with your eye doctor on Monday. Ibuprofen or Tylenol for any discomfort. Return for any concerns.

## 2023-03-24 NOTE — TELEPHONE ENCOUNTER
Patient seen by urgent care and being referred over to be seen within 3 days around 3/27. Patient had an accident had a piece of plastic, given antibiotics and has vision concerns.  Please call at [phone]

## 2023-03-24 NOTE — ED INITIAL ASSESSMENT (HPI)
Pt reports left eye foreign body sensation last night, flushed eye at home.  c/o redness and pain to eye today

## 2023-03-27 NOTE — TELEPHONE ENCOUNTER
Spoke to pt and she states that she is doing much better and does not need to be seen. She declines follow up apt at this time. She will call us if she needs to be seen.

## 2023-08-09 DIAGNOSIS — A60.00 GENITAL HERPES SIMPLEX, UNSPECIFIED SITE: ICD-10-CM

## 2023-08-09 NOTE — TELEPHONE ENCOUNTER
Please review; protocol failed. Last office visit 11/20/2022   Last refill 05/03/2021    Requested Prescriptions   Pending Prescriptions Disp Refills    valACYclovir 500 MG Oral Tab 90 tablet 2     Sig: Take 1 tablet (500 mg total) by mouth daily.        There is no refill protocol information for this order        Recent Outpatient Visits              10 months ago Routine general medical examination at a health care facility    [address] [place] [address] in [address] [place] [address]    1 year ago Cough    [facility], [address], [place],     Office Visit    1 year ago COVID-19 virus infection    [facility], [address], [place] Elly Olszewski, DO    Telemedicine    1 year ago Suspected COVID-19 virus infection    [address], [place], [place]    Nurse Only    1 year ago Suspected COVID-19 virus infection    [facility], [place]    Telemedicine

## 2023-08-10 RX ORDER — VALACYCLOVIR HYDROCHLORIDE 500 MG/1
500 TABLET, FILM COATED ORAL DAILY
Qty: 90 TABLET | Refills: 0 | Status: SHIPPED | OUTPATIENT
Start: 2023-08-10

## 2023-08-12 DIAGNOSIS — E78.00 HYPERCHOLESTEREMIA: ICD-10-CM

## 2023-08-14 RX ORDER — ATORVASTATIN CALCIUM 20 MG/1
20 TABLET, FILM COATED ORAL NIGHTLY
Qty: 90 TABLET | Refills: 0 | OUTPATIENT
Start: 2023-08-14

## 2023-08-14 NOTE — TELEPHONE ENCOUNTER
Refill requested too soon      Disp Refills Start End     atorvastatin 20 MG Oral Tab 90 tablet 0 8/10/2023     Sig - Route: Take 1 tablet (20 mg total) by mouth nightly. No refills until seen in office - Oral    Sent to pharmacy as:  Atorvastatin Calcium 20 MG Oral Tablet (Lipitor)    E-Prescribing Status: Receipt confirmed by pharmacy (8/10/2023 11:28 AM CDT)      [facility]    [address] [place] [address] #[id number] - Nanci CLAYTON [address], [phone], [phone]

## 2023-08-25 ENCOUNTER — OFFICE VISIT (OUTPATIENT)
Dept: FAMILY MEDICINE CLINIC | Facility: CLINIC | Age: 63
End: 2023-08-25

## 2023-08-25 VITALS
HEIGHT: 66 IN | WEIGHT: 176.19 LBS | BODY MASS INDEX: 28.32 KG/M2 | HEART RATE: 89 BPM | TEMPERATURE: 97 F | DIASTOLIC BLOOD PRESSURE: 84 MMHG | SYSTOLIC BLOOD PRESSURE: 129 MMHG

## 2023-08-25 DIAGNOSIS — F51.04 PSYCHOPHYSIOLOGICAL INSOMNIA: ICD-10-CM

## 2023-08-25 DIAGNOSIS — Z12.31 VISIT FOR SCREENING MAMMOGRAM: ICD-10-CM

## 2023-08-25 DIAGNOSIS — E78.2 MIXED HYPERLIPIDEMIA: Primary | ICD-10-CM

## 2023-08-25 DIAGNOSIS — A04.72 C. DIFFICILE COLITIS: ICD-10-CM

## 2023-08-25 DIAGNOSIS — Z12.11 SCREENING FOR COLON CANCER: ICD-10-CM

## 2023-08-25 DIAGNOSIS — F41.9 ANXIETY: ICD-10-CM

## 2023-08-25 DIAGNOSIS — Z12.4 CERVICAL CANCER SCREENING: ICD-10-CM

## 2023-08-25 PROCEDURE — 3079F DIAST BP 80-89 MM HG: CPT | Performed by: FAMILY MEDICINE

## 2023-08-25 PROCEDURE — 3074F SYST BP LT 130 MM HG: CPT | Performed by: FAMILY MEDICINE

## 2023-08-25 PROCEDURE — 3008F BODY MASS INDEX DOCD: CPT | Performed by: FAMILY MEDICINE

## 2023-08-25 PROCEDURE — 99214 OFFICE O/P EST MOD 30 MIN: CPT | Performed by: FAMILY MEDICINE

## 2023-08-25 RX ORDER — TEMAZEPAM 15 MG/1
CAPSULE ORAL
COMMUNITY

## 2023-08-25 RX ORDER — MIRTAZAPINE 15 MG/1
15 TABLET, FILM COATED ORAL NIGHTLY
Qty: 90 TABLET | Refills: 1 | Status: SHIPPED | OUTPATIENT
Start: 2023-08-25 | End: 2024-02-21

## 2023-08-30 ENCOUNTER — OFFICE VISIT (OUTPATIENT)
Dept: OBGYN CLINIC | Facility: CLINIC | Age: 63
End: 2023-08-30

## 2023-08-30 VITALS
WEIGHT: 177 LBS | SYSTOLIC BLOOD PRESSURE: 124 MMHG | BODY MASS INDEX: 28.45 KG/M2 | HEIGHT: 66 IN | DIASTOLIC BLOOD PRESSURE: 71 MMHG

## 2023-08-30 DIAGNOSIS — Z12.4 SCREENING FOR CERVICAL CANCER: Primary | ICD-10-CM

## 2023-08-30 PROCEDURE — 99386 PREV VISIT NEW AGE 40-64: CPT | Performed by: STUDENT IN AN ORGANIZED HEALTH CARE EDUCATION/TRAINING PROGRAM

## 2023-08-30 PROCEDURE — 3074F SYST BP LT 130 MM HG: CPT | Performed by: STUDENT IN AN ORGANIZED HEALTH CARE EDUCATION/TRAINING PROGRAM

## 2023-08-30 PROCEDURE — 3078F DIAST BP <80 MM HG: CPT | Performed by: STUDENT IN AN ORGANIZED HEALTH CARE EDUCATION/TRAINING PROGRAM

## 2023-08-30 PROCEDURE — 3008F BODY MASS INDEX DOCD: CPT | Performed by: STUDENT IN AN ORGANIZED HEALTH CARE EDUCATION/TRAINING PROGRAM

## 2023-08-31 LAB — HPV I/H RISK 1 DNA SPEC QL NAA+PROBE: NEGATIVE

## 2023-09-07 ENCOUNTER — LAB ENCOUNTER (OUTPATIENT)
Dept: LAB | Age: 63
End: 2023-09-07
Attending: FAMILY MEDICINE
Payer: MEDICAID

## 2023-09-07 DIAGNOSIS — E78.2 MIXED HYPERLIPIDEMIA: ICD-10-CM

## 2023-09-07 LAB
ALT SERPL-CCNC: 26 U/L
ANION GAP SERPL CALC-SCNC: 6 MMOL/L (ref 0–18)
AST SERPL-CCNC: 13 U/L (ref 15–37)
BUN BLD-MCNC: 15 MG/DL (ref 7–18)
CALCIUM BLD-MCNC: 9.7 MG/DL (ref 8.5–10.1)
CHLORIDE SERPL-SCNC: 108 MMOL/L (ref 98–112)
CHOLEST SERPL-MCNC: 221 MG/DL (ref ?–200)
CO2 SERPL-SCNC: 27 MMOL/L (ref 21–32)
CREAT BLD-MCNC: 0.77 MG/DL
EGFRCR SERPLBLD CKD-EPI 2021: 87 ML/MIN/1.73M2 (ref 60–?)
FASTING PATIENT LIPID ANSWER: YES
FASTING STATUS PATIENT QL REPORTED: YES
GLUCOSE BLD-MCNC: 108 MG/DL (ref 70–99)
HDLC SERPL-MCNC: 49 MG/DL (ref 40–59)
LDLC SERPL CALC-MCNC: 122 MG/DL (ref ?–100)
NONHDLC SERPL-MCNC: 172 MG/DL (ref ?–130)
OSMOLALITY SERPL CALC.SUM OF ELEC: 293 MOSM/KG (ref 275–295)
POTASSIUM SERPL-SCNC: 4.6 MMOL/L (ref 3.5–5.1)
SODIUM SERPL-SCNC: 141 MMOL/L (ref 136–145)
TRIGL SERPL-MCNC: 284 MG/DL (ref 30–149)
TSI SER-ACNC: 2.01 MIU/ML (ref 0.36–3.74)
VLDLC SERPL CALC-MCNC: 51 MG/DL (ref 0–30)

## 2023-09-07 PROCEDURE — 80061 LIPID PANEL: CPT

## 2023-09-07 PROCEDURE — 84460 ALANINE AMINO (ALT) (SGPT): CPT

## 2023-09-07 PROCEDURE — 84450 TRANSFERASE (AST) (SGOT): CPT

## 2023-09-07 PROCEDURE — 84443 ASSAY THYROID STIM HORMONE: CPT

## 2023-09-07 PROCEDURE — 80048 BASIC METABOLIC PNL TOTAL CA: CPT

## 2023-09-07 PROCEDURE — 36415 COLL VENOUS BLD VENIPUNCTURE: CPT

## 2023-09-09 ENCOUNTER — PATIENT MESSAGE (OUTPATIENT)
Dept: FAMILY MEDICINE CLINIC | Facility: CLINIC | Age: 63
End: 2023-09-09

## 2023-09-09 DIAGNOSIS — E78.00 HYPERCHOLESTEREMIA: Primary | ICD-10-CM

## 2023-09-09 DIAGNOSIS — R73.09 ELEVATED GLUCOSE: ICD-10-CM

## 2023-09-12 RX ORDER — ATORVASTATIN CALCIUM 40 MG/1
40 TABLET, FILM COATED ORAL NIGHTLY
Qty: 90 TABLET | Refills: 0 | Status: SHIPPED | OUTPATIENT
Start: 2023-09-12

## 2023-09-12 RX ORDER — ATORVASTATIN CALCIUM 40 MG/1
40 TABLET, FILM COATED ORAL NIGHTLY
Qty: 90 TABLET | Refills: 0 | COMMUNITY
Start: 2023-09-12 | End: 2023-09-12

## 2023-09-13 PROBLEM — R73.09 ELEVATED GLUCOSE: Status: ACTIVE | Noted: 2023-09-13

## 2023-10-25 ENCOUNTER — HOSPITAL ENCOUNTER (OUTPATIENT)
Age: 63
Discharge: HOME | End: 2023-10-25

## 2023-10-25 ENCOUNTER — APPOINTMENT (OUTPATIENT)
Dept: GENERAL RADIOLOGY | Age: 63
End: 2023-10-25
Attending: NURSE PRACTITIONER

## 2023-10-25 VITALS
TEMPERATURE: 98 F | RESPIRATION RATE: 16 BRPM | HEIGHT: 66 IN | WEIGHT: 175 LBS | DIASTOLIC BLOOD PRESSURE: 70 MMHG | SYSTOLIC BLOOD PRESSURE: 131 MMHG | OXYGEN SATURATION: 98 % | BODY MASS INDEX: 28.13 KG/M2 | HEART RATE: 85 BPM

## 2023-10-25 DIAGNOSIS — J06.9 VIRAL URI: Primary | ICD-10-CM

## 2023-10-25 DIAGNOSIS — R07.89 CHEST HEAVINESS: ICD-10-CM

## 2023-10-25 LAB
#MXD IC: 0.8 X10ˆ3/UL (ref 0.1–1)
ATRIAL RATE: 80 BPM
BUN BLD-MCNC: 13 MG/DL (ref 7–18)
CHLORIDE BLD-SCNC: 103 MMOL/L (ref 98–112)
CO2 BLD-SCNC: 27 MMOL/L (ref 21–32)
CREAT BLD-MCNC: 0.7 MG/DL
DDIMER WHOLE BLOOD: <200 NG/ML DDU (ref ?–400)
EGFRCR SERPLBLD CKD-EPI 2021: 97 ML/MIN/1.73M2 (ref 60–?)
GLUCOSE BLD-MCNC: 111 MG/DL (ref 70–99)
HCT VFR BLD AUTO: 41.8 %
HCT VFR BLD CALC: 44 %
HGB BLD-MCNC: 13.8 G/DL
ISTAT IONIZED CALCIUM FOR CHEM 8: 1.24 MMOL/L (ref 1.12–1.32)
LYMPHOCYTES # BLD AUTO: 3.1 X10ˆ3/UL (ref 1–4)
LYMPHOCYTES NFR BLD AUTO: 31.3 %
MCH RBC QN AUTO: 28.8 PG (ref 26–34)
MCHC RBC AUTO-ENTMCNC: 33 G/DL (ref 31–37)
MCV RBC AUTO: 87.3 FL (ref 80–100)
MIXED CELL %: 8 %
NEUTROPHILS # BLD AUTO: 6 X10ˆ3/UL (ref 1.5–7.7)
NEUTROPHILS NFR BLD AUTO: 60.7 %
P AXIS: 64 DEGREES
P-R INTERVAL: 168 MS
PLATELET # BLD AUTO: 233 X10ˆ3/UL (ref 150–450)
POTASSIUM BLD-SCNC: 4.6 MMOL/L (ref 3.6–5.1)
Q-T INTERVAL: 374 MS
QRS DURATION: 70 MS
QTC CALCULATION (BEZET): 431 MS
R AXIS: 9 DEGREES
RBC # BLD AUTO: 4.79 X10ˆ6/UL
SARS-COV-2 RNA RESP QL NAA+PROBE: NOT DETECTED
SODIUM BLD-SCNC: 141 MMOL/L (ref 136–145)
T AXIS: 16 DEGREES
TROPONIN I BLD-MCNC: <0.02 NG/ML
VENTRICULAR RATE: 80 BPM
WBC # BLD AUTO: 9.9 X10ˆ3/UL (ref 4–11)

## 2023-10-25 PROCEDURE — 99213 OFFICE O/P EST LOW 20 MIN: CPT | Performed by: NURSE PRACTITIONER

## 2023-10-25 PROCEDURE — U0002 COVID-19 LAB TEST NON-CDC: HCPCS | Performed by: NURSE PRACTITIONER

## 2023-10-25 PROCEDURE — 80047 BASIC METABLC PNL IONIZED CA: CPT | Performed by: NURSE PRACTITIONER

## 2023-10-25 PROCEDURE — 93000 ELECTROCARDIOGRAM COMPLETE: CPT | Performed by: NURSE PRACTITIONER

## 2023-10-25 PROCEDURE — 71046 X-RAY EXAM CHEST 2 VIEWS: CPT | Performed by: NURSE PRACTITIONER

## 2023-10-25 PROCEDURE — 84484 ASSAY OF TROPONIN QUANT: CPT | Performed by: NURSE PRACTITIONER

## 2023-10-25 PROCEDURE — 85025 COMPLETE CBC W/AUTO DIFF WBC: CPT | Performed by: NURSE PRACTITIONER

## 2023-10-25 RX ORDER — BENZONATATE 200 MG/1
200 CAPSULE ORAL 3 TIMES DAILY PRN
Qty: 30 CAPSULE | Refills: 0 | Status: SHIPPED | OUTPATIENT
Start: 2023-10-25

## 2023-10-25 RX ORDER — CODEINE PHOSPHATE AND GUAIFENESIN 10; 100 MG/5ML; MG/5ML
5 SOLUTION ORAL NIGHTLY PRN
Qty: 118 ML | Refills: 0 | Status: SHIPPED | OUTPATIENT
Start: 2023-10-25

## 2023-10-25 NOTE — DISCHARGE INSTRUCTIONS
Tessalon Perles 1 tablet every 8 hours as needed for cough  Cheratussin 5 ml at night only   Please drink plenty of fluids  Steam showers  Take ibuprofen (Motrin, Advil) 600 mg every 6 hours for fever/aches, take with food  OR  Acetaminophen (Tylenol) 650-1000 mg every 6 hours for fever/aches  Rest!  Mucinex DM twice per day for 7 days, with plenty of fluids  For chest pain, shortness of breath or worsening symptoms, please go to ER  Please see your primary care provider if no improvement in 5-7 days

## 2023-10-25 NOTE — ED INITIAL ASSESSMENT (HPI)
C/o cough x2 days mid sternal chest heaviness and sore throat.  Taking tessalon pearls and not helping

## 2023-10-27 ENCOUNTER — HOSPITAL ENCOUNTER (EMERGENCY)
Facility: HOSPITAL | Age: 63
Discharge: HOME | End: 2023-10-28
Attending: EMERGENCY MEDICINE
Payer: MEDICAID

## 2023-10-27 ENCOUNTER — APPOINTMENT (OUTPATIENT)
Dept: CT IMAGING | Facility: HOSPITAL | Age: 63
End: 2023-10-27
Attending: EMERGENCY MEDICINE
Payer: MEDICAID

## 2023-10-27 DIAGNOSIS — R11.2 NAUSEA VOMITING AND DIARRHEA: Primary | ICD-10-CM

## 2023-10-27 DIAGNOSIS — R19.7 NAUSEA VOMITING AND DIARRHEA: Primary | ICD-10-CM

## 2023-10-27 DIAGNOSIS — J18.0 BRONCHOPNEUMONIA: ICD-10-CM

## 2023-10-27 LAB
ALBUMIN SERPL-MCNC: 4.5 G/DL (ref 3.4–5)
ALBUMIN/GLOB SERPL: 1.1 {RATIO} (ref 1–2)
ALP LIVER SERPL-CCNC: 140 U/L
ALT SERPL-CCNC: 28 U/L
ANION GAP SERPL CALC-SCNC: 4 MMOL/L (ref 0–18)
AST SERPL-CCNC: 23 U/L (ref 15–37)
BASOPHILS # BLD AUTO: 0.07 X10(3) UL (ref 0–0.2)
BASOPHILS NFR BLD AUTO: 0.6 %
BILIRUB SERPL-MCNC: 0.4 MG/DL (ref 0.1–2)
BILIRUB UR QL: NEGATIVE
BUN BLD-MCNC: 12 MG/DL (ref 7–18)
BUN/CREAT SERPL: 15.6 (ref 10–20)
CALCIUM BLD-MCNC: 10 MG/DL (ref 8.5–10.1)
CHLORIDE SERPL-SCNC: 109 MMOL/L (ref 98–112)
CLARITY UR: CLEAR
CO2 SERPL-SCNC: 26 MMOL/L (ref 21–32)
COLOR UR: COLORLESS
CREAT BLD-MCNC: 0.77 MG/DL
DEPRECATED RDW RBC AUTO: 39.6 FL (ref 35.1–46.3)
EGFRCR SERPLBLD CKD-EPI 2021: 87 ML/MIN/1.73M2 (ref 60–?)
EOSINOPHIL # BLD AUTO: 0.06 X10(3) UL (ref 0–0.7)
EOSINOPHIL NFR BLD AUTO: 0.5 %
ERYTHROCYTE [DISTWIDTH] IN BLOOD BY AUTOMATED COUNT: 12.9 % (ref 11–15)
FLUAV + FLUBV RNA SPEC NAA+PROBE: NEGATIVE
FLUAV + FLUBV RNA SPEC NAA+PROBE: NEGATIVE
GLOBULIN PLAS-MCNC: 4 G/DL (ref 2.8–4.4)
GLUCOSE BLD-MCNC: 123 MG/DL (ref 70–99)
GLUCOSE UR-MCNC: 30 MG/DL
HCT VFR BLD AUTO: 44.1 %
HGB BLD-MCNC: 15.2 G/DL
IMM GRANULOCYTES # BLD AUTO: 0.04 X10(3) UL (ref 0–1)
IMM GRANULOCYTES NFR BLD: 0.4 %
LEUKOCYTE ESTERASE UR QL STRIP.AUTO: NEGATIVE
LIPASE SERPL-CCNC: 28 U/L (ref 13–75)
LYMPHOCYTES # BLD AUTO: 2.38 X10(3) UL (ref 1–4)
LYMPHOCYTES NFR BLD AUTO: 21.4 %
MCH RBC QN AUTO: 29 PG (ref 26–34)
MCHC RBC AUTO-ENTMCNC: 34.5 G/DL (ref 31–37)
MCV RBC AUTO: 84.2 FL
MONOCYTES # BLD AUTO: 0.83 X10(3) UL (ref 0.1–1)
MONOCYTES NFR BLD AUTO: 7.5 %
NEUTROPHILS # BLD AUTO: 7.74 X10 (3) UL (ref 1.5–7.7)
NEUTROPHILS # BLD AUTO: 7.74 X10(3) UL (ref 1.5–7.7)
NEUTROPHILS NFR BLD AUTO: 69.6 %
NITRITE UR QL STRIP.AUTO: NEGATIVE
OSMOLALITY SERPL CALC.SUM OF ELEC: 289 MOSM/KG (ref 275–295)
PH UR: 6.5 [PH] (ref 5–8)
PLATELET # BLD AUTO: 261 10(3)UL (ref 150–450)
POTASSIUM SERPL-SCNC: 3.8 MMOL/L (ref 3.5–5.1)
PROT SERPL-MCNC: 8.5 G/DL (ref 6.4–8.2)
PROT UR-MCNC: NEGATIVE MG/DL
RBC # BLD AUTO: 5.24 X10(6)UL
RSV RNA SPEC NAA+PROBE: NEGATIVE
SARS-COV-2 RNA RESP QL NAA+PROBE: NOT DETECTED
SODIUM SERPL-SCNC: 139 MMOL/L (ref 136–145)
SP GR UR STRIP: 1.01 (ref 1–1.03)
UROBILINOGEN UR STRIP-ACNC: NORMAL
WBC # BLD AUTO: 11.1 X10(3) UL (ref 4–11)

## 2023-10-27 PROCEDURE — 81001 URINALYSIS AUTO W/SCOPE: CPT | Performed by: EMERGENCY MEDICINE

## 2023-10-27 PROCEDURE — 96375 TX/PRO/DX INJ NEW DRUG ADDON: CPT

## 2023-10-27 PROCEDURE — 85025 COMPLETE CBC W/AUTO DIFF WBC: CPT | Performed by: EMERGENCY MEDICINE

## 2023-10-27 PROCEDURE — 74177 CT ABD & PELVIS W/CONTRAST: CPT | Performed by: EMERGENCY MEDICINE

## 2023-10-27 PROCEDURE — 83690 ASSAY OF LIPASE: CPT | Performed by: EMERGENCY MEDICINE

## 2023-10-27 PROCEDURE — 99285 EMERGENCY DEPT VISIT HI MDM: CPT

## 2023-10-27 PROCEDURE — 80053 COMPREHEN METABOLIC PANEL: CPT | Performed by: EMERGENCY MEDICINE

## 2023-10-27 PROCEDURE — 96374 THER/PROPH/DIAG INJ IV PUSH: CPT

## 2023-10-27 PROCEDURE — 0241U SARS-COV-2/FLU A AND B/RSV BY PCR (GENEXPERT): CPT | Performed by: EMERGENCY MEDICINE

## 2023-10-27 PROCEDURE — 96361 HYDRATE IV INFUSION ADD-ON: CPT

## 2023-10-27 RX ORDER — DIPHENHYDRAMINE HYDROCHLORIDE 50 MG/ML
25 INJECTION INTRAMUSCULAR; INTRAVENOUS ONCE
Status: COMPLETED | OUTPATIENT
Start: 2023-10-27 | End: 2023-10-27

## 2023-10-27 RX ORDER — ONDANSETRON 2 MG/ML
4 INJECTION INTRAMUSCULAR; INTRAVENOUS ONCE
Status: COMPLETED | OUTPATIENT
Start: 2023-10-27 | End: 2023-10-27

## 2023-10-27 RX ORDER — PROCHLORPERAZINE EDISYLATE 5 MG/ML
10 INJECTION INTRAMUSCULAR; INTRAVENOUS ONCE
Status: COMPLETED | OUTPATIENT
Start: 2023-10-27 | End: 2023-10-27

## 2023-10-27 RX ORDER — METOCLOPRAMIDE HYDROCHLORIDE 5 MG/ML
10 INJECTION INTRAMUSCULAR; INTRAVENOUS ONCE
Status: COMPLETED | OUTPATIENT
Start: 2023-10-27 | End: 2023-10-27

## 2023-10-27 RX ORDER — IOHEXOL 350 MG/ML
85 INJECTION, SOLUTION INTRAVENOUS
Status: COMPLETED | OUTPATIENT
Start: 2023-10-27 | End: 2023-10-27

## 2023-10-27 RX ORDER — DICYCLOMINE HCL 20 MG
20 TABLET ORAL ONCE
Status: COMPLETED | OUTPATIENT
Start: 2023-10-27 | End: 2023-10-27

## 2023-10-28 VITALS
TEMPERATURE: 98 F | SYSTOLIC BLOOD PRESSURE: 107 MMHG | WEIGHT: 175 LBS | HEIGHT: 66 IN | OXYGEN SATURATION: 94 % | HEART RATE: 73 BPM | RESPIRATION RATE: 20 BRPM | DIASTOLIC BLOOD PRESSURE: 62 MMHG | BODY MASS INDEX: 28.13 KG/M2

## 2023-10-28 RX ORDER — DOXYCYCLINE HYCLATE 100 MG/1
100 CAPSULE ORAL 2 TIMES DAILY
Qty: 14 CAPSULE | Refills: 0 | Status: SHIPPED | OUTPATIENT
Start: 2023-10-28 | End: 2023-11-04

## 2023-10-28 RX ORDER — VANCOMYCIN HYDROCHLORIDE 125 MG/1
125 CAPSULE ORAL ONCE
Status: COMPLETED | OUTPATIENT
Start: 2023-10-28 | End: 2023-10-28

## 2023-10-28 RX ORDER — DOXYCYCLINE HYCLATE 100 MG/1
100 CAPSULE ORAL ONCE
Status: COMPLETED | OUTPATIENT
Start: 2023-10-28 | End: 2023-10-28

## 2023-10-28 RX ORDER — DICYCLOMINE HCL 20 MG
20 TABLET ORAL 4 TIMES DAILY PRN
Qty: 28 TABLET | Refills: 0 | Status: SHIPPED | OUTPATIENT
Start: 2023-10-28 | End: 2023-11-04

## 2023-10-28 RX ORDER — VANCOMYCIN HYDROCHLORIDE 125 MG/1
125 CAPSULE ORAL 2 TIMES DAILY
Qty: 20 CAPSULE | Refills: 0 | Status: SHIPPED | OUTPATIENT
Start: 2023-10-28 | End: 2023-11-07

## 2023-10-28 RX ORDER — ONDANSETRON 4 MG/1
4 TABLET, ORALLY DISINTEGRATING ORAL EVERY 8 HOURS PRN
Qty: 15 TABLET | Refills: 0 | Status: SHIPPED | OUTPATIENT
Start: 2023-10-28 | End: 2023-11-02

## 2023-10-28 NOTE — ED QUICK NOTES
Discharge instructions given to patient. Patient states understanding. Patient alert and oriented leaving with family. Instructed patient to follow up with MD listed on discharge papers, or return to ED if symptoms worsen. Primary Care/Behavioral Health Integration - Program Introduction    Patient referred to behavioral health by her primary care provider, Quinn Membreno MD, for concerns related to stress.  Third attempt to contact patient to discuss the integration of behavioral health services into the primary care clinic and the provision of care coordination and/or short-term, brief interventions to improve overall health and functioning.  Left a brief message with contact information and requested a callback at her earliest convenience.

## 2023-10-28 NOTE — ED INITIAL ASSESSMENT (HPI)
Patient arrived via EMS, c/c n/v/d for past 18 hours, patient seen at [address] [place] [address] on Wednesday for cough and headache. +headache.  Denies hemoptysis

## 2023-10-28 NOTE — ED QUICK NOTES
Patient states she has a headache and the chills. Provided blanket and administered medication. Patient states last year had c-diff and this episode of diarrhea and vomiting has her concerned of same condition.

## 2023-11-01 NOTE — LETTER
Date & Time: 10/25/2023, 2:42 PM  Patient: Chantel Stone  Encounter Provider(s):    CARLENE Worthington       To Whom It May Concern:    Chantel Stone was seen and treated in our department on 10/25/2023. Please excuse her from work today and tomorrow 10/26/2023. She may return on 10/27/2023. If you have any questions or concerns, please do not hesitate to call.       FAIZA Connolly  _____________________________  [employer] Give her a call or send her a message telling her that I am going to restart her on a small dose of amlodipine 2.5 mg a day, and see how her blood pressure does.  [facility]    Schedule her for clinic visit in the next month or so, and remind her to bring her home blood pressure machine with her on that visit as well.

## 2023-11-20 DIAGNOSIS — F51.04 PSYCHOPHYSIOLOGICAL INSOMNIA: ICD-10-CM

## 2023-11-20 DIAGNOSIS — F41.9 ANXIETY: ICD-10-CM

## 2023-11-21 RX ORDER — MIRTAZAPINE 15 MG/1
15 TABLET, FILM COATED ORAL NIGHTLY
Qty: 90 TABLET | Refills: 0 | Status: SHIPPED | OUTPATIENT
Start: 2023-11-21

## 2023-11-21 NOTE — TELEPHONE ENCOUNTER
Please review; protocol failed.    Requested Prescriptions   Pending Prescriptions Disp Refills    MIRTAZAPINE 15 MG Oral Tab [Pharmacy Med Name: MIRTAZAPINE 15MG TABLETS] 90 tablet 1     Sig: TAKE 1 TABLET(15 MG) BY MOUTH EVERY NIGHT FOR MOOD OR SLEEP       There is no refill protocol information for this order        Recent Outpatient Visits              2 months ago Screening for cervical cancer    [address] [facility], [place] - OB/GYN Emmanuel Broderick PA-C    Office Visit    2 months ago Mixed hyperlipidemia    [address] [facility], [address], [place],     Office Visit    1 year ago Routine general medical examination at a health care facility    [address] [place] [address] in [address] [place] [address]    1 year ago Cough    [address], [address], [place] DO    Office Visit    1 year ago COVID-19 virus infection    [facility], [address], [place], [place]    Telemedicine          Future Appointments         Provider Department Appt Notes    In 1 week COREY WEI RM1; COREY DENIA [facility]

## 2023-12-01 ENCOUNTER — LAB ENCOUNTER (OUTPATIENT)
Dept: LAB | Age: 63
End: 2023-12-01
Attending: FAMILY MEDICINE
Payer: MEDICAID

## 2023-12-01 ENCOUNTER — HOSPITAL ENCOUNTER (OUTPATIENT)
Dept: MAMMOGRAPHY | Age: 63
Discharge: HOME | End: 2023-12-01
Attending: FAMILY MEDICINE
Payer: MEDICAID

## 2023-12-01 DIAGNOSIS — Z12.31 VISIT FOR SCREENING MAMMOGRAM: ICD-10-CM

## 2023-12-01 DIAGNOSIS — R73.09 ELEVATED GLUCOSE: ICD-10-CM

## 2023-12-01 DIAGNOSIS — E78.00 HYPERCHOLESTEREMIA: ICD-10-CM

## 2023-12-01 LAB
ALBUMIN SERPL-MCNC: 4.7 G/DL (ref 3.2–4.8)
ALBUMIN/GLOB SERPL: 1.9 {RATIO} (ref 1–2)
ALP LIVER SERPL-CCNC: 137 U/L
ALT SERPL-CCNC: 35 U/L
ANION GAP SERPL CALC-SCNC: 8 MMOL/L (ref 0–18)
AST SERPL-CCNC: 26 U/L (ref ?–34)
BILIRUB SERPL-MCNC: 0.3 MG/DL (ref 0.2–1.1)
BUN BLD-MCNC: 15 MG/DL (ref 9–23)
BUN/CREAT SERPL: 18.8 (ref 10–20)
CALCIUM BLD-MCNC: 10.1 MG/DL (ref 8.7–10.4)
CHLORIDE SERPL-SCNC: 105 MMOL/L (ref 98–112)
CHOLEST SERPL-MCNC: 261 MG/DL (ref ?–200)
CO2 SERPL-SCNC: 28 MMOL/L (ref 21–32)
CREAT BLD-MCNC: 0.8 MG/DL
EGFRCR SERPLBLD CKD-EPI 2021: 83 ML/MIN/1.73M2 (ref 60–?)
EST. AVERAGE GLUCOSE BLD GHB EST-MCNC: 137 MG/DL (ref 68–126)
FASTING PATIENT LIPID ANSWER: NO
FASTING STATUS PATIENT QL REPORTED: NO
GLOBULIN PLAS-MCNC: 2.5 G/DL (ref 2.8–4.4)
GLUCOSE BLD-MCNC: 105 MG/DL (ref 70–99)
HBA1C MFR BLD: 6.4 % (ref ?–5.7)
HDLC SERPL-MCNC: 55 MG/DL (ref 40–59)
LDLC SERPL CALC-MCNC: 119 MG/DL (ref ?–100)
NONHDLC SERPL-MCNC: 206 MG/DL (ref ?–130)
OSMOLALITY SERPL CALC.SUM OF ELEC: 293 MOSM/KG (ref 275–295)
POTASSIUM SERPL-SCNC: 4.2 MMOL/L (ref 3.5–5.1)
PROT SERPL-MCNC: 7.2 G/DL (ref 5.7–8.2)
SODIUM SERPL-SCNC: 141 MMOL/L (ref 136–145)
TRIGL SERPL-MCNC: 493 MG/DL (ref 30–149)
VLDLC SERPL CALC-MCNC: 90 MG/DL (ref 0–30)

## 2023-12-01 PROCEDURE — 80061 LIPID PANEL: CPT

## 2023-12-01 PROCEDURE — 83036 HEMOGLOBIN GLYCOSYLATED A1C: CPT

## 2023-12-01 PROCEDURE — 77063 BREAST TOMOSYNTHESIS BI: CPT | Performed by: FAMILY MEDICINE

## 2023-12-01 PROCEDURE — 36415 COLL VENOUS BLD VENIPUNCTURE: CPT

## 2023-12-01 PROCEDURE — 80053 COMPREHEN METABOLIC PANEL: CPT

## 2023-12-01 PROCEDURE — 77067 SCR MAMMO BI INCL CAD: CPT | Performed by: FAMILY MEDICINE

## 2023-12-07 RX ORDER — ATORVASTATIN CALCIUM 40 MG/1
40 TABLET, FILM COATED ORAL NIGHTLY
Qty: 90 TABLET | Refills: 0 | Status: SHIPPED | OUTPATIENT
Start: 2023-12-07

## 2023-12-07 NOTE — TELEPHONE ENCOUNTER
Ailyn Hollingsworth - message sent to patient, that appointment needed     But - until then, do you want to send a temporary supply atorvastatin?

## 2023-12-07 NOTE — TELEPHONE ENCOUNTER
Refill passed per [facility] protocol. However per 9/9/23 MyChart encounter, recheck lipids in 3 months. And per 12/1/23 Lipid results:          Result Care Coordination      Result Notes     Farheen Torres, Laura Short  12/6/2023  7:00 PM CST Back to [facility], for pt to call back. Mary Kearneykeisha, CARLENE  12/3/2023  8:25 PM CST       Pt needs appointment to discuss labs. No future appointments. Requested Prescriptions   Pending Prescriptions Disp Refills    atorvastatin 40 MG Oral Tab 90 tablet 0     Sig: Take 1 tablet (40 mg total) by mouth nightly.        Cholesterol Medication Protocol Passed - 12/6/2023 12:28 PM        Passed - ALT in past 12 months        Passed - LDL in past 12 months        Passed - Last ALT < 80     Lab Results   Component Value Date    ALT 35 12/01/2023             Passed - Last LDL < 130     Lab Results   Component Value Date     (H) 12/01/2023             Passed - In person appointment or virtual visit in the past 12 mos or appointment in next 3 mos     Recent Outpatient Visits              3 months ago Screening for cervical cancer    [address] [facility], [address], [facility]    Office Visit    3 months ago Mixed hyperlipidemia    [facility] [address] [facility], [address], [place], [place]    Office Visit    1 year ago Routine general medical examination at a health care facility    [address] [place] [address] in [address] [place] [address]    1 year ago Cough    [address] [facility], [address], [place], [place]    Office Visit    1 year ago COVID-19 virus infection    [address], [address]              3 months ago Screening for cervical cancer    [address], [place] - OB/GYN Loly Gayle PA-C    Office Visit    3 months ago Mixed hyperlipidemia    [facility], [address], [address], [place], [place]    Office Visit    1 year ago Routine general medical examination at a health care facility    [address] [place] [address] in [address] [place] [address]    1 year ago Cough    [address] [facility], [address], [place], [place]    Office Visit    1 year ago COVID-19 virus infection    [facility], [address], [place], [place]    Telemedicine

## 2023-12-18 ENCOUNTER — OFFICE VISIT (OUTPATIENT)
Dept: FAMILY MEDICINE CLINIC | Facility: CLINIC | Age: 63
End: 2023-12-18
Payer: MEDICAID

## 2023-12-18 VITALS
HEART RATE: 91 BPM | TEMPERATURE: 98 F | DIASTOLIC BLOOD PRESSURE: 75 MMHG | BODY MASS INDEX: 28.93 KG/M2 | SYSTOLIC BLOOD PRESSURE: 118 MMHG | HEIGHT: 66 IN | WEIGHT: 180 LBS

## 2023-12-18 DIAGNOSIS — F51.04 PSYCHOPHYSIOLOGICAL INSOMNIA: ICD-10-CM

## 2023-12-18 DIAGNOSIS — Z72.0 TOBACCO USE: Chronic | ICD-10-CM

## 2023-12-18 DIAGNOSIS — H69.92 ETD (EUSTACHIAN TUBE DYSFUNCTION), LEFT: ICD-10-CM

## 2023-12-18 DIAGNOSIS — Z23 NEED FOR VACCINATION: ICD-10-CM

## 2023-12-18 DIAGNOSIS — F41.9 ANXIETY: ICD-10-CM

## 2023-12-18 DIAGNOSIS — E78.2 MIXED HYPERLIPIDEMIA: Primary | ICD-10-CM

## 2023-12-18 DIAGNOSIS — E66.3 OVERWEIGHT (BMI 25.0-29.9): ICD-10-CM

## 2023-12-18 DIAGNOSIS — J30.89 OTHER ALLERGIC RHINITIS: ICD-10-CM

## 2023-12-18 DIAGNOSIS — R73.09 ELEVATED GLUCOSE: ICD-10-CM

## 2023-12-18 DIAGNOSIS — R91.1 PULMONARY NODULE, RIGHT: Chronic | ICD-10-CM

## 2023-12-18 PROCEDURE — 90471 IMMUNIZATION ADMIN: CPT | Performed by: FAMILY MEDICINE

## 2023-12-18 PROCEDURE — 99215 OFFICE O/P EST HI 40 MIN: CPT | Performed by: FAMILY MEDICINE

## 2023-12-18 PROCEDURE — 3078F DIAST BP <80 MM HG: CPT | Performed by: FAMILY MEDICINE

## 2023-12-18 PROCEDURE — 90686 IIV4 VACC NO PRSV 0.5 ML IM: CPT | Performed by: FAMILY MEDICINE

## 2023-12-18 PROCEDURE — 3008F BODY MASS INDEX DOCD: CPT | Performed by: FAMILY MEDICINE

## 2023-12-18 PROCEDURE — 90677 PCV20 VACCINE IM: CPT | Performed by: FAMILY MEDICINE

## 2023-12-18 PROCEDURE — 90472 IMMUNIZATION ADMIN EACH ADD: CPT | Performed by: FAMILY MEDICINE

## 2023-12-18 PROCEDURE — 3074F SYST BP LT 130 MM HG: CPT | Performed by: FAMILY MEDICINE

## 2023-12-18 RX ORDER — NORTRIPTYLINE HYDROCHLORIDE 10 MG/1
10 CAPSULE ORAL NIGHTLY
Qty: 60 CAPSULE | Refills: 2 | Status: SHIPPED | OUTPATIENT
Start: 2023-12-18

## 2023-12-18 RX ORDER — AZELASTINE 1 MG/ML
2 SPRAY, METERED NASAL 2 TIMES DAILY
Qty: 3 EACH | Refills: 1 | Status: SHIPPED | OUTPATIENT
Start: 2023-12-18

## 2023-12-18 RX ORDER — MONTELUKAST SODIUM 10 MG/1
10 TABLET ORAL NIGHTLY
Qty: 90 TABLET | Refills: 1 | Status: SHIPPED | OUTPATIENT
Start: 2023-12-18 | End: 2024-06-15

## 2024-02-03 ENCOUNTER — HOSPITAL ENCOUNTER (OUTPATIENT)
Dept: CT IMAGING | Age: 64
Discharge: HOME | End: 2024-02-03
Attending: FAMILY MEDICINE
Payer: MEDICAID

## 2024-02-03 DIAGNOSIS — R91.1 PULMONARY NODULE, RIGHT: Chronic | ICD-10-CM

## 2024-02-03 PROCEDURE — 71250 CT THORAX DX C-: CPT | Performed by: FAMILY MEDICINE

## 2024-03-05 RX ORDER — ATORVASTATIN CALCIUM 40 MG/1
40 TABLET, FILM COATED ORAL NIGHTLY
Qty: 90 TABLET | Refills: 3 | Status: SHIPPED | OUTPATIENT
Start: 2024-03-05 | End: 2024-04-25

## 2024-04-14 DIAGNOSIS — A60.00 GENITAL HERPES SIMPLEX, UNSPECIFIED SITE: ICD-10-CM

## 2024-04-15 ENCOUNTER — LAB ENCOUNTER (OUTPATIENT)
Dept: LAB | Age: 64
End: 2024-04-15
Attending: FAMILY MEDICINE
Payer: MEDICAID

## 2024-04-15 DIAGNOSIS — R19.7 NAUSEA VOMITING AND DIARRHEA: ICD-10-CM

## 2024-04-15 DIAGNOSIS — R73.09 ELEVATED GLUCOSE: ICD-10-CM

## 2024-04-15 DIAGNOSIS — E78.2 MIXED HYPERLIPIDEMIA: ICD-10-CM

## 2024-04-15 DIAGNOSIS — R11.2 NAUSEA VOMITING AND DIARRHEA: ICD-10-CM

## 2024-04-15 LAB
ALT SERPL-CCNC: 28 U/L
ANION GAP SERPL CALC-SCNC: 6 MMOL/L (ref 0–18)
AST SERPL-CCNC: 20 U/L (ref ?–34)
BUN BLD-MCNC: 17 MG/DL (ref 9–23)
BUN/CREAT SERPL: 21.5 (ref 10–20)
C DIFF TOX B STL QL: NEGATIVE
CALCIUM BLD-MCNC: 10.3 MG/DL (ref 8.7–10.4)
CHLORIDE SERPL-SCNC: 106 MMOL/L (ref 98–112)
CHOLEST SERPL-MCNC: 212 MG/DL (ref ?–200)
CO2 SERPL-SCNC: 27 MMOL/L (ref 21–32)
CREAT BLD-MCNC: 0.79 MG/DL
EGFRCR SERPLBLD CKD-EPI 2021: 84 ML/MIN/1.73M2 (ref 60–?)
EST. AVERAGE GLUCOSE BLD GHB EST-MCNC: 146 MG/DL (ref 68–126)
FASTING PATIENT LIPID ANSWER: YES
FASTING STATUS PATIENT QL REPORTED: YES
GLUCOSE BLD-MCNC: 119 MG/DL (ref 70–99)
HBA1C MFR BLD: 6.7 % (ref ?–5.7)
HDLC SERPL-MCNC: 51 MG/DL (ref 40–59)
LDLC SERPL CALC-MCNC: 123 MG/DL (ref ?–100)
NONHDLC SERPL-MCNC: 161 MG/DL (ref ?–130)
OSMOLALITY SERPL CALC.SUM OF ELEC: 291 MOSM/KG (ref 275–295)
POTASSIUM SERPL-SCNC: 4.8 MMOL/L (ref 3.5–5.1)
SODIUM SERPL-SCNC: 139 MMOL/L (ref 136–145)
TRIGL SERPL-MCNC: 218 MG/DL (ref 30–149)
VLDLC SERPL CALC-MCNC: 39 MG/DL (ref 0–30)

## 2024-04-15 PROCEDURE — 84450 TRANSFERASE (AST) (SGOT): CPT

## 2024-04-15 PROCEDURE — 36415 COLL VENOUS BLD VENIPUNCTURE: CPT

## 2024-04-15 PROCEDURE — 87046 STOOL CULTR AEROBIC BACT EA: CPT

## 2024-04-15 PROCEDURE — 87427 SHIGA-LIKE TOXIN AG IA: CPT

## 2024-04-15 PROCEDURE — 80048 BASIC METABOLIC PNL TOTAL CA: CPT

## 2024-04-15 PROCEDURE — 83036 HEMOGLOBIN GLYCOSYLATED A1C: CPT

## 2024-04-15 PROCEDURE — 80061 LIPID PANEL: CPT

## 2024-04-15 PROCEDURE — 87045 FECES CULTURE AEROBIC BACT: CPT

## 2024-04-15 PROCEDURE — 87493 C DIFF AMPLIFIED PROBE: CPT

## 2024-04-15 PROCEDURE — 84460 ALANINE AMINO (ALT) (SGPT): CPT

## 2024-04-15 RX ORDER — VALACYCLOVIR HYDROCHLORIDE 500 MG/1
500 TABLET, FILM COATED ORAL DAILY
Qty: 90 TABLET | Refills: 0 | Status: SHIPPED | OUTPATIENT
Start: 2024-04-15

## 2024-04-15 NOTE — TELEPHONE ENCOUNTER
Refill passed per protocol.      Patient comment: I have a lot a pain an itchy     Requested Prescriptions   Pending Prescriptions Disp Refills    valACYclovir 500 MG Oral Tab 90 tablet 0     Sig: Take 1 tablet (500 mg total) by mouth daily. No refills until seen       Herpes Agent Protocol Passed - 4/14/2024  8:19 AM        Passed - In person appointment or virtual visit in the past 12 mos or appointment in next 3 mos     Recent Outpatient Visits              [facility]     [facility]    7 months ago Screening for cervical cancer    [facility] - [facility] Leela Bo PA-C    Office Visit    7 months ago [facility] hyper[facility]     [facility]    1 year ago Routine general medical examination at a health care facility    [facility] [place]    Office Visit    2 [facility] [place]     [facility]                             Recent Outpatient Visits              3 months ago Mixed hyperlipidemia    [facility], Ronaldo Reyes,     Office Visit    7 months ago Screening for cervical cancer    [facility] - [facility]GYN Leela Bo PA-C    Office Visit    7 months ago Mixed hyperlipidemia    [facility], Ronaldo Reyes,     Office Visit    1 year ago Routine general medical examination at a health care facility    [facility] in [place]    Office Visit    [facility]    [facility]

## 2024-04-15 NOTE — TELEPHONE ENCOUNTER
Please review. Rx failed/no protocol.    Please advise: This medication has not been prescribed for pt in over 2 years    Requested Prescriptions   Pending Prescriptions Disp Refills    clobetasol 0.05 % External Cream 45 g 0     Sig: Apply 1 Application  topically 2 (two) times daily.       There is no refill protocol information for this order            Recent Outpatient Visits              3 months ago [facility] [place] [facility]     [facility]    7 months ago Screening for cervical cancer    [facility] Leela Bo PA-C    Office Visit    7 months ago [facility] hyperlipidemia    [facility]    [facility]    1 year ago Routine general medical examination at a health care facility    [facility] in [place]    Office Visit    2 years ago Cough    [facility]    [facility] Visit

## 2024-04-15 NOTE — TELEPHONE ENCOUNTER
Current Outpatient Medications:       valACYclovir 500 MG Oral Tab, Take 1 tablet (500 mg total) by mouth daily. No refills until seen, Disp: 90 tablet, Rfl: 0    clobetasol 0.05 % External Cream, Apply 1 Application topically 2 (two) times daily., Disp: 45 g, Rfl: 0

## 2024-04-17 RX ORDER — CLOBETASOL PROPIONATE 0.5 MG/G
1 CREAM TOPICAL 2 TIMES DAILY
Qty: 45 G | Refills: 0 | Status: SHIPPED | OUTPATIENT
Start: 2024-04-17

## 2024-04-18 ENCOUNTER — TELEPHONE (OUTPATIENT)
Dept: FAMILY MEDICINE CLINIC | Facility: CLINIC | Age: 64
End: 2024-04-18

## 2024-04-18 NOTE — TELEPHONE ENCOUNTER
Patient received a message from  from about her recent lab results. Per message  would like patient scheduled for a physical. There are no openings for a physical until 07/08/2024 for a physical. She demanded to be seen on this coming Monday. There are no openings.

## 2024-04-18 NOTE — TELEPHONE ENCOUNTER
Refilled times 1 but needs appointment before the next prescription will be filled. Please call patient and set up a physical exam.

## 2024-04-25 ENCOUNTER — TELEPHONE (OUTPATIENT)
Dept: INTERNAL MEDICINE CLINIC | Facility: CLINIC | Age: 64
End: 2024-04-25

## 2024-04-25 ENCOUNTER — TELEPHONE (OUTPATIENT)
Dept: FAMILY MEDICINE CLINIC | Facility: CLINIC | Age: 64
End: 2024-04-25

## 2024-04-25 ENCOUNTER — OFFICE VISIT (OUTPATIENT)
Dept: FAMILY MEDICINE CLINIC | Facility: CLINIC | Age: 64
End: 2024-04-25
Payer: MEDICAID

## 2024-04-25 VITALS
TEMPERATURE: 97 F | WEIGHT: 181.63 LBS | DIASTOLIC BLOOD PRESSURE: 80 MMHG | HEART RATE: 81 BPM | BODY MASS INDEX: 29.19 KG/M2 | HEIGHT: 66 IN | SYSTOLIC BLOOD PRESSURE: 120 MMHG

## 2024-04-25 DIAGNOSIS — R73.09 ELEVATED GLUCOSE: ICD-10-CM

## 2024-04-25 DIAGNOSIS — Z12.11 SCREENING FOR COLON CANCER: ICD-10-CM

## 2024-04-25 DIAGNOSIS — R91.1 PULMONARY NODULE, LEFT: ICD-10-CM

## 2024-04-25 DIAGNOSIS — E78.2 MIXED HYPERLIPIDEMIA: ICD-10-CM

## 2024-04-25 DIAGNOSIS — Z00.00 ADULT GENERAL MEDICAL EXAM: Primary | ICD-10-CM

## 2024-04-25 DIAGNOSIS — Z72.0 TOBACCO USE: ICD-10-CM

## 2024-04-25 DIAGNOSIS — A60.00 GENITAL HERPES SIMPLEX, UNSPECIFIED SITE: ICD-10-CM

## 2024-04-25 DIAGNOSIS — F41.9 ANXIETY: ICD-10-CM

## 2024-04-25 DIAGNOSIS — H25.13 AGE-RELATED NUCLEAR CATARACT OF BOTH EYES: ICD-10-CM

## 2024-04-25 DIAGNOSIS — F51.04 PSYCHOPHYSIOLOGICAL INSOMNIA: ICD-10-CM

## 2024-04-25 DIAGNOSIS — J30.89 OTHER ALLERGIC RHINITIS: ICD-10-CM

## 2024-04-25 PROCEDURE — G8510 SCR DEP NEG, NO PLAN REQD: HCPCS | Performed by: FAMILY MEDICINE

## 2024-04-25 PROCEDURE — 99396 PREV VISIT EST AGE 40-64: CPT | Performed by: FAMILY MEDICINE

## 2024-04-25 PROCEDURE — 99214 OFFICE O/P EST MOD 30 MIN: CPT | Performed by: FAMILY MEDICINE

## 2024-04-25 RX ORDER — NORTRIPTYLINE HYDROCHLORIDE 10 MG/1
CAPSULE ORAL
Qty: 90 CAPSULE | Refills: 1 | Status: SHIPPED | OUTPATIENT
Start: 2024-04-25

## 2024-04-25 RX ORDER — METFORMIN HYDROCHLORIDE 500 MG/1
500 TABLET, EXTENDED RELEASE ORAL DAILY
Qty: 90 TABLET | Refills: 1 | Status: SHIPPED | OUTPATIENT
Start: 2024-04-25

## 2024-04-25 RX ORDER — ROSUVASTATIN CALCIUM 20 MG/1
20 TABLET, COATED ORAL NIGHTLY
Qty: 90 TABLET | Refills: 2 | Status: SHIPPED | OUTPATIENT
Start: 2024-04-25 | End: 2025-01-20

## 2024-04-25 RX ORDER — VALACYCLOVIR HYDROCHLORIDE 500 MG/1
500 TABLET, FILM COATED ORAL DAILY
Qty: 90 TABLET | Refills: 1 | Status: SHIPPED | OUTPATIENT
Start: 2024-04-25

## 2024-04-25 RX ORDER — MONTELUKAST SODIUM 10 MG/1
10 TABLET ORAL NIGHTLY
Qty: 90 TABLET | Refills: 1 | Status: SHIPPED | OUTPATIENT
Start: 2024-04-25 | End: 2024-10-22

## 2024-04-25 RX ORDER — ACYCLOVIR 50 MG/G
OINTMENT TOPICAL
Qty: 30 G | Refills: 1 | Status: SHIPPED | OUTPATIENT
Start: 2024-04-25

## 2024-04-25 RX ORDER — VALACYCLOVIR HYDROCHLORIDE 500 MG/1
500 TABLET, FILM COATED ORAL DAILY
Qty: 90 TABLET | Refills: 0 | Status: CANCELLED | OUTPATIENT
Start: 2024-04-25

## 2024-04-25 NOTE — TELEPHONE ENCOUNTER
Response to cancel request received from pharmacy.  Received: Today  [facility]  [facility] Refills  The pharmacy received the electronic cancel request, but could not cancel the prescription. Additional follow up tasks may be necessary based on the pharmacy response noted below.    Pharmacy Note: Unable to Cancel Rx. Please contact Pharmacy          Pharmacy    [id number] - [place], [place] - [address] AT [facility], [phone], [phone]   [address] [address]   Phone: [phone] Fax: [phone]   Hours: Not open 24 hours     Outpatient Medication Detail     Disp Refills Start End    atorvastatin 40 MG Oral Tab (Discontinued) 90 tablet 3 3/5/2024 4/25/2024    Sig - Route: Take 1 tablet (40 mg total) by mouth nightly. - Oral    Sent to pharmacy as: Atorvastatin Calcium 40 MG Oral Tablet (Lipitor)    Reason for Discontinue: Stop This Medication     E-Prescribing Status: Receipt confirmed by pharmacy (3/5/2024  2:48 PM CST)    E-Cancel Status: Request denied by pharmacy (4/25/2024 10:11 AM CDT)       E-Cancel Status Note: Unable to Cancel Rx. Please contact Pharmacy      Order Providers    Prescribing Provider Encounter Provider   Ana Valenzuela APRN Kowalczyk, Karen A, APRN         Encounter    View Encounter              This Order Has Been Discontinued    Order Status Reason By On   Discontinued Stop This Medication  Ronaldo Foss DO 4/25/24 1010

## 2024-04-25 NOTE — PATIENT INSTRUCTIONS
Stop the Unisom.  Instead we will bump up the nortriptyline to help you sleep.    ========================================================================    Start taking montelukast 10 mg at bedtime.  This helps with allergies and sinus congestion.  If after 2 to 3 weeks you are still having some allergies and sinus congestion you can add over-the-counter Claritin 10 mg in the morning and continue the montelukast 10 mg at bedtime.

## 2024-04-25 NOTE — PROGRESS NOTES
Patient ID: Jocelyne Bernabe is a 63 year old female.    HPI  Chief Complaint   Patient presents with    Physical     Test results     Medication Request     Last physical on 12/18/2023.  Numerous issues she would like to discuss on top of having a physical exam..    Patient smokes 4-5 cigarettes daily. She works as a .     Patient needs a Valacyclovir as she didn't receive the medication last time; takes the mediation daily for her chronic herpes. Pt states she has breakouts of herpes in her genital area as well. His  is in [place] so she has not been sexually active for quite some time. Pt also needs a Valacyclovir ointment and has been using it with relief.  The lesions that she currently have are on her right buttock but they are drying up.    She feels her Atorvastatin isn't working for her. Pt's sister is on Zetia/Zocor 10/40 mg and wants to try it as it works for her sister. I reviewed her CT Chest in February 2024. I also reviewed her other lab results. Pt feels she isn't losing her weight as much, compared to her exercise level. Pt takes Unisom generic, 2 tabs at once, each capsule has 50 mg of benadryl on top of the Pamelor 10 mg at bedtime as she did not feel the Pamelor by itself is helping enough.  She states her mind races but once she is able to get to sleep, she stays asleep..     Pt would like a prescription for allergies as she sneezes multiple times at night time which wakes her up. She takes Montelukast at night time and needs a refill of it. I provided a refill of the medication.  She has not been on montelukast for some time as she forgot what it was used for.    Health Maintenance   Topic Date Due    Annual Physical  Never done    Colorectal Cancer Screening  Never done    Tobacco Cessation Counseling 1 Year  Never done    COVID-19 Vaccine (3 - 2023-24 season) 09/01/2023    Annual Depression Screening  01/01/2024    Mammogram  12/01/2024    DTaP,Tdap,and Td Vaccines (2 - Td or Tdap)  01/09/2025    Pap Smear  08/30/2026    Influenza Vaccine  Completed    Pneumococcal Vaccine: Birth to 64yrs  Completed    Zoster Vaccines  Completed       =======================================================    Lab Results   Component Value Date    WBC 11.1 (H) 10/27/2023    RBC 5.24 10/27/2023    HGB 15.2 10/27/2023    HCT 44.1 10/27/2023    .0 10/27/2023    MPV 9.2 12/05/2018    MCV 84.2 10/27/2023    MCH 29.0 10/27/2023    MCHC 34.5 10/27/2023    RDW 12.9 10/27/2023    NEPRELIM 7.74 (H) 10/27/2023    NEPERCENT 69.6 10/27/2023    LYPERCENT 21.4 10/27/2023    MOPERCENT 7.5 10/27/2023    EOPERCENT 0.5 10/27/2023    BAPERCENT 0.6 10/27/2023    NE 7.74 (H) 10/27/2023    LYMABS 2.38 10/27/2023    MOABSO 0.83 10/27/2023    EOABSO 0.06 10/27/2023    BAABSO 0.07 10/27/2023       Lab Results   Component Value Date     (H) 04/15/2024    BUN 17 04/15/2024    BUNCREA 21.5 (H) 04/15/2024    CREATSERUM 0.79 04/15/2024    ANIONGAP 6 04/15/2024    GFRNAA 82 01/15/2022    GFRAA 95 01/15/2022    CA 10.3 04/15/2024    OSMOCALC 291 04/15/2024    ALKPHO 137 (H) 12/01/2023    AST 20 04/15/2024    ALT 28 04/15/2024    BILT 0.3 12/01/2023    TP 7.2 12/01/2023    ALB 4.7 12/01/2023    GLOBULIN 2.5 (L) 12/01/2023     04/15/2024    K 4.8 04/15/2024     04/15/2024    CO2 27.0 04/15/2024       Lab Results   Component Value Date     (H) 04/15/2024    BUN 17 04/15/2024    CREATSERUM 0.79 04/15/2024    BUNCREA 21.5 (H) 04/15/2024    ANIONGAP 6 04/15/2024    GFRAA 95 01/15/2022    GFRNAA 82 01/15/2022    CA 10.3 04/15/2024     04/15/2024    K 4.8 04/15/2024     04/15/2024    CO2 27.0 04/15/2024    OSMOCALC 291 04/15/2024       Lab Results   Component Value Date    COLORUR Colorless (A) 10/27/2023    CLARITY Clear 10/27/2023    SPECGRAVITY 1.009 10/27/2023    GLUUR 30 (A) 10/27/2023    BILUR Negative 10/27/2023    KETUR Trace (A) 10/27/2023    BLOODURINE 1+ (A) 10/27/2023    PHURINE 6.5 10/27/2023     PROUR Negative 10/27/2023    UROBILINOGEN Normal 10/27/2023    NITRITE Negative 10/27/2023    LEUUR Negative 10/27/2023    WBCUR 1-5 10/27/2023    RBCUR 0-2 10/27/2023    EPIUR Few (A) 10/27/2023    BACUR None Seen 10/27/2023     Lab Results   Component Value Date     (H) 04/15/2024    A1C 6.7 (H) 04/15/2024    A1C 6.4 (H) 2023    A1C 5.9 (H) 2020       Lab Results   Component Value Date    CHOLEST 212 (H) 04/15/2024    TRIG 218 (H) 04/15/2024    HDL 51 04/15/2024     (H) 04/15/2024    VLDL 39 (H) 04/15/2024    NONHDLC 161 (H) 04/15/2024     TSH (mIU/mL)   Date Value   2023 2.010       =======================================================    Wt Readings from Last 6 Encounters:   24 181 lb 9.6 oz   23 180 lb   10/27/23 175 lb   10/25/23 175 lb   23 177 lb   23 176 lb 3.2 oz               BMI Readings from Last 6 Encounters:   24 29.31 kg/m²   23 29.05 kg/m²   10/27/23 28.25 kg/m²   10/25/23 28.25 kg/m²   23 28.57 kg/m²   23 28.44 kg/m²       BP Readings from Last 6 Encounters:   24 120/80   23 118/75   10/28/23 107/62   10/25/23 131/70   23 124/71   23 129/84         Review of Systems   HENT:  Positive for sneezing.    Respiratory:  Negative for shortness of breath.    Cardiovascular:  Negative for chest pain.   Skin:  Positive for rash.         Past Medical History:    Hyperlipidemia     (normal spontaneous vaginal delivery) (HCC)    2       Past Surgical History:   Procedure Laterality Date    Appendectomy      Back surgery      low back surgery for herniated disc.     Laparoscopic cholecystectomy      Sinus surgery        nasal sinus surgery    Tonsillectomy  1967    and adenoidectomy       Social History     Socioeconomic History    Marital status:      Spouse name: Not on file    Number of children: Not on file    Years of education: Not on file    Highest education level: Not on  file   Occupational History    Not on file   Tobacco Use    Smoking status: Every Day     Current packs/day: 0.25     Average packs/day: 0.3 packs/day for 32.0 years (8.0 ttl pk-yrs)     Types: Cigarettes    Smokeless tobacco: Never    Tobacco comments:     5 cigarettes/day   Vaping Use    Vaping status: Never Used   Substance and Sexual Activity    Alcohol use: No     Alcohol/week: 0.0 standard drinks of alcohol    Drug use: No    Sexual activity: Never   Other Topics Concern    Caffeine Concern Yes     Comment: Coffee: 2-3 daily    Exercise Yes    Seat Belt Not Asked    Special Diet Not Asked    Stress Concern Not Asked    Weight Concern Not Asked     Service Not Asked    Blood Transfusions Not Asked    Occupational Exposure Not Asked    Hobby Hazards Not Asked    Sleep Concern Not Asked    Back Care Not Asked    Bike Helmet Not Asked    Self-Exams Not Asked   Social History Narrative    Not on file     Social Determinants of Health     Financial Resource Strain: Not on file   Food Insecurity: Low Risk  (10/28/2022)    Received from [facility]    Food Insecurity     Have there been times that your food ran out, and you didn't have money to get more?: No     Are there times that you worry that this might happen?: No   Transportation Needs: Low Risk  (10/28/2022)    Received from [facility]    Transportation Needs     Do you have trouble getting transportation to medical appointments?: No     How do you normally get to and from your appointments?: Not on file   Physical Activity: Not on file   Stress: Not on file   Social Connections: Not on file   Housing Stability: Low Risk  (10/28/2022)    Received from [facility]    Housing Stability     Are you concerned about having a safe and reliable place to live?: No          Current Outpatient  Medications   Medication Sig Dispense Refill    valACYclovir 500 MG Oral Tab Take 1 tablet (500 mg total) by mouth daily. 90 tablet 0    atorvastatin 40 MG Oral Tab Take 1 tablet (40 mg total) by mouth nightly. 90 tablet 3    nortriptyline (PAMELOR) 10 MG Oral Cap Take 1 capsule (10 mg total) by mouth nightly. If after 1 week still in pain start taking 2 at night. 60 capsule 2    azelastine 0.1 % Nasal Solution 2 sprays by Nasal route 2 (two) times daily. Squeeze nose after sprays and do not snort it back into throat. 3 each 1    montelukast (SINGULAIR) 10 MG Oral Tab Take 1 tablet (10 mg total) by mouth nightly. 90 tablet 1    Omeprazole 40 MG Oral Capsule Delayed Release Take 1 capsule (40 mg total) by mouth daily. 90 capsule 1                                               Allergies:  Allergies   Allergen Reactions    Shellfish Allergy SWELLING      PHYSICAL EXAM:   Physical Exam      Physical Exam   Constitutional: . She appears well-developed and well-nourished. No distress.   Head: Normocephalic.   Right Ear: Tympanic membrane and ear canal normal.   Left Ear: Tympanic membrane and ear canal normal.   Nose: Mild congestion but no infection.  Mouth/Throat: Oropharynx is clear and moist and mucous membranes are normal.   Eyes: Conjunctivae and EOM are normal. Pupils are equal, round, and reactive to light.   Neck: Normal range of motion. Neck supple. No thyromegaly present.   Cardiovascular: Normal rate, regular rhythm and no murmur heard.  Pulmonary/Chest: Effort normal and breath sounds normal. No respiratory distress.   Abdominal: Soft. Bowel sounds are normal. There is no hepatosplenomegaly. There is no tenderness.   Lymphadenopathy: She has no cervical adenopathy.   Neurological: She is alert and oriented to person, place, and time. She has normal reflexes. No cranial nerve deficit.   Skin: Skin is warm and dry. She has grouped vesicles on her right buttock that are healing.   Psychiatric: She has a anxious  affect and does have some rapid speech.  Goes from 1 subject to another.  Lower legs: No edema of the legs bilaterally.  Bilateral barefoot skin diabetic exam is normal, visualized feet and the appearance is normal.  Bilateral monofilament/sensation of both feet is normal.  Pulsation pedal pulse exam of both lower legs/feet is normal as well.      Vitals reviewed.    Blood pressure 120/80, pulse 81, temperature 96.7 °F (35.9 °C), height 5' 6\" (1.676 m), weight 181 lb 9.6 oz, not currently breastfeeding.         ASSESSMENT/PLAN:     Diagnoses and all orders for this visit:    Adult general medical exam  Reviewed labs with her.  Genital herpes simplex, unspecified site  -     valACYclovir 500 MG Oral Tab; Take 1 tablet (500 mg total) by mouth daily.  -     acyclovir 5 % External Ointment; 1 application 3-4 times daily on the area of outbreak for 1 week.  Refilled the Valtrex and the acyclovir.  She does take Valtrex daily to decrease outbreaks.  Screening for colon cancer  -     COLOGUARD COLON CANCER SCREENING (EXTERNAL)  She agrees to do the Cologuard cancer screening.  Anxiety  -     nortriptyline (PAMELOR) 10 MG Oral Cap; Take 2 capsules at nighttime to help you sleep.  If after 2 to 3 weeks you are still having trouble sleeping, start taking 3 capsules at nighttime.  The anxiety and sleep have become an issue for her.  I do not think taking the 100 mg of Benadryl is a good idea and I think this would decrease cognition and increased lethargy the next day.  She does admit that she is tired quite a bit.  We will try nortriptyline at a higher dose and have her stop the Unisom.  Age-related nuclear cataract of both eyes  She does see the ophthalmologist  Other allergic rhinitis  -     montelukast (SINGULAIR) 10 MG Oral Tab; Take 1 tablet (10 mg total) by mouth nightly.  See patient instructions  Pulmonary nodule, left  Reviewed CAT scan with her  Mixed hyperlipidemia  -     rosuvastatin (CRESTOR) 20 MG Oral Tab; Take  1 tablet (20 mg total) by mouth nightly. For cholesterol.  I do not think the Zetia/simvastatin combination will be covered and I think would be better to try Crestor 20 mg and see how she does.  Elevated glucose  -     metFORMIN  MG Oral Tablet 24 Hr; Take 1 tablet (500 mg total) by mouth daily. For elevated sugar  The hemoglobin A1c is elevated and she has been having trouble losing weight.  We are going to start her on metformin.  Tobacco use  Must try to quit smoking completely.  Psychophysiological insomnia  -     nortriptyline (PAMELOR) 10 MG Oral Cap; Take 2 capsules at nighttime to help you sleep.  If after 2 to 3 weeks you are still having trouble sleeping, start taking 3 capsules at nighttime.            Referrals (if applicable)  Orders Placed This Encounter   Procedures    COLOGUARD COLON CANCER SCREENING (EXTERNAL)     Cologuard is a noninvasive colon cancer screening test for people 45 years of age and older who are at average risk for colon cancer.          Follow up if symptoms persist.  Take medicine (if given) as prescribed.  Approach to treatment discussed and patient/family member understands and agrees to plan.     Return in about 2 months (around 6/25/2024) for Mood Medicine Followup, elevated sugar.    Patient Instructions   Stop the Unisom.  Instead we will bump up the nortriptyline to help you sleep.    ========================================================================    Start taking montelukast 10 mg at bedtime.  This helps with allergies and sinus congestion.  If after 2 to 3 weeks you are still having some allergies and sinus congestion you can add over-the-counter Claritin 10 mg in the morning and continue the montelukast 10 mg at bedtime.    Gavin Acevedo    4/25/2024    By signing my name below, I, Gavin Acevedo,  attest that this documentation has been prepared under the direction and in the presence of Ronaldo Foss DO.   Electronically Signed: Gavin Acevedo, 4/25/2024, 9:54  AM.    I, Ronaldo Foss DO,  personally performed the services described in this documentation. All medical record entries made by the scribe were at my direction and in my presence.  I have reviewed the chart and discharge instructions (if applicable) and agree that the record reflects my personal performance and is accurate and complete.  Ronaldo Foss DO, 4/25/2024, 12:10 PM

## 2024-04-26 NOTE — TELEPHONE ENCOUNTER
Approved    Prior authorization approved  Payer: [employer] [facility] Case ID: [id number]    [phone]  Note from payer: The case has been Approved from  [id number]  Approval Details    Authorized from January 27, 2024 to April 26, 2025  Electronic appeal: Not supported  View History

## 2024-05-26 DIAGNOSIS — F41.9 ANXIETY: ICD-10-CM

## 2024-05-26 DIAGNOSIS — F51.04 PSYCHOPHYSIOLOGICAL INSOMNIA: ICD-10-CM

## 2024-05-27 ENCOUNTER — APPOINTMENT (OUTPATIENT)
Dept: GENERAL RADIOLOGY | Age: 64
End: 2024-05-27
Attending: NURSE PRACTITIONER

## 2024-05-27 ENCOUNTER — HOSPITAL ENCOUNTER (OUTPATIENT)
Age: 64
Discharge: HOME | End: 2024-05-27

## 2024-05-27 ENCOUNTER — TELEPHONE (OUTPATIENT)
Dept: FAMILY MEDICINE CLINIC | Facility: CLINIC | Age: 64
End: 2024-05-27

## 2024-05-27 VITALS
SYSTOLIC BLOOD PRESSURE: 118 MMHG | RESPIRATION RATE: 18 BRPM | TEMPERATURE: 97 F | OXYGEN SATURATION: 97 % | DIASTOLIC BLOOD PRESSURE: 53 MMHG | HEART RATE: 88 BPM

## 2024-05-27 DIAGNOSIS — M54.50 LOW BACK PAIN AT MULTIPLE SITES: Primary | ICD-10-CM

## 2024-05-27 DIAGNOSIS — K21.9 GASTROESOPHAGEAL REFLUX DISEASE: ICD-10-CM

## 2024-05-27 PROCEDURE — 99213 OFFICE O/P EST LOW 20 MIN: CPT | Performed by: NURSE PRACTITIONER

## 2024-05-27 PROCEDURE — 96372 THER/PROPH/DIAG INJ SC/IM: CPT | Performed by: NURSE PRACTITIONER

## 2024-05-27 PROCEDURE — 72100 X-RAY EXAM L-S SPINE 2/3 VWS: CPT | Performed by: NURSE PRACTITIONER

## 2024-05-27 RX ORDER — METHYLPREDNISOLONE 4 MG/1
TABLET ORAL
Qty: 1 EACH | Refills: 0 | Status: SHIPPED | OUTPATIENT
Start: 2024-05-27 | End: 2024-05-29

## 2024-05-27 RX ORDER — KETOROLAC TROMETHAMINE 30 MG/ML
60 INJECTION, SOLUTION INTRAMUSCULAR; INTRAVENOUS ONCE
Status: COMPLETED | OUTPATIENT
Start: 2024-05-27 | End: 2024-05-27

## 2024-05-27 RX ORDER — OMEPRAZOLE 40 MG/1
40 CAPSULE, DELAYED RELEASE ORAL DAILY
Qty: 90 CAPSULE | Refills: 1 | Status: CANCELLED | OUTPATIENT
Start: 2024-05-27

## 2024-05-27 NOTE — ED PROVIDER NOTES
Patient Seen in: [facility]      History     Chief Complaint   Patient presents with    Back Pain     Stated Complaint: lower back pain  Subjective:   64-year-old female with a history of hyperlipidemia presents for a lower back strain.  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  She states now she has pain across the lower back that is worse on the lateral aspects of the lower back.  The pain is reproducible with movement.  At times radiates into the bilateral buttocks.  She states she has had this pain in the past.  Last time she had it was 2 years ago.  She states the Medrol Dosepak helped the pain.  She has never had imaging on her back.  She is ambulatory without difficulty.  No fevers.  No urinary symptoms or hematuria.  No numbness or tingling.  No weakness or incontinence.  No rashes or skin abnormalities.  No abdominal pain.  She appears nontoxic.      Objective:   Past Medical History:    Hyperlipidemia     (normal spontaneous vaginal delivery) ([facility])    2            Past Surgical History:   Procedure Laterality Date    Appendectomy      Back surgery      low back surgery for herniated disc.     Laparoscopic cholecystectomy      Sinus surgery        nasal sinus surgery    Tonsillectomy  1967    and adenoidectomy              Social History     Socioeconomic History    Marital status:    Tobacco Use    Smoking status: Every Day     Current packs/day: 0.25     Average packs/day: 0.3 packs/day for 32.0 years (8.0 ttl pk-yrs)     Types: Cigarettes    Smokeless tobacco: Never    Tobacco comments:     5 cigarettes/day   Vaping Use    Vaping status: Never Used   Substance and Sexual Activity    Alcohol use: No     Alcohol/week: 0.0 standard drinks of alcohol    Drug use: No    Sexual activity: Never   Other Topics Concern    Caffeine  Concern Yes     Comment: Coffee: 2-3 daily    Exercise Yes     Social Determinants of Health     Food Insecurity: Low Risk  (10/28/2022)    Received from [facility]    Food Insecurity     Have there been times that your food ran out, and you didn't have money to get more?: No     Are there times that you worry that this might happen?: No   Transportation Needs: Low Risk  (10/28/2022)    Received from [facility]    Transportation Needs     Do you have trouble getting transportation to medical appointments?: No   Housing Stability: Low Risk  (10/28/2022)    Received from [facility]    Housing Stability     Are you concerned about having a safe and reliable place to live?: No            Review of Systems    Positive for stated complaint: lower back pain  Other systems are as noted in HPI.  Constitutional and vital signs reviewed.      All other systems reviewed and negative except as noted above.    Physical Exam     ED Triage Vitals [05/27/24 0901]   /53   Pulse 88   Resp 18   Temp 97 °F (36.1 °C)   Temp src Temporal   SpO2 97 %   O2 Device None (Room air)     Current:/53   Pulse 88   Temp 97 °F (36.1 °C) (Temporal)   Resp 18   SpO2 97%     Physical Exam  Vitals and nursing note reviewed.   Constitutional:       General: She is not in acute distress.     Appearance: Normal appearance. She is not toxic-appearing.   HENT:      Head: Normocephalic.      Nose: Nose normal.      Mouth/Throat:      Mouth: Mucous membranes are moist.   Eyes:      Pupils: Pupils are equal, round, and reactive to light.   Cardiovascular:      Rate and Rhythm: Normal rate and regular rhythm.   Pulmonary:      Effort: Pulmonary effort is normal.      Breath sounds: Normal breath sounds.   Abdominal:      General: There is no distension.      Palpations: Abdomen is  soft.      Tenderness: There is no abdominal tenderness. There is no right CVA tenderness, left CVA tenderness or guarding.   Musculoskeletal:      Cervical back: Normal and normal range of motion.      Thoracic back: Normal.      Lumbar back: Spasms and tenderness present. No swelling, edema, deformity or bony tenderness. Decreased range of motion. Negative right straight leg raise test and negative left straight leg raise test.      Right lower leg: No edema.      Left lower leg: No edema.      Comments: Pain across the lower back with movement.  Ambulatory without difficulty.  The pain radiates into the bilateral buttocks.  CMS intact.  No point tenderness to the lumbar spine.  Negative bilateral leg raise.   Skin:     General: Skin is warm and dry.      Capillary Refill: Capillary refill takes less than 2 seconds.      Findings: No rash.   Neurological:      General: No focal deficit present.      Mental Status: She is alert and oriented to person, place, and time.      Cranial Nerves: No cranial nerve deficit.      Sensory: No sensory deficit.      Motor: No weakness.      Coordination: Coordination normal.      Gait: Gait normal.   Psychiatric:         Mood and Affect: Mood normal.         Behavior: Behavior normal.         ED Course   XR LUMBAR SPINE (MIN 2 VIEWS) (CPT=72100)    Result Date: 5/27/2024  CONCLUSION:  1. Multilevel degenerative disc disease/spondylosis most pronounced at L3-4 and L4-5.    Dictated by (CST): Drew Andrade MD on 5/27/2024 at 9:36 AM     Finalized by (CST): Drew Andrade MD on 5/27/2024 at 9:37 AM         Labs Reviewed - No data to display    MDM     Medical Decision Making  An x-ray was done which shows spondylosis, no other acute process.  The patient was given 60 mg of Toradol IM with relief.  This is most likely a muscle strain with sciatica.  We discussed ice to the back and gentle stretching exercises.  I will prescribe her Medrol Dosepak to take as prescribed.  She will  follow-up with her primary care doctor if her symptoms persist or worsen.    Amount and/or Complexity of Data Reviewed  Independent Historian: spouse  Radiology: ordered.     Details: An x-ray of the lumbar spine was done due to the patient never having imaging of her back in the past.  The x-ray shows spondylosis, no other acute process.    Risk  Prescription drug management.  Risk Details: Lumbar fracture versus  lumbar strain        Disposition and Plan     Clinical Impression:  1. Low back pain at multiple sites         Disposition:  Discharge  5/27/2024  9:52 am    Follow-up:  Ronaldo Foss,   [address] [place] [address] [address]  [place] [address]  [phone]    Schedule an appointment as soon as possible for a visit       Ronaldo Foss [address]  [place] [address]  [phone]    Schedule an appointment as soon as possible for a visit in 3 days            Medications Prescribed:  Discharge Medication List as of 5/27/2024  9:56 AM        START taking these medications    Details   !! methylPREDNISolone (MEDROL) 4 MG Oral Tablet Therapy Pack Dosepack: take as directed, Normal, Disp-1 each, R-0       !! - Potential duplicate medications found. Please discuss with provider.

## 2024-05-27 NOTE — DISCHARGE INSTRUCTIONS
Ice to the back.  Gentle stretching exercises.  Take the medication as prescribed.  Follow-up with your doctor if your symptoms persist.  Return for any concerns.

## 2024-05-27 NOTE — ED INITIAL ASSESSMENT (HPI)
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 Initially started on left side, now whole lower back. Pain increases with movement. Denies dysuria, numbness to extremities.

## 2024-05-28 ENCOUNTER — HOSPITAL ENCOUNTER (EMERGENCY)
Facility: HOSPITAL | Age: 64
Discharge: HOME | End: 2024-05-28
Attending: EMERGENCY MEDICINE

## 2024-05-28 ENCOUNTER — HOSPITAL ENCOUNTER (OUTPATIENT)
Age: 64
Discharge: ANOTHER HOSPITAL | End: 2024-05-28

## 2024-05-28 ENCOUNTER — APPOINTMENT (OUTPATIENT)
Dept: CT IMAGING | Facility: HOSPITAL | Age: 64
End: 2024-05-28
Attending: EMERGENCY MEDICINE

## 2024-05-28 VITALS
SYSTOLIC BLOOD PRESSURE: 149 MMHG | OXYGEN SATURATION: 100 % | RESPIRATION RATE: 20 BRPM | TEMPERATURE: 98 F | HEART RATE: 94 BPM | DIASTOLIC BLOOD PRESSURE: 84 MMHG

## 2024-05-28 VITALS
HEART RATE: 77 BPM | SYSTOLIC BLOOD PRESSURE: 144 MMHG | WEIGHT: 178 LBS | DIASTOLIC BLOOD PRESSURE: 80 MMHG | OXYGEN SATURATION: 99 % | BODY MASS INDEX: 28.61 KG/M2 | HEIGHT: 66 IN | TEMPERATURE: 99 F | RESPIRATION RATE: 21 BRPM

## 2024-05-28 DIAGNOSIS — R51.9 ACUTE INTRACTABLE HEADACHE, UNSPECIFIED HEADACHE TYPE: Primary | ICD-10-CM

## 2024-05-28 DIAGNOSIS — R51.9 ACUTE NONINTRACTABLE HEADACHE, UNSPECIFIED HEADACHE TYPE: Primary | ICD-10-CM

## 2024-05-28 PROCEDURE — 70450 CT HEAD/BRAIN W/O DYE: CPT | Performed by: EMERGENCY MEDICINE

## 2024-05-28 PROCEDURE — 99284 EMERGENCY DEPT VISIT MOD MDM: CPT

## 2024-05-28 PROCEDURE — 99215 OFFICE O/P EST HI 40 MIN: CPT | Performed by: PHYSICIAN ASSISTANT

## 2024-05-28 RX ORDER — ACETAMINOPHEN 500 MG
1000 TABLET ORAL ONCE
Status: COMPLETED | OUTPATIENT
Start: 2024-05-28 | End: 2024-05-28

## 2024-05-28 RX ORDER — DIPHENHYDRAMINE HCL 25 MG
50 CAPSULE ORAL ONCE
Status: COMPLETED | OUTPATIENT
Start: 2024-05-28 | End: 2024-05-28

## 2024-05-29 ENCOUNTER — OFFICE VISIT (OUTPATIENT)
Dept: FAMILY MEDICINE CLINIC | Facility: CLINIC | Age: 64
End: 2024-05-29

## 2024-05-29 ENCOUNTER — TELEPHONE (OUTPATIENT)
Dept: FAMILY MEDICINE CLINIC | Facility: CLINIC | Age: 64
End: 2024-05-29

## 2024-05-29 ENCOUNTER — LAB ENCOUNTER (OUTPATIENT)
Dept: LAB | Age: 64
End: 2024-05-29
Attending: FAMILY MEDICINE
Payer: MEDICAID

## 2024-05-29 VITALS
RESPIRATION RATE: 17 BRPM | WEIGHT: 181.38 LBS | HEIGHT: 66 IN | BODY MASS INDEX: 29.15 KG/M2 | HEART RATE: 86 BPM | DIASTOLIC BLOOD PRESSURE: 73 MMHG | SYSTOLIC BLOOD PRESSURE: 113 MMHG

## 2024-05-29 DIAGNOSIS — E11.9 TYPE 2 DIABETES MELLITUS WITHOUT COMPLICATION, WITHOUT LONG-TERM CURRENT USE OF INSULIN (HCC): ICD-10-CM

## 2024-05-29 DIAGNOSIS — R03.0 ELEVATED BLOOD PRESSURE READING: ICD-10-CM

## 2024-05-29 DIAGNOSIS — R07.89 CHEST PRESSURE: ICD-10-CM

## 2024-05-29 DIAGNOSIS — M15.9 PRIMARY OSTEOARTHRITIS INVOLVING MULTIPLE JOINTS: Primary | ICD-10-CM

## 2024-05-29 DIAGNOSIS — K21.9 GASTROESOPHAGEAL REFLUX DISEASE: ICD-10-CM

## 2024-05-29 LAB
ANION GAP SERPL CALC-SCNC: 6 MMOL/L (ref 0–18)
BUN BLD-MCNC: 21 MG/DL (ref 9–23)
BUN/CREAT SERPL: 25.6 (ref 10–20)
CALCIUM BLD-MCNC: 10.3 MG/DL (ref 8.7–10.4)
CHLORIDE SERPL-SCNC: 105 MMOL/L (ref 98–112)
CO2 SERPL-SCNC: 28 MMOL/L (ref 21–32)
CREAT BLD-MCNC: 0.82 MG/DL
CREAT UR-SCNC: 46.6 MG/DL
EGFRCR SERPLBLD CKD-EPI 2021: 80 ML/MIN/1.73M2 (ref 60–?)
FASTING STATUS PATIENT QL REPORTED: NO
GLUCOSE BLD-MCNC: 154 MG/DL (ref 70–99)
MICROALBUMIN UR-MCNC: <0.3 MG/DL
OSMOLALITY SERPL CALC.SUM OF ELEC: 294 MOSM/KG (ref 275–295)
POTASSIUM SERPL-SCNC: 4.6 MMOL/L (ref 3.5–5.1)
SODIUM SERPL-SCNC: 139 MMOL/L (ref 136–145)

## 2024-05-29 PROCEDURE — 82570 ASSAY OF URINE CREATININE: CPT

## 2024-05-29 PROCEDURE — 36415 COLL VENOUS BLD VENIPUNCTURE: CPT

## 2024-05-29 PROCEDURE — 99214 OFFICE O/P EST MOD 30 MIN: CPT | Performed by: FAMILY MEDICINE

## 2024-05-29 PROCEDURE — 82043 UR ALBUMIN QUANTITATIVE: CPT

## 2024-05-29 PROCEDURE — 80048 BASIC METABOLIC PNL TOTAL CA: CPT

## 2024-05-29 RX ORDER — NORTRIPTYLINE HYDROCHLORIDE 10 MG/1
20 CAPSULE ORAL NIGHTLY
Qty: 180 CAPSULE | Refills: 1 | OUTPATIENT
Start: 2024-05-29

## 2024-05-29 RX ORDER — OMEPRAZOLE 40 MG/1
40 CAPSULE, DELAYED RELEASE ORAL DAILY
Qty: 90 CAPSULE | Refills: 1 | Status: SHIPPED | OUTPATIENT
Start: 2024-05-29

## 2024-05-29 NOTE — ED INITIAL ASSESSMENT (HPI)
Pt arrives ambulatory to ED for c/o HA, \"redness to face\", and HTN. Pt states she just started a new steroid yesterday. Pt states compressing headache to the top of her head, rated as a 10/10. Aox4, speaking in full sentences.

## 2024-05-29 NOTE — TELEPHONE ENCOUNTER
Patient states she is taking 20mg nightly.  Asked patient to let us know when she needs a refill.

## 2024-05-29 NOTE — TELEPHONE ENCOUNTER
Patient called, verified Name and . States she went to Urgent Care yesterday for headache and high blood pressure at home. She was advised to go to Emergency Department for further evaluation. CT negative. States this morning she woke up with constant pain in the left side of the breast when taking a deep breath. Denies shortness of breath or difficulty of breathing at this time.     Remains to have mild pain in the back; seen [facility]  for this and was started on methylprednisolone.     Requesting an appointment for followup today. Appointment scheduled.    Future Appointments   Date Time Provider Department Center   2024  3:30 PM Diana Drake MD [facility] [place]   2024 11:15 AM [facility]

## 2024-05-29 NOTE — PROGRESS NOTES
Jocelyne Bernabe is a 64 year old female.  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  She reports that she has been taking metformin but she does not feel that medication is helping her to control her glucose.  She reports underlying history of osteoarthritis, when she needed anything she reported she was receiving treatment for this but she has not established any specific plan of care regarding her pain  Current Outpatient Medications   Medication Sig Dispense Refill    diclofenac 50 MG Oral Tab EC Take 1 tablet (50 mg total) by mouth 3 (three) times daily. 90 tablet 0    Omeprazole 40 MG Oral Capsule Delayed Release Take 1 capsule (40 mg total) by mouth daily. 90 capsule 1    valACYclovir 500 MG Oral Tab Take 1 tablet (500 mg total) by mouth daily. 90 tablet 1    acyclovir 5 % External Ointment 1 application 3-4 times daily on the area of outbreak for 1 week. 30 g 1    rosuvastatin (CRESTOR) 20 MG Oral Tab Take 1 tablet (20 mg total) by mouth nightly. For cholesterol. 90 tablet 2    metFORMIN  MG Oral Tablet 24 Hr Take 1 tablet (500 mg total) by mouth daily. For elevated sugar 90 tablet 1    nortriptyline (PAMELOR) 10 MG Oral Cap Take 2 capsules at nighttime to help you sleep.  If after 2 to 3 weeks you are still having trouble sleeping, start taking 3 capsules at nighttime. 90 capsule 1    montelukast (SINGULAIR) 10 MG Oral Tab Take 1 tablet (10 mg total) by mouth nightly. 90 tablet 1    azelastine 0.1 % Nasal Solution 2 sprays by Nasal route 2 (two) times daily.  Squeeze nose after sprays and do not snort it back into throat. 3 each 1    clobetasol 0.05 % External Cream Apply 1 Application  topically 2 (two) times daily. (Patient not taking: Reported on 2024) 45 g 0    benzonatate 200 MG Oral Cap Take 1 capsule (200 mg total) by mouth 3 (three) times daily as needed. (Patient not taking: Reported on 2023) 30 capsule 0      Past Medical History:    Diabetes ([facility])    Hyperlipidemia     (normal spontaneous vaginal delivery) ([facility])    2      Social History:  Social History     Socioeconomic History    Marital status:    Tobacco Use    Smoking status: Every Day     Current packs/day: 0.25     Average packs/day: 0.3 packs/day for 32.0 years (8.0 ttl pk-yrs)     Types: Cigarettes    Smokeless tobacco: Never    Tobacco comments:     5 cigarettes/day   Vaping Use    Vaping status: Never Used   Substance and Sexual Activity    Alcohol use: No     Alcohol/week: 0.0 standard drinks of alcohol    Drug use: No    Sexual activity: Never   Other Topics Concern    Caffeine Concern Yes     Comment: Coffee: 2-3 daily    Exercise Yes     Social Determinants of Health     Food Insecurity: Low Risk  (10/28/2022)    Received from [facility]    Food Insecurity     Have there been times that your food ran out, and you didn't have money to get more?: No     Are there times that you worry that this might happen?: No   Transportation Needs: Low Risk  (10/28/2022)    Received from [facility]    Transportation Needs     Do you have trouble getting transportation to medical appointments?: No   Housing Stability: Low Risk  (10/28/2022)    Received from [facility]    Housing Stability     Are you concerned about having a safe and reliable place to live?: No          EXAM:   /73   Pulse 86   Resp 17   Ht 5' 6\"  (1.676 m)   Wt 181 lb 6.4 oz (82.3 kg)   BMI 29.28 kg/m²     Physical Exam  Vitals and nursing note reviewed.   Constitutional:       Appearance: Normal appearance.   HENT:      Head: Normocephalic and atraumatic.   Cardiovascular:      Rate and Rhythm: Normal rate and regular rhythm.      Pulses: Normal pulses.      Heart sounds: Normal heart sounds, S1 normal and S2 normal.   Pulmonary:      Effort: Pulmonary effort is normal. No respiratory distress.      Breath sounds: Normal breath sounds.   Abdominal:      Tenderness: There is no abdominal tenderness. There is no guarding.   Skin:     General: Skin is warm and dry.      Comments: Flushing noted in the upper chest, no edema, no rash noted   Neurological:      General: No focal deficit present.      Mental Status: She is alert and oriented to person, place, and time. Mental status is at baseline.   Psychiatric:         Mood and Affect: Mood normal.         Behavior: Behavior normal.            ASSESSMENT AND PLAN:   1. Primary osteoarthritis involving multiple joints  Patient instructed to discontinue Medrol pack, which initially, take with full stomach, referral to BPH as well, referral to physiatry provided  - diclofenac 50 MG Oral Tab EC; Take 1 tablet (50 mg total) by mouth 3 (three) times daily.  Dispense: 90 tablet; Refill: 0  - PHYSIATRY - INTERNAL    2. Type 2 diabetes mellitus without complication, without long-term current use of insulin ([facility])  Patient on Medrol Dosepak, recent diagnosis of diabetes, will complete follow-up labs to monitor response to the use of steroids  - Microalb/Creat Ratio, Random Urine; Future  - Basic Metabolic Panel (8) [E]; Future    3. Gastroesophageal reflux disease    - Omeprazole 40 MG Oral Capsule Delayed Release; Take 1 capsule (40 mg total) by mouth daily.  Dispense: 90 capsule; Refill: 1    4. Chest pressure  Symptomatic at time of exam, patient to complete EKG, may need to complete the stress test as well but  patient denies any other alarm signs besides the 1 episode of chest pressure that has now resolved  - EKG 12 Lead to be performed at [facility]; Future    5.  Elevated diastolic blood pressure  Normalized in clinic, possibly secondary to the use of Medrol pack, patient instructed to start tracking levels at home and bring log    The patient indicates understanding of these issues and agrees to the plan.      The above note was creating using Dragon speech recognition technology. Please excuse any typos.

## 2024-05-29 NOTE — ED INITIAL ASSESSMENT (HPI)
Pt complaining of HA that started today.  +feeling flushed.  No chest pain or sob.  Pt states her bp was high at home.

## 2024-05-29 NOTE — ED PROVIDER NOTES
Patient Seen in: [facility] Emergency Department    History     Chief Complaint   Patient presents with    HTN    Headache    Rash       HPI    The patient presents to the ED complaining of elevated blood pressure as well as a headache starting today.  She states the headache is severe and no history of similar headaches in the past.  Also notes that her face and neck were red after the headache started.    History reviewed.   Past Medical History:    Diabetes ([facility])    Hyperlipidemia     (normal spontaneous vaginal delivery) ([facility])    2       History reviewed.   Past Surgical History:   Procedure Laterality Date    Appendectomy      Back surgery      low back surgery for herniated disc.     Laparoscopic cholecystectomy      Sinus surgery        nasal sinus surgery    Tonsillectomy  1967    and adenoidectomy         Medications :  (Not in a hospital admission)       Family History   Problem Relation Age of Onset    Heart Disorder Father     Schizophrenia Mother     Cancer Daughter         leukemia    Other (lymphoma) Brother     Breast Cancer Neg     Ovarian Cancer Neg        Smoking Status:   Social History     Socioeconomic History    Marital status:    Tobacco Use    Smoking status: Every Day     Current packs/day: 0.25     Average packs/day: 0.3 packs/day for 32.0 years (8.0 ttl pk-yrs)     Types: Cigarettes    Smokeless tobacco: Never    Tobacco comments:     5 cigarettes/day   Vaping Use    Vaping status: Never Used   Substance and Sexual Activity    Alcohol use: No     Alcohol/week: 0.0 standard drinks of alcohol    Drug use: No    Sexual activity: Never   Other Topics Concern    Caffeine Concern Yes     Comment: Coffee: 2-3 daily    Exercise Yes       Constitutional and vital signs reviewed.      Social History and Family History elements reviewed from today, pertinent positives to the presenting problem noted.    Physical Exam     ED Triage Vitals [24]   BP  131/78   Pulse 90   Resp 18   Temp 98.6 °F (37 °C)   Temp src Temporal   SpO2 100 %   O2 Device None (Room air)       All measures to prevent infection transmission during my interaction with the patient were taken. Handwashing was performed prior to and after the exam.  Stethoscope and any equipment used during my examination was cleaned with super sani-cloth germicidal wipes following the exam.     Physical Exam  Vitals and nursing note reviewed.   Constitutional:       General: She is not in acute distress.     Appearance: She is well-developed.   HENT:      Head: Normocephalic and atraumatic.   Eyes:      General:         Right eye: No discharge.         Left eye: No discharge.      Conjunctiva/sclera: Conjunctivae normal.   Neck:      Trachea: No tracheal deviation.   Cardiovascular:      Rate and Rhythm: Normal rate and regular rhythm.   Pulmonary:      Effort: Pulmonary effort is normal. No respiratory distress.      Breath sounds: No stridor.   Abdominal:      General: There is no distension.      Palpations: Abdomen is soft.   Musculoskeletal:         General: No deformity.   Skin:     General: Skin is warm and dry.      Findings: Erythema present.      Comments: Mildly erythematous skin to the anterior upper chest and neck   Neurological:      General: No focal deficit present.      Mental Status: She is alert and oriented to person, place, and time.   Psychiatric:         Mood and Affect: Mood normal.         Behavior: Behavior normal.         ED Course      Labs Reviewed - No data to display    As Interpreted by me    Imaging Results Available and Reviewed while in ED: CT BRAIN OR HEAD (04176)    Result Date: 5/28/2024  CONCLUSION:   No acute intracranial process by noncontrast CT technique.   Dictated by (CST): Uche Manley MD on 5/28/2024 at 9:57 PM     Finalized by (CST): Uche Manley MD on 5/28/2024 at 9:59 PM         ED Medications Administered:   Medications   diphenhydrAMINE (Benadryl)  cap/tab 50 mg (50 mg Oral Given 5/28/24 2126)   acetaminophen (Tylenol Extra Strength) tab 1,000 mg (1,000 mg Oral Given 5/28/24 2126)         MDM     Vitals:    05/28/24 1951 05/28/24 1952 05/28/24 2030 05/28/24 2210   BP: 131/78 153/87 155/79 144/80   Pulse: 90  85 77   Resp: 18   21   Temp: 98.6 °F (37 °C)      TempSrc: Temporal      SpO2: 100%  97% 99%   Weight: 80.7 kg      Height: 167.6 cm (5' 6\")        *I personally reviewed and interpreted all ED vitals.    Pulse Ox: 99%, Room air, Normal     Monitor Interpretation:   normal sinus rhythm as interpreted by me.  The cardiac monitor was ordered to monitor heart rate.    Differential Diagnosis/ Diagnostic Considerations: Nonspecific headache, ICH, uncontrolled hypertension, other    Complicating Factors: The patient already has does not have any pertinent problems on file. to contribute to the complexity of this ED evaluation.    Medical Decision Making  The patient presents to the ED with a headache and high blood pressure.  Blood pressure normalized in the ED without intervention.  CT brain without ICH.  Patient reassured and stable for discharge with outpatient follow-up and return precautions.    Problems Addressed:  Acute nonintractable headache, unspecified headache type: acute illness or injury that poses a threat to life or bodily functions    Amount and/or Complexity of Data Reviewed  Radiology: ordered and independent interpretation performed. Decision-making details documented in ED Course.     Details: I personally reviewed the patient's CT brain images and noted no ICH        Condition upon leaving the department: Stable    Disposition and Plan     Clinical Impression:  1. Acute nonintractable headache, unspecified headache type        Disposition:  Discharge    Follow-up:  Ronaldo Foss DO  [address]  [address]  [facility] [address]  [phone]    Schedule an appointment as soon as possible for a visit in 3 day(s)        Medications  Prescribed:  Discharge Medication List as of 5/28/2024 10:10 PM

## 2024-05-29 NOTE — ED PROVIDER NOTES
Patient Seen in: [facility]      History     Chief Complaint   Patient presents with    Headache     Stated Complaint: headache, bp high    Subjective:   HPI    Patient is a 64-year-old female who presents to immediate care due to headache that began prior to arrival.  Patient also took her blood pressure at home that was 190/90.  Denies history of headaches or migraines.  Denies treatment prior to arrival.  Associated symptoms include flushing, nausea.  Patient was seen at this emergency department yesterday treated with Medrol Dosepak, Toradol.  States that she has received this medication previously for her chronic back pain denies prior side effects.  Currently denying chest pain shortness of breath.    Objective:   Past Medical History:    Hyperlipidemia     (normal spontaneous vaginal delivery) ([facility])    2              Past Surgical History:   Procedure Laterality Date    Appendectomy      Back surgery      low back surgery for herniated disc.     Laparoscopic cholecystectomy      Sinus surgery        nasal sinus surgery    Tonsillectomy  1967    and adenoidectomy                Social History     Socioeconomic History    Marital status:    Tobacco Use    Smoking status: Every Day     Current packs/day: 0.25     Average packs/day: 0.3 packs/day for 32.0 years (8.0 ttl pk-yrs)     Types: Cigarettes    Smokeless tobacco: Never    Tobacco comments:     5 cigarettes/day   Vaping Use    Vaping status: Never Used   Substance and Sexual Activity    Alcohol use: No     Alcohol/week: 0.0 standard drinks of alcohol    Drug use: No    Sexual activity: Never   Other Topics Concern    Caffeine Concern Yes     Comment: Coffee: 2-3 daily    Exercise Yes     Social Determinants of Health     Food Insecurity: Low Risk  (10/28/2022)    [facility]    Food Insecurity     Have there been times that your food ran out, and  you didn't have money to get more?: No     Are there times that you worry that this might happen?: No   Transportation Needs: Low Risk  (10/28/2022)    Received from [facility]    Transportation Needs     Do you have trouble getting transportation to medical appointments?: No   Housing Stability: Low Risk  (10/28/2022)    Received from [facility]    Housing Stability     Are you concerned about having a safe and reliable place to live?: No              Review of Systems    Positive for stated complaint: headache, bp high  Other systems are as noted in HPI.  Constitutional and vital signs reviewed.      All other systems reviewed and negative except as noted above.    Physical Exam     ED Triage Vitals [05/28/24 1904]   /84   Pulse 94   Resp 20   Temp 98.1 °F (36.7 °C)   Temp src Temporal   SpO2 100 %   O2 Device None (Room air)       Current Vitals:   Vital Signs  BP: 149/84  Pulse: 94  Resp: 20  Temp: 98.1 °F (36.7 °C)  Temp src: Temporal    Oxygen Therapy  SpO2: 100 %  O2 Device: None (Room air)            Physical Exam    Vital signs reviewed. Nursing note reviewed.  Constitutional: Well-developed. Well-nourished. In no acute distress  HENT: Mucous membranes moist.   EYES: PERRL. EOMI. Visual fields in tact. Visual acuity grossly normal  NECK: Supple.   CARDIAC: Normal rate. Normal S1/ S2. 2+ distal pulses. No edema  PULM/CHEST: Clear to auscultation bilaterally. No wheezes  ABD: Soft, non-tender, non-distended  : No CVA tenderness.  RECTAL: deferred  Extremities: Full ROM  NEURO: Alert. CN II-XII intact. 5/5 strength in the upper and lower extremities. Sensation intact to light touch. Normal finger to nose test. Normal gait.   SKIN: Warm and dry. No rash or lesions.  PSYCH: Normal judgment. Normal affect.              MDM      Patient is a 64-year-old female that presents to immediate care due  to headache that began prior to arrival.  Patient arrives with stable vitals sitting comfortably.  Patient physical exam neurovascularly intact.  Due to new onset headache with no prior history of headaches or migraines differential diagnosis include hypertensive emergency, ICH, intractable headache versus tension headache versus migraine.  Discussed with patient to proceed to nearest emergency department for further evaluation and management.  Patient agreeable to plan stating that her  will drive her to [place] emergency department at this time.  History given by patient and .  Care discussed with attending Dr. Becker.                                   Medical Decision Making      Disposition and Plan     Clinical Impression:  1. Acute intractable headache, unspecified headache type         Disposition:  Ic to ed  5/28/2024  7:29 pm    Follow-up:  No follow-up provider specified.        Medications Prescribed:  Discharge Medication List as of 5/28/2024  7:28 PM

## 2024-05-29 NOTE — TELEPHONE ENCOUNTER
I called patient she states she is now at office visit with Dr. Romero. No need for Triaging patient at this time as she was evaluated in office by Dr. Romero.

## 2024-06-21 ENCOUNTER — HOSPITAL ENCOUNTER (OUTPATIENT)
Age: 64
Discharge: HOME | End: 2024-06-21

## 2024-06-21 VITALS
TEMPERATURE: 97 F | DIASTOLIC BLOOD PRESSURE: 81 MMHG | RESPIRATION RATE: 18 BRPM | HEART RATE: 85 BPM | SYSTOLIC BLOOD PRESSURE: 127 MMHG | OXYGEN SATURATION: 98 %

## 2024-06-21 DIAGNOSIS — B34.9 VIRAL ILLNESS: Primary | ICD-10-CM

## 2024-06-21 LAB
S PYO AG THROAT QL: NEGATIVE
SARS-COV-2 RNA RESP QL NAA+PROBE: NOT DETECTED

## 2024-06-21 PROCEDURE — 99213 OFFICE O/P EST LOW 20 MIN: CPT | Performed by: PHYSICIAN ASSISTANT

## 2024-06-21 PROCEDURE — U0002 COVID-19 LAB TEST NON-CDC: HCPCS | Performed by: PHYSICIAN ASSISTANT

## 2024-06-21 PROCEDURE — 87880 STREP A ASSAY W/OPTIC: CPT | Performed by: PHYSICIAN ASSISTANT

## 2024-06-21 RX ORDER — BENZOCAINE AND MENTHOL, UNSPECIFIED FORM 15; 2.3 MG/1; MG/1
1 LOZENGE ORAL AS NEEDED
Qty: 16 LOZENGE | Refills: 0 | Status: SHIPPED | OUTPATIENT
Start: 2024-06-21

## 2024-06-21 RX ORDER — IBUPROFEN 600 MG/1
600 TABLET ORAL EVERY 8 HOURS PRN
Qty: 30 TABLET | Refills: 0 | Status: SHIPPED | OUTPATIENT
Start: 2024-06-21

## 2024-06-21 RX ORDER — BENZONATATE 200 MG/1
200 CAPSULE ORAL 3 TIMES DAILY PRN
Qty: 20 CAPSULE | Refills: 0 | Status: SHIPPED | OUTPATIENT
Start: 2024-06-21

## 2024-06-21 NOTE — ED PROVIDER NOTES
Chief Complaint   Patient presents with    Ear Pain    Sore Throat     History obtained from: patient   services not used     HPI:     Jocelyne Bernabe is a 64 year old female who presents with general illness symptoms x 3 to 4 days.  Patient endorses sore throat, ear pain, nasal congestion, sinus pressure, dry cough, and chills.  Patient took OTC cold medicine and her 's leftover steroid and cough medicine from when he was recently sick with similar symptoms.  Denies fever, chest pain, shortness of breath, wheezing, productive cough, facial or neck swelling, drooling, voice change, neck stiffness, rash, abdominal pain, vomiting, dizziness, lightheadedness, syncope.    PMH  Past Medical History:    Diabetes (HCC)    Hyperlipidemia     (normal spontaneous vaginal delivery) (HCC)    2       PFSH    PFSH asessment screens reviewed and agree.  Nurses notes reviewed I agree with documentation.    Family History   Problem Relation Age of Onset    Heart Disorder Father     Schizophrenia Mother     Cancer Daughter         leukemia    Other (lymphoma) Brother     Breast Cancer Neg     Ovarian Cancer Neg      Family history reviewed with patient/caregiver and is not pertinent to presenting problem.  Social History     Socioeconomic History    Marital status:      Spouse name: Not on file    Number of children: Not on file    Years of education: Not on file    Highest education level: Not on file   Occupational History    Not on file   Tobacco Use    Smoking status: Every Day     Current packs/day: 0.25     Average packs/day: 0.3 packs/day for 32.0 years (8.0 ttl pk-yrs)     Types: Cigarettes    Smokeless tobacco: Never    Tobacco comments:     5 cigarettes/day   Vaping Use    Vaping status: Never Used   Substance and Sexual Activity    Alcohol use: No     Alcohol/week: 0.0 standard drinks of alcohol    Drug use: No    Sexual activity: Never   Other Topics Concern    Caffeine Concern Yes     Comment:  Coffee: 2-3 daily    Exercise Yes    Seat Belt Not Asked    Special Diet Not Asked    Stress Concern Not Asked    Weight Concern Not Asked     Service Not Asked    Blood Transfusions Not Asked    Occupational Exposure Not Asked    Hobby Hazards Not Asked    Sleep Concern Not Asked    Back Care Not Asked    Bike Helmet Not Asked    Self-Exams Not Asked   Social History Narrative    Not on file     Social Determinants of Health     Financial Resource Strain: Not on file   Food Insecurity: Low Risk  (10/28/2022)    Received from [facility]    Food Insecurity     Have there been times that your food ran out, and you didn't have money to get more?: No     Are there times that you worry that this might happen?: No   Transportation Needs: Low Risk  (10/28/2022)    Received from [facility]    Transportation Needs     Do you have trouble getting transportation to medical appointments?: No     How do you normally get to and from your appointments?: Not on file   Physical Activity: Not on file   Stress: Not on file   Social Connections: Not on file   Housing Stability: Low Risk  (10/28/2022)    Received from [facility]    Housing Stability     Are you concerned about having a safe and reliable place to live?: No         ROS:   Positive for stated complaint: Sore throat, ear pain, nasal congestion, sinus pressure, dry cough, chills  All other systems reviewed and negative except as noted above.  Constitutional and Vital Signs Reviewed.    Physical Exam:     Findings:    /81   Pulse 85   Temp 97.1 °F (36.2 °C) (Temporal)   Resp 18   SpO2 98%   GENERAL: well developed, no acute distress, non-toxic appearing   SKIN: good skin turgor, no obvious rashes  HEAD: normocephalic, atraumatic  EYES: sclera non-icteric bilaterally, conjunctiva clear  bilaterally  EARS: canals clear bilaterally, TMs clear bilaterally  NOSE: nasal congestion, minimal maxillary sinus tenderness bilaterally  OROPHARYNX: MMM, pharynx erythematous, tonsils surgically absent,, no exudates or swelling, uvula midline, no tongue elevation, maintaining airway and secretions  NECK: supple, no lymphadenopathy, no nuchal rigidity, no trismus, no edema, phonation normal    CARDIO: RRR, normal heart sounds   LUNGS: clear to auscultation bilaterally, no increased WOB, no rales, rhonchi, or wheezes, occasional coughing  EXTREMITIES: no cyanosis or edema, ELIAS without difficulty  NEURO: no focal deficits  PSYCH: alert and oriented x3, answering questions appropriately, mood appropriate    MDM/Assessment/Plan:   Orders for this encounter:    Orders Placed This Encounter    POCT Rapid Strep    Rapid SARS-CoV-2 by PCR     Order Specific Question:   Release to patient     Answer:   Immediate    benzonatate 200 MG Oral Cap     Sig: Take 1 capsule (200 mg total) by mouth 3 (three) times daily as needed for cough.     Dispense:  20 capsule     Refill:  0    Benzocaine-Menthol (CEPACOL) 15-2.3 MG Mouth/Throat Lozenge     Sig: Use as directed 1 lozenge in the mouth or throat as needed (sore throat).     Dispense:  16 lozenge     Refill:  0    ibuprofen 600 MG Oral Tab     Sig: Take 1 tablet (600 mg total) by mouth every 8 (eight) hours as needed for Pain or Fever.     Dispense:  30 tablet     Refill:  0       Labs performed this visit:  Recent Results (from the past 10 hour(s))   Rapid SARS-CoV-2 by PCR    Collection Time: 06/21/24  8:11 AM    Specimen: Nares; Other   Result Value Ref Range    Rapid SARS-CoV-2 by PCR Not Detected Not Detected   POCT Rapid Strep    Collection Time: 06/21/24  8:20 AM   Result Value Ref Range    POCT Rapid Strep Negative Negative       Imaging performed this visit:  No orders to display       Medical Decision Making  DDx includes viral URI versus COVID versus strep pharyngitis  versus viral pharyngitis versus viral sinusitis versus bronchitis versus other.  Patient is overall very well-appearing with stable vitals and tolerating oral intake.  No hypoxia or signs of respiratory distress.  No chest pain or shortness of breath.  COVID and strep test negative.  Offered chest x-ray which patient declines.  Given constellation of symptoms and symptom onset 3 to 4 days ago and  recently sick with similar symptoms, suspect viral illness.  Discussed supportive care including rest, increase fluid intake, and OTC Tylenol as needed for pain or fevers.  Rx Tessalon for cough as needed.  Rx Cepacol lozenges for sore throat as needed.  Rx ibuprofen per patient request for pain.  Instructed patient to go directly to nearest ER with any worsening or concerning symptoms.  Follow-up with PCP.  Work note provided.    Amount and/or Complexity of Data Reviewed  Labs: ordered.    Risk  OTC drugs.  Prescription drug management.          Diagnosis:    ICD-10-CM    1. Viral illness  B34.9           All results reviewed and discussed with patient/patient's family. Patient/patient's family verbalize excellent understanding of instructions and feels comfortable with plan. All of patient's/patient's family's questions were addressed.   See AVS for detailed discharge instructions for your condition today.    Follow Up with:  Ronaldo Foss DO  [address] [address]  [phone]            Note: This document was dictated using Dragon medical dictation software.  Proofreading was performed to the best of my ability, but errors may be present.    Renu Jimenez PA-C

## 2024-06-28 ENCOUNTER — OFFICE VISIT (OUTPATIENT)
Dept: FAMILY MEDICINE CLINIC | Facility: CLINIC | Age: 64
End: 2024-06-28

## 2024-06-28 VITALS
BODY MASS INDEX: 29.25 KG/M2 | HEART RATE: 96 BPM | DIASTOLIC BLOOD PRESSURE: 88 MMHG | SYSTOLIC BLOOD PRESSURE: 128 MMHG | TEMPERATURE: 97 F | HEIGHT: 66 IN | WEIGHT: 182 LBS

## 2024-06-28 DIAGNOSIS — F41.9 ANXIETY: ICD-10-CM

## 2024-06-28 DIAGNOSIS — I10 ESSENTIAL HYPERTENSION: ICD-10-CM

## 2024-06-28 DIAGNOSIS — E11.9 TYPE 2 DIABETES MELLITUS WITHOUT COMPLICATION, WITHOUT LONG-TERM CURRENT USE OF INSULIN (HCC): Primary | ICD-10-CM

## 2024-06-28 DIAGNOSIS — R23.2 HOT FLASHES: ICD-10-CM

## 2024-06-28 DIAGNOSIS — Z72.0 TOBACCO USE: ICD-10-CM

## 2024-06-28 DIAGNOSIS — E78.2 MIXED HYPERLIPIDEMIA: ICD-10-CM

## 2024-06-28 PROCEDURE — 99214 OFFICE O/P EST MOD 30 MIN: CPT | Performed by: FAMILY MEDICINE

## 2024-06-28 RX ORDER — LISINOPRIL 5 MG/1
5 TABLET ORAL DAILY
Qty: 90 TABLET | Refills: 1 | Status: SHIPPED | OUTPATIENT
Start: 2024-06-28

## 2024-06-28 NOTE — PROGRESS NOTES
Patient ID: Jocelyne Bernabe is a 64 year old female.    HPI  Chief Complaint   Patient presents with    Back Pain    Medication Follow-Up     Metformin     Blood Pressure     She saw Dr. Romero on 2024.  Hemoglobin A1c was mildly high at 6.7 on 4/15/2024. she is on 1 metformin daily.  She states her blood pressures seem to be high at home.    She is on Pamelor and takes 3 tablets at nighttime and states that helps her sleep wonderfully.    She states since last year she started getting hot flashes but it is during the day when she starts becoming active.  It is not during bedtime and she does not wake up in a puddle of sweat.    Wt Readings from Last 6 Encounters:   24 182 lb (82.6 kg)   24 181 lb 6.4 oz (82.3 kg)   24 178 lb (80.7 kg)   24 181 lb 9.6 oz (82.4 kg)   23 180 lb (81.6 kg)   10/27/23 175 lb (79.4 kg)       BMI Readings from Last 6 Encounters:   24 29.38 kg/m²   24 29.28 kg/m²   24 28.73 kg/m²   24 29.31 kg/m²   23 29.05 kg/m²   10/27/23 28.25 kg/m²       BP Readings from Last 6 Encounters:   24 146/85   24 127/81   24 113/73   24 144/80   24 149/84   24 118/53         Review of Systems  There is no chest pain or shortness of breath.  Otherwise as per HPI.      Medical History:      Past Medical History:    Diabetes (HCC)    Hyperlipidemia     (normal spontaneous vaginal delivery) (HCC)    2       Past Surgical History:   Procedure Laterality Date    Appendectomy      Back surgery      low back surgery for herniated disc.     Laparoscopic cholecystectomy      Sinus surgery        nasal sinus surgery    Tonsillectomy  1967    and adenoidectomy          Current Outpatient Medications   Medication Sig Dispense Refill    Benzocaine-Menthol (CEPACOL) 15-2.3 MG Mouth/Throat Lozenge Use as directed 1 lozenge in the mouth or throat as needed (sore throat). 16 lozenge 0    ibuprofen 600 MG Oral  Tab Take 1 tablet (600 mg total) by mouth every 8 (eight) hours as needed for Pain or Fever. 30 tablet 0    diclofenac 50 MG Oral Tab EC Take 1 tablet (50 mg total) by mouth 3 (three) times daily. 90 tablet 0    Omeprazole 40 MG Oral Capsule Delayed Release Take 1 capsule (40 mg total) by mouth daily. 90 capsule 1    valACYclovir 500 MG Oral Tab Take 1 tablet (500 mg total) by mouth daily. 90 tablet 1    acyclovir 5 % External Ointment 1 application 3-4 times daily on the area of outbreak for 1 week. 30 g 1    rosuvastatin (CRESTOR) 20 MG Oral Tab Take 1 tablet (20 mg total) by mouth nightly. For cholesterol. 90 tablet 2    metFORMIN  MG Oral Tablet 24 Hr Take 1 tablet (500 mg total) by mouth daily. For elevated sugar 90 tablet 1    nortriptyline (PAMELOR) 10 MG Oral Cap Take 2 capsules at nighttime to help you sleep.  If after 2 to 3 weeks you are still having trouble sleeping, start taking 3 capsules at nighttime. 90 capsule 1    montelukast (SINGULAIR) 10 MG Oral Tab Take 1 tablet (10 mg total) by mouth nightly. 90 tablet 1    clobetasol 0.05 % External Cream Apply 1 Application  topically 2 (two) times daily. 45 g 0    azelastine 0.1 % Nasal Solution 2 sprays by Nasal route 2 (two) times daily. Squeeze nose after sprays and do not snort it back into throat. 3 each 1    benzonatate 200 MG Oral Cap Take 1 capsule (200 mg total) by mouth 3 (three) times daily as needed for cough. (Patient not taking: Reported on 6/28/2024) 20 capsule 0    benzonatate 200 MG Oral Cap Take 1 capsule (200 mg total) by mouth 3 (three) times daily as needed. (Patient not taking: Reported on 6/28/2024) 30 capsule 0     Allergies:  Allergies   Allergen Reactions    Shellfish Allergy SWELLING        Physical Exam:       Physical Exam  Blood pressure 146/85, pulse 96, temperature 96.7 °F (35.9 °C), height 5' 6\" (1.676 m), weight 182 lb (82.6 kg), not currently breastfeeding.  Physical Exam   Constitutional: Patient is oriented to  person, place, and time. Patient appears well-developed and well-nourished.   HENT:   Mouth/Throat: Mucous membranes are normal.   Neck: Normal range of motion. Neck supple. No thyromegaly   Cardiovascular: Normal rate, regular rhythm and normal heart sounds.    Pulmonary/Chest: Effort normal and breath sounds normal. No respiratory distress.   Abdominal: Normal appearance and bowel sounds are normal. There is    no tenderness.  There is no rigidity, no rebound, no guarding and negative  Rodgers's sign.   Neurological: Patient is alert and oriented to person, place, and time.   Skin: Skin is warm and dry.  No diaphoresis.  Psychiatric: Patient has a normal mood and affect.   Vitals reviewed.  Vitals:    06/28/24 1101 06/28/24 1124   BP: 146/85 128/88   Pulse: 96    Temp: 96.7 °F (35.9 °C)    Weight: 182 lb (82.6 kg)    Height: 5' 6\" (1.676 m)               Assessment/Plan:      Diagnoses and all orders for this visit:    Type 2 diabetes mellitus without complication, without long-term current use of insulin (HCC)  -     Hemoglobin A1C; Future  -     lisinopril 5 MG Oral Tab; Take 1 tablet (5 mg total) by mouth daily. For high blood pressure or kidney protection  Because of the diabetes and the mildly elevated blood pressure we are going to start her on a small dose of lisinopril.  She agrees.  She will do labs fasting.  Mixed hyperlipidemia  -     Lipid Panel; Future  -     AST (SGOT); Future  -     ALT(SGPT); Future  -     Assay, Thyroid Stim Hormone; Future  She is compliant with her cholesterol medication.  Tobacco use  Must try to continue to stop smoking.  Anxiety  Better especially if she gets good sleep and Pamelor is helping  Essential hypertension  -     lisinopril 5 MG Oral Tab; Take 1 tablet (5 mg total) by mouth daily. For high blood pressure or kidney protection  -     CBC With Differential With Platelet; Future    Hot flashes  -     CBC With Differential With Platelet; Future  -     Assay, Thyroid Stim  Hormone; Future  She is already had a CAT scan of the chest.  There is no lymphadenopathy.  This does not happen at nighttime.  I am going to await the labs.      Referrals (if applicable)  No orders of the defined types were placed in this encounter.        Follow up if symptoms persist.  Take medicine (if given) as prescribed.  Approach to treatment discussed and patient/family member understands and agrees to plan.     No follow-ups on file.        Ronaldo Foss,   6/28/2024

## 2024-07-02 ENCOUNTER — LAB ENCOUNTER (OUTPATIENT)
Dept: LAB | Age: 64
End: 2024-07-02
Attending: FAMILY MEDICINE

## 2024-07-02 DIAGNOSIS — E78.2 MIXED HYPERLIPIDEMIA: ICD-10-CM

## 2024-07-02 DIAGNOSIS — R23.2 HOT FLASHES: ICD-10-CM

## 2024-07-02 DIAGNOSIS — E11.9 TYPE 2 DIABETES MELLITUS WITHOUT COMPLICATION, WITHOUT LONG-TERM CURRENT USE OF INSULIN (HCC): ICD-10-CM

## 2024-07-02 DIAGNOSIS — I10 ESSENTIAL HYPERTENSION: ICD-10-CM

## 2024-07-02 LAB
ALT SERPL-CCNC: 38 U/L
AST SERPL-CCNC: 25 U/L (ref ?–34)
BASOPHILS # BLD AUTO: 0.07 X10(3) UL (ref 0–0.2)
BASOPHILS NFR BLD AUTO: 0.7 %
CHOLEST SERPL-MCNC: 200 MG/DL (ref ?–200)
DEPRECATED RDW RBC AUTO: 42 FL (ref 35.1–46.3)
EOSINOPHIL # BLD AUTO: 0.14 X10(3) UL (ref 0–0.7)
EOSINOPHIL NFR BLD AUTO: 1.3 %
ERYTHROCYTE [DISTWIDTH] IN BLOOD BY AUTOMATED COUNT: 13.4 % (ref 11–15)
EST. AVERAGE GLUCOSE BLD GHB EST-MCNC: 151 MG/DL (ref 68–126)
FASTING PATIENT LIPID ANSWER: YES
HBA1C MFR BLD: 6.9 % (ref ?–5.7)
HCT VFR BLD AUTO: 40 %
HDLC SERPL-MCNC: 49 MG/DL (ref 40–59)
HGB BLD-MCNC: 13.8 G/DL
IMM GRANULOCYTES # BLD AUTO: 0.04 X10(3) UL (ref 0–1)
IMM GRANULOCYTES NFR BLD: 0.4 %
LDLC SERPL CALC-MCNC: 99 MG/DL (ref ?–100)
LYMPHOCYTES # BLD AUTO: 3.75 X10(3) UL (ref 1–4)
LYMPHOCYTES NFR BLD AUTO: 34.9 %
MCH RBC QN AUTO: 29.8 PG (ref 26–34)
MCHC RBC AUTO-ENTMCNC: 34.5 G/DL (ref 31–37)
MCV RBC AUTO: 86.4 FL
MONOCYTES # BLD AUTO: 0.74 X10(3) UL (ref 0.1–1)
MONOCYTES NFR BLD AUTO: 6.9 %
NEUTROPHILS # BLD AUTO: 6 X10 (3) UL (ref 1.5–7.7)
NEUTROPHILS # BLD AUTO: 6 X10(3) UL (ref 1.5–7.7)
NEUTROPHILS NFR BLD AUTO: 55.8 %
NONHDLC SERPL-MCNC: 151 MG/DL (ref ?–130)
PLATELET # BLD AUTO: 292 10(3)UL (ref 150–450)
RBC # BLD AUTO: 4.63 X10(6)UL
TRIGL SERPL-MCNC: 309 MG/DL (ref 30–149)
TSI SER-ACNC: 2.13 MIU/ML (ref 0.55–4.78)
VLDLC SERPL CALC-MCNC: 52 MG/DL (ref 0–30)
WBC # BLD AUTO: 10.7 X10(3) UL (ref 4–11)

## 2024-07-02 PROCEDURE — 85025 COMPLETE CBC W/AUTO DIFF WBC: CPT

## 2024-07-02 PROCEDURE — 80061 LIPID PANEL: CPT

## 2024-07-02 PROCEDURE — 83036 HEMOGLOBIN GLYCOSYLATED A1C: CPT

## 2024-07-02 PROCEDURE — 36415 COLL VENOUS BLD VENIPUNCTURE: CPT

## 2024-07-02 PROCEDURE — 84443 ASSAY THYROID STIM HORMONE: CPT

## 2024-07-02 PROCEDURE — 84450 TRANSFERASE (AST) (SGOT): CPT

## 2024-07-02 PROCEDURE — 84460 ALANINE AMINO (ALT) (SGPT): CPT

## 2024-07-17 DIAGNOSIS — E78.2 MIXED HYPERLIPIDEMIA: ICD-10-CM

## 2024-07-17 DIAGNOSIS — R73.09 ELEVATED GLUCOSE: ICD-10-CM

## 2024-07-21 RX ORDER — METFORMIN HYDROCHLORIDE 500 MG/1
500 TABLET, EXTENDED RELEASE ORAL DAILY
Qty: 90 TABLET | Refills: 3 | Status: SHIPPED | OUTPATIENT
Start: 2024-07-21

## 2024-07-21 RX ORDER — ROSUVASTATIN CALCIUM 20 MG/1
20 TABLET, COATED ORAL NIGHTLY
Qty: 90 TABLET | Refills: 2 | Status: SHIPPED | OUTPATIENT
Start: 2024-07-21 | End: 2025-04-17

## 2024-07-21 NOTE — TELEPHONE ENCOUNTER
Refill passed per [facility] protocol.    Requested Prescriptions   Pending Prescriptions Disp Refills    rosuvastatin (CRESTOR) 20 MG Oral Tab 90 tablet 2     Sig: Take 1 tablet (20 mg total) by mouth nightly. For cholesterol.       Cholesterol Medication Protocol Passed - 7/17/2024  5:48 PM        Passed - ALT < 80     Lab Results   Component Value Date    ALT 38 07/02/2024             Passed - ALT resulted within past year        Passed - Lipid panel within past 12 months     Lab Results   Component Value Date    CHOLEST 200 (H) 07/02/2024    TRIG 309 (H) 07/02/2024    HDL 49 07/02/2024    LDL 99 07/02/2024    VLDL 52 (H) 07/02/2024    NONHDLC 151 (H) 07/02/2024             Passed - In person appointment or virtual visit in the past 12 mos or appointment in next 3 mos     Recent Outpatient Visits              3 weeks ago Type 2 diabetes mellitus without complication, without long-term current use of insulin (HCC)    [facility] Ronaldo Foss DO    Office Visit    1 month ago Primary osteoarthritis involving multiple joints    [facility] [address], [place] Diana Drake MD    Office Visit    2 months ago Adult general medical exam    [facility] Ronaldo Foss DO    Office Visit    7 months ago Mixed hyperlipidemia    [facility] Ronaldo Foss,     Office Visit    10 months ago Screening for cervical cancer    [facility] - OB/GYN Leela Bo PA-C    Office Visit          Future Appointments         Provider Department Appt Notes    In 4 days Behar, Alex, MD [facility] Primary osteoarthritis involving multiple joints -    In 2 weeks Ronaldo Foss DO [facility] one month f/u BP see comm                       metFORMIN  MG Oral Tablet 24 Hr 90 tablet 1     Sig: Take 1 tablet (500 mg total) by mouth daily. For elevated sugar       Diabetes Medication Protocol Passed - 7/17/2024  5:48 PM        Passed - Last A1C < 7.5 and within past 6 months     Lab Results   Component Value Date    A1C 6.9 (H) 07/02/2024             Passed - In person appointment or virtual visit in the past 6 mos or appointment in next 3 mos     Recent Outpatient Visits              3 weeks ago Type 2 diabetes mellitus without complication, without long-term current use of insulin ([facility])    [facility] Ronaldo Foss,     Office Visit    1 month ago Primary osteoarthritis involving multiple joints    [facility], [address], [place] Diana Drake MD    Office Visit    2 months ago Adult general medical exam    [facility] Ronaldo Foss,     Office Visit    7 months ago Mixed hyperlipidemia    [facility] Ronaldo Foss,     Office Visit    10 months ago Screening for cervical cancer    [facility] - OB/GYN Leela Bo PA-C    Office Visit          Future Appointments         Provider Department Appt Notes    In 4 days Behar, Alex, MD [facility] Primary osteoarthritis involving multiple joints -    In 2 weeks Ronaldo Foss DO [facility] one month f/u BP see comm                    Passed - Microalbumin procedure in past 12 months or taking ACE/ARB        Passed - EGFRCR or GFRNAA > 50     GFR Evaluation  EGFRCR: 80 , resulted on 5/29/2024          Passed - GFR in the past 12 months             Recent Outpatient Visits              3 weeks ago Type 2 diabetes mellitus without complication, without long-term current use of insulin ([facility])    [facility]  [facility], [facility] Ronaldo Foss,     Office Visit    1 month ago Primary osteoarthritis involving multiple joints    [facility], [address], [place] Diana Drake MD    Office Visit    2 months ago Adult general medical exam    [facility] Ronaldo Foss,     Office Visit    7 months ago Mixed hyperlipidemia    [facility] Ronaldo Foss,     Office Visit    10 months ago Screening for cervical cancer    [facility] - OB/GYN Leela Bo PA-C    Office Visit            Future Appointments         Provider Department Appt Notes    In 4 days Behar, Alex, MD [facility] Primary osteoarthritis involving multiple joints -    In 2 weeks Ronaldo Foss DO [facility] one month f/u BP see comm

## 2024-08-29 DIAGNOSIS — F41.9 ANXIETY: ICD-10-CM

## 2024-08-29 DIAGNOSIS — F51.04 PSYCHOPHYSIOLOGICAL INSOMNIA: ICD-10-CM

## 2024-08-29 DIAGNOSIS — J30.89 OTHER ALLERGIC RHINITIS: ICD-10-CM

## 2024-08-30 RX ORDER — MONTELUKAST SODIUM 10 MG/1
10 TABLET ORAL NIGHTLY
Qty: 90 TABLET | Refills: 1 | OUTPATIENT
Start: 2024-08-30 | End: 2025-02-26

## 2024-08-30 NOTE — TELEPHONE ENCOUNTER
Please review; protocol failed/No Protocol    Last Office Visit: 06/28/2024    Requested Prescriptions   Pending Prescriptions Disp Refills    nortriptyline (PAMELOR) 10 MG Oral Cap 90 capsule 1     Sig: Take 2 capsules at nighttime to help you sleep.  If after 2 to 3 weeks you are still having trouble sleeping, start taking 3 capsules at nighttime.       There is no refill protocol information for this order      Refused Prescriptions Disp Refills    montelukast (SINGULAIR) 10 MG Oral Tab 90 tablet 1     Sig: Take 1 tablet (10 mg total) by mouth nightly.       Asthma & COPD Medication Protocol Failed - 8/29/2024  9:05 AM        Failed - Asthma Action Score greater than or equal to 20        Failed - AAP/ACT given in last 12 months     No data recorded  No data recorded  No data recorded  No data recorded          Passed - Appointment in past 6 or next 3 months      Recent Outpatient Visits              2 months ago Type 2 diabetes mellitus without complication, without long-term current use of insulin (HCC)    [facility] Ronaldo Foss,     Office Visit    3 months ago Primary osteoarthritis involving multiple joints    [facility], [address], [place] Diana Drake MD    Office Visit    4 months ago Adult general medical exam    [facility] Ronaldo Foss DO    Office Visit    8 months ago Mixed hyperlipidemia    [facility] Ronaldo Foss DO    Office Visit    1 year ago Screening for cervical cancer    [facility] - OB/GYN Leela Bo PA-C    Office Visit          Future Appointments         Provider Department Appt Notes    In 2 weeks Ronaldo Foss DO [facility] [place] sinus                       Future Appointments         Provider Department Appt Notes    In 2  Ronaldo Grace DO [facility] [place] sinus          Recent Outpatient Visits              2 months ago Type 2 diabetes mellitus without complication, without long-term current use of insulin (HCC)    [facility]     [facility]    3 months ago Primary osteoarthritis involving multiple joints    [facility], [address], [place] Diana Drake MD    Office Visit    4 months ago [facility] general medical [facility]     [facility]    8 months ago [facility] hyper[facility]     [facility]    1 year ago Screening for cervical cancer    [facility] [place] - OB/GYN Leela Bo PA-C    Office Visit

## 2024-08-31 RX ORDER — NORTRIPTYLINE HCL 10 MG
CAPSULE ORAL
Qty: 90 CAPSULE | Refills: 1 | Status: SHIPPED | OUTPATIENT
Start: 2024-08-31

## 2024-09-09 ENCOUNTER — OFFICE VISIT (OUTPATIENT)
Dept: FAMILY MEDICINE CLINIC | Facility: CLINIC | Age: 64
End: 2024-09-09

## 2024-09-09 VITALS
DIASTOLIC BLOOD PRESSURE: 73 MMHG | SYSTOLIC BLOOD PRESSURE: 105 MMHG | HEART RATE: 94 BPM | BODY MASS INDEX: 28.13 KG/M2 | TEMPERATURE: 96 F | HEIGHT: 66 IN | WEIGHT: 175 LBS

## 2024-09-09 DIAGNOSIS — Z12.83 SKIN CANCER SCREENING: ICD-10-CM

## 2024-09-09 DIAGNOSIS — R39.11 URINARY HESITANCY: ICD-10-CM

## 2024-09-09 DIAGNOSIS — I10 ESSENTIAL HYPERTENSION: ICD-10-CM

## 2024-09-09 DIAGNOSIS — R39.12 WEAK URINARY STREAM: ICD-10-CM

## 2024-09-09 DIAGNOSIS — E11.9 TYPE 2 DIABETES MELLITUS WITHOUT COMPLICATION, WITHOUT LONG-TERM CURRENT USE OF INSULIN (HCC): ICD-10-CM

## 2024-09-09 DIAGNOSIS — J30.89 OTHER ALLERGIC RHINITIS: Primary | ICD-10-CM

## 2024-09-09 RX ORDER — LISINOPRIL 5 MG/1
5 TABLET ORAL DAILY
Qty: 90 TABLET | Refills: 1 | Status: SHIPPED | OUTPATIENT
Start: 2024-09-09

## 2024-09-09 RX ORDER — AZELASTINE 1 MG/ML
2 SPRAY, METERED NASAL 2 TIMES DAILY
Qty: 3 EACH | Refills: 1 | Status: SHIPPED | OUTPATIENT
Start: 2024-09-09

## 2024-09-09 RX ORDER — LEVOCETIRIZINE DIHYDROCHLORIDE 5 MG/1
5 TABLET, FILM COATED ORAL EVERY MORNING
Qty: 90 TABLET | Refills: 1 | Status: SHIPPED | OUTPATIENT
Start: 2024-09-09

## 2024-09-09 RX ORDER — MONTELUKAST SODIUM 10 MG/1
10 TABLET ORAL NIGHTLY
Qty: 90 TABLET | Refills: 1 | Status: SHIPPED | OUTPATIENT
Start: 2024-09-09 | End: 2025-03-08

## 2024-09-09 NOTE — PROGRESS NOTES
Patient ID: Jocelyne Bernabe is a 64 year old female.    HPI  Chief Complaint   Patient presents with    Sinus Problem     Patient has a cough, started x1 month ago and tickles at night no mucus and stuffy nose      She is here with her  who I met for the first time today.  She is just on 1 metformin in the morning and doing very well with it.  She has no hypoglycemia or diaphoresis.  She wanted to know if she needs to take more but I explained that she just had a hemoglobin A1c in July which was controlled and is too early to check it again.    She states for over a month now she has had a dry cough due to a tickle in the throat.  She has been sneezing a lot at night and in the morning and feels postnasal drip and then has to clear her throat quite often.  She just restarted the Astelin nasal spray that she had at home but she is running out and needs a refill along with the montelukast.  She states the allergies have been terrible.    She also states that she has been having urinary hesitancy for some time now and then when she does urinate it comes out slow and in many directions instead of 1 single stream      Wt Readings from Last 6 Encounters:   09/09/24 175 lb (79.4 kg)   06/28/24 182 lb (82.6 kg)   05/29/24 181 lb 6.4 oz (82.3 kg)   05/28/24 178 lb (80.7 kg)   04/25/24 181 lb 9.6 oz (82.4 kg)   12/18/23 180 lb (81.6 kg)       BMI Readings from Last 6 Encounters:   09/09/24 28.25 kg/m²   06/28/24 29.38 kg/m²   05/29/24 29.28 kg/m²   05/28/24 28.73 kg/m²   04/25/24 29.31 kg/m²   12/18/23 29.05 kg/m²       BP Readings from Last 6 Encounters:   09/09/24 105/73   06/28/24 128/88   06/21/24 127/81   05/29/24 113/73   05/28/24 144/80   05/28/24 149/84         Review of Systems   HENT:  Positive for congestion, postnasal drip, rhinorrhea and sneezing.    Respiratory:  Positive for cough. Negative for shortness of breath.    Cardiovascular:  Negative for chest pain.           Medical History:      Past Medical  History:    Diabetes (HCC)    Hyperlipidemia     (normal spontaneous vaginal delivery) (HCC)    2       Past Surgical History:   Procedure Laterality Date    Appendectomy      Back surgery      low back surgery for herniated disc.     Laparoscopic cholecystectomy      Sinus surgery        nasal sinus surgery    Tonsillectomy      and adenoidectomy          Current Outpatient Medications   Medication Sig Dispense Refill    nortriptyline (PAMELOR) 10 MG Oral Cap Take 2 capsules at nighttime to help you sleep.  If after 2 to 3 weeks you are still having trouble sleeping, start taking 3 capsules at nighttime. 90 capsule 1    rosuvastatin (CRESTOR) 20 MG Oral Tab Take 1 tablet (20 mg total) by mouth nightly. For cholesterol. 90 tablet 2    metFORMIN  MG Oral Tablet 24 Hr Take 1 tablet (500 mg total) by mouth daily. For elevated sugar 90 tablet 3    lisinopril 5 MG Oral Tab Take 1 tablet (5 mg total) by mouth daily. For high blood pressure or kidney protection 90 tablet 1    ibuprofen 600 MG Oral Tab Take 1 tablet (600 mg total) by mouth every 8 (eight) hours as needed for Pain or Fever. 30 tablet 0    Omeprazole 40 MG Oral Capsule Delayed Release Take 1 capsule (40 mg total) by mouth daily. 90 capsule 1    valACYclovir 500 MG Oral Tab Take 1 tablet (500 mg total) by mouth daily. 90 tablet 1    acyclovir 5 % External Ointment 1 application 3-4 times daily on the area of outbreak for 1 week. 30 g 1    montelukast (SINGULAIR) 10 MG Oral Tab Take 1 tablet (10 mg total) by mouth nightly. 90 tablet 1    clobetasol 0.05 % External Cream Apply 1 Application  topically 2 (two) times daily. 45 g 0    azelastine 0.1 % Nasal Solution 2 sprays by Nasal route 2 (two) times daily. Squeeze nose after sprays and do not snort it back into throat. 3 each 1    benzonatate 200 MG Oral Cap Take 1 capsule (200 mg total) by mouth 3 (three) times daily as needed for cough. (Patient not taking: Reported on 2024)  20 capsule 0    Benzocaine-Menthol (CEPACOL) 15-2.3 MG Mouth/Throat Lozenge Use as directed 1 lozenge in the mouth or throat as needed (sore throat). (Patient not taking: Reported on 9/9/2024) 16 lozenge 0    benzonatate 200 MG Oral Cap Take 1 capsule (200 mg total) by mouth 3 (three) times daily as needed. (Patient not taking: Reported on 6/28/2024) 30 capsule 0     Allergies:  Allergies   Allergen Reactions    Shellfish Allergy SWELLING        Physical Exam:       Physical Exam  Blood pressure 105/73, pulse 94, temperature (!) 96.3 °F (35.7 °C), temperature source Temporal, height 5' 6\" (1.676 m), weight 175 lb (79.4 kg), not currently breastfeeding.  Physical Exam   Constitutional: Patient is oriented to person, place, and time. Patient appears well-developed and well-nourished. No distress.   HENT:   Head: Normocephalic.   Right Ear: Tympanic membrane normal.   Left Ear: Tympanic membrane normal.   Nose: Mucosal edema and rhinorrhea present. No sinus tenderness   Nose: Pale boggy nasal mucosa with clear rhinorrhea.  Turbinates: moderate congestion.  Oropharynx: clear post nasal drainage with cobblestoning.  Tonsils:  absent.   Eyes: Conjunctivae and EOM are normal.   Neck: Neck supple. No thyromegaly present.   Cardiovascular: Normal rate, regular rhythm and normal heart sounds.    Pulmonary/Chest: Effort normal and breath sounds normal. No respiratory distress.   Lymphadenopathy:     Has  no cervical adenopathy.   Neurological: Is alert and oriented to person, place, and time.   Skin: Skin is warm.  She does have solar lentigos and sun damaged skin.  Psychiatric: has a normal mood and affect.   Vitals reviewed.           Assessment/Plan:      Diagnoses and all orders for this visit:    Other allergic rhinitis  -     montelukast (SINGULAIR) 10 MG Oral Tab; Take 1 tablet (10 mg total) by mouth nightly.  -     levocetirizine 5 MG Oral Tab; Take 1 tablet (5 mg total) by mouth every morning. For allergies.  -      azelastine 0.1 % Nasal Solution; 2 sprays by Nasal route 2 (two) times daily. Squeeze nose after sprays and do not snort it back into throat.  I explained that she can even take the Astelin twice a day and I refilled the montelukast to take at nighttime and then place her also on Xyzal in the morning as her allergies are terrible and causing this cough of hers.  Urinary hesitancy  -     UROLOGY - INTERNAL  Lets have her see urology  Weak urinary stream  -     UROLOGY - INTERNAL    Skin cancer screening  -     DERM - INTERNAL    Type 2 diabetes mellitus without complication, without long-term current use of insulin (HCC)  -     lisinopril 5 MG Oral Tab; Take 1 tablet (5 mg total) by mouth daily. For high blood pressure or kidney protection  Still doing very well and will continue the metformin and the small dose of lisinopril for blood pressure and kidney protection.  Essential hypertension  -     lisinopril 5 MG Oral Tab; Take 1 tablet (5 mg total) by mouth daily. For high blood pressure or kidney protection        Referrals (if applicable)  Orders Placed This Encounter   Procedures    UROLOGY - INTERNAL     Referral Priority:   Routine     Referral Type:   OFFICE VISIT     Referred to Provider:   Radha Damon MD     Requested Specialty:   UROLOGY     Number of Visits Requested:   3    DERM - INTERNAL     SKIN DOCTOR    Call [phone].     Referral Priority:   Routine     Referral Type:   OFFICE VISIT     Referred to Provider:   Socorro Sanchez MD     Requested Specialty:   DERMATOLOGY     Number of Visits Requested:   3         Follow up if symptoms persist.  Take medicine (if given) as prescribed.  Approach to treatment discussed and patient/family member understands and agrees to plan.     Return in about 6 weeks (around 10/21/2024) for Diabetes Followup.        Ronaldo Foss DO  9/9/2024

## 2024-11-25 ENCOUNTER — APPOINTMENT (OUTPATIENT)
Dept: GENERAL RADIOLOGY | Age: 64
End: 2024-11-25
Attending: PHYSICIAN ASSISTANT
Payer: COMMERCIAL

## 2024-11-25 ENCOUNTER — HOSPITAL ENCOUNTER (OUTPATIENT)
Age: 64
Discharge: HOME | End: 2024-11-25
Payer: COMMERCIAL

## 2024-11-25 VITALS
RESPIRATION RATE: 18 BRPM | OXYGEN SATURATION: 100 % | DIASTOLIC BLOOD PRESSURE: 60 MMHG | HEART RATE: 93 BPM | TEMPERATURE: 99 F | SYSTOLIC BLOOD PRESSURE: 123 MMHG

## 2024-11-25 DIAGNOSIS — R05.9 COUGH: Primary | ICD-10-CM

## 2024-11-25 DIAGNOSIS — J06.9 VIRAL URI WITH COUGH: ICD-10-CM

## 2024-11-25 PROCEDURE — 99213 OFFICE O/P EST LOW 20 MIN: CPT | Performed by: PHYSICIAN ASSISTANT

## 2024-11-25 PROCEDURE — 71046 X-RAY EXAM CHEST 2 VIEWS: CPT | Performed by: PHYSICIAN ASSISTANT

## 2024-11-25 NOTE — ED PROVIDER NOTES
Patient Seen in: [facility]      History     Chief Complaint   Patient presents with    Cough     Stated Complaint: Cough; Cant Sleep; Feverish    Subjective:   HPI      Patient is a 64-year-old female with past medical history of diabetes hyperlipidemia that presents to immediate care due to x 2 days.  Patient states that she developed sinus congestion sore throat and cough over the past few days.  Patient states that she had a fever last night.  Has been taking over-the-counter Robitussin with mild relief.  Positive sick contacts at home recently diagnosed with pneumonia.    Objective:     Past Medical History:    Diabetes (HCC)    Hyperlipidemia     (normal spontaneous vaginal delivery) (HCC)    2              Past Surgical History:   Procedure Laterality Date    Appendectomy      Back surgery      low back surgery for herniated disc.     Laparoscopic cholecystectomy      Sinus surgery        nasal sinus surgery    Tonsillectomy      and adenoidectomy                Social History     Socioeconomic History    Marital status:    Tobacco Use    Smoking status: Every Day     Current packs/day: 0.25     Average packs/day: 0.3 packs/day for 32.0 years (8.0 ttl pk-yrs)     Types: Cigarettes    Smokeless tobacco: Never    Tobacco comments:     5 cigarettes/day   Vaping Use    Vaping status: Never Used   Substance and Sexual Activity    Alcohol use: No     Alcohol/week: 0.0 standard drinks of alcohol    Drug use: No    Sexual activity: Never   Other Topics Concern    Caffeine Concern Yes     Comment: Coffee: 2-3 daily    Exercise Yes     Social Drivers of Health     Food Insecurity: Low Risk  (10/28/2022)    [facility]    Food Insecurity     Have there been times that your food ran out, and you didn't have money to get more?: No     Are there times that you worry that this might happen?: No   Transportation  Needs: Low Risk  (10/28/2022)    Received from [facility]    Transportation Needs     Do you have trouble getting transportation to medical appointments?: No   Housing Stability: Low Risk  (10/28/2022)    Received from [facility]    Housing Stability     Are you concerned about having a safe and reliable place to live?: No              Review of Systems    Positive for stated complaint: Cough; Cant Sleep; Feverish  Other systems are as noted in HPI.  Constitutional and vital signs reviewed.      All other systems reviewed and negative except as noted above.    Physical Exam     ED Triage Vitals [11/25/24 1400]   /60   Pulse 93   Resp 18   Temp 98.9 °F (37.2 °C)   Temp src Oral   SpO2 100 %   O2 Device None (Room air)       Current Vitals:   Vital Signs  BP: 123/60  Pulse: 93  Resp: 18  Temp: 98.9 °F (37.2 °C)  Temp src: Oral    Oxygen Therapy  SpO2: 100 %  O2 Device: None (Room air)        Physical Exam  Vital signs reviewed. Nursing note reviewed.  Constitutional: Well-developed. Well-nourished. In no acute distress  HENT: Mucous membranes moist. TMs intact bilaterally. No trismus. Uvula midline. Mild posterior pharynx erythema.  No petechiae, exudates, or posterior pharynx edema.  EYES: No scleral icterus or conjunctival injection.  NECK: Full ROM. Supple.   CARDIAC: Normal rate. Normal S1/ S2. 2+ distal pulses. No edema  PULM/CHEST: Clear to auscultation bilaterally. No wheezes  Extremities: Full ROM  NEURO: Awake, alert, following commands, moving extremities, answering questions.   SKIN: Warm and dry. No rash or lesions.  PSYCH: Normal judgment. Normal affect.             MDM      Patient is a 64-year-old female who presents to immediate care due to cough x 2 days.  Patient arrives with stable vitals speaking complete sentences in no respiratory distress.  Physical exam unremarkable with lungs  clear to auscultation.  Most likely viral URI, acute cough, acute sinusitis.  Less likely pneumonia, chest x-ray performed in immediate care, personally reviewed by me showing no consolidation.  Discussed supportive treatment including Tylenol and ibuprofen as needed.  Antihistamine such as Claritin or Zyrtec and decongestant such as Sudafed.  Return precautions including worsening cough, fever chest pain shortness of breath.  History given by patient.  Patient agreeable to plan all questions answered.          Medical Decision Making      Disposition and Plan     Clinical Impression:  1. Cough    2. Viral URI with cough         Disposition:  Discharge  11/25/2024  2:44 pm    Follow-up:  Ronaldo Foss DO  [address] [place] [address]  [phone]    Call             Medications Prescribed:  Discharge Medication List as of 11/25/2024  2:49 PM              Supplementary Documentation:

## 2024-11-25 NOTE — ED INITIAL ASSESSMENT (HPI)
Intermittent cough over the last few months where her pcp prescribed her cough medications which she states has not been working. Patient states since Friday, cough is worse at night and had a reported fever today of 102. Patient reports family recently had pneumonia.

## 2024-11-27 ENCOUNTER — OFFICE VISIT (OUTPATIENT)
Dept: FAMILY MEDICINE CLINIC | Facility: CLINIC | Age: 64
End: 2024-11-27

## 2024-11-27 ENCOUNTER — NURSE TRIAGE (OUTPATIENT)
Dept: FAMILY MEDICINE CLINIC | Facility: CLINIC | Age: 64
End: 2024-11-27

## 2024-11-27 ENCOUNTER — HOSPITAL ENCOUNTER (OUTPATIENT)
Dept: GENERAL RADIOLOGY | Age: 64
Discharge: HOME | End: 2024-11-27
Attending: PHYSICIAN ASSISTANT
Payer: COMMERCIAL

## 2024-11-27 VITALS
BODY MASS INDEX: 27.97 KG/M2 | DIASTOLIC BLOOD PRESSURE: 80 MMHG | HEART RATE: 101 BPM | TEMPERATURE: 99 F | HEIGHT: 66 IN | WEIGHT: 174 LBS | SYSTOLIC BLOOD PRESSURE: 130 MMHG

## 2024-11-27 DIAGNOSIS — R06.02 SHORTNESS OF BREATH: ICD-10-CM

## 2024-11-27 DIAGNOSIS — R05.1 ACUTE COUGH: Primary | ICD-10-CM

## 2024-11-27 DIAGNOSIS — R05.1 ACUTE COUGH: ICD-10-CM

## 2024-11-27 DIAGNOSIS — L30.4 INTERTRIGO: ICD-10-CM

## 2024-11-27 DIAGNOSIS — J30.89 OTHER ALLERGIC RHINITIS: ICD-10-CM

## 2024-11-27 PROCEDURE — 71046 X-RAY EXAM CHEST 2 VIEWS: CPT | Performed by: PHYSICIAN ASSISTANT

## 2024-11-27 PROCEDURE — 99213 OFFICE O/P EST LOW 20 MIN: CPT | Performed by: PHYSICIAN ASSISTANT

## 2024-11-27 RX ORDER — MUPIROCIN 20 MG/G
OINTMENT TOPICAL
Qty: 30 G | Refills: 0 | Status: SHIPPED | OUTPATIENT
Start: 2024-11-27

## 2024-11-27 RX ORDER — AZITHROMYCIN 250 MG/1
TABLET, FILM COATED ORAL
Qty: 6 TABLET | Refills: 0 | Status: SHIPPED | OUTPATIENT
Start: 2024-11-27 | End: 2024-12-02

## 2024-11-27 RX ORDER — ALBUTEROL SULFATE 90 UG/1
2 INHALANT RESPIRATORY (INHALATION) EVERY 4 HOURS PRN
Qty: 18 G | Refills: 0 | Status: SHIPPED | OUTPATIENT
Start: 2024-11-27

## 2024-11-27 RX ORDER — BENZONATATE 200 MG/1
200 CAPSULE ORAL 3 TIMES DAILY PRN
Qty: 30 CAPSULE | Refills: 0 | Status: SHIPPED | OUTPATIENT
Start: 2024-11-27

## 2024-11-27 NOTE — TELEPHONE ENCOUNTER
Action Requested: Summary for Provider     []  Critical Lab, Recommendations Needed  [] Need Additional Advice  []   FYI    []   Need Orders  [] Need Medications Sent to Pharmacy  []  Other         Reason for call: Cough/URI  Onset- this week, worse, seen in UC, had xray, coughing makes her throw up, coughing on call, fever 100.3 , stated 103 at first , appt made                    Reason for Disposition   SEVERE coughing spells (e.g., whooping sound after coughing, vomiting after coughing)    Protocols used: Cough-A-OH

## 2024-11-27 NOTE — PROGRESS NOTES
HPI:     Cough  This is a new problem. The current episode started in the past 7 days. The problem has been gradually worsening. The cough is Productive of sputum. Associated symptoms include a fever, headaches and shortness of breath. Pertinent negatives include no chest pain, chills, ear pain, nasal congestion, postnasal drip, rash, rhinorrhea, sore throat or wheezing. She has tried OTC cough suppressant for the symptoms. The treatment provided no relief.       Medications:     Current Outpatient Medications   Medication Sig Dispense Refill    benzonatate 200 MG Oral Cap Take 1 capsule (200 mg total) by mouth 3 (three) times daily as needed. 30 capsule 0    mupirocin 2 % External Ointment Apply to affect lesions tid 30 g 0    azithromycin 250 MG Oral Tab Take 2 tablets (500 mg total) by mouth daily for 1 day, THEN 1 tablet (250 mg total) daily for 4 days. 6 tablet 0    albuterol 108 (90 Base) MCG/ACT Inhalation Aero Soln Inhale 2 puffs into the lungs every 4 (four) hours as needed for Wheezing or Shortness of Breath. 18 g 0    montelukast (SINGULAIR) 10 MG Oral Tab Take 1 tablet (10 mg total) by mouth nightly. 90 tablet 1    levocetirizine 5 MG Oral Tab Take 1 tablet (5 mg total) by mouth every morning. For allergies. 90 tablet 1    azelastine 0.1 % Nasal Solution 2 sprays by Nasal route 2 (two) times daily. Squeeze nose after sprays and do not snort it back into throat. 3 each 1    lisinopril 5 MG Oral Tab Take 1 tablet (5 mg total) by mouth daily. For high blood pressure or kidney protection 90 tablet 1    nortriptyline (PAMELOR) 10 MG Oral Cap Take 2 capsules at nighttime to help you sleep.  If after 2 to 3 weeks you are still having trouble sleeping, start taking 3 capsules at nighttime. 90 capsule 1    rosuvastatin (CRESTOR) 20 MG Oral Tab Take 1 tablet (20 mg total) by mouth nightly. For cholesterol. 90 tablet 2    metFORMIN  MG Oral Tablet 24 Hr Take 1 tablet (500 mg total) by mouth daily. For  elevated sugar 90 tablet 3    Benzocaine-Menthol (CEPACOL) 15-2.3 MG Mouth/Throat Lozenge Use as directed 1 lozenge in the mouth or throat as needed (sore throat). 16 lozenge 0    ibuprofen 600 MG Oral Tab Take 1 tablet (600 mg total) by mouth every 8 (eight) hours as needed for Pain or Fever. 30 tablet 0    Omeprazole 40 MG Oral Capsule Delayed Release Take 1 capsule (40 mg total) by mouth daily. 90 capsule 1    valACYclovir 500 MG Oral Tab Take 1 tablet (500 mg total) by mouth daily. 90 tablet 1    acyclovir 5 % External Ointment 1 application 3-4 times daily on the area of outbreak for 1 week. 30 g 1    clobetasol 0.05 % External Cream Apply 1 Application  topically 2 (two) times daily. 45 g 0    amoxicillin 500 MG Oral Cap Take 2 capsules (1,000 mg total) by mouth 3 (three) times daily for 7 days. 42 capsule 0       Allergies:   Allergies[1]    History:     Health Maintenance   Topic Date Due    Diabetes Care Dilated Eye Exam  Never done    Tobacco Cessation Counseling  Never done    COVID-19 Vaccine ( - - season) 2024    Influenza Vaccine (1) 10/01/2024    Mammogram  2024    Diabetes Care A1C  2025    DTaP,Tdap,and Td Vaccines (2 - Td or Tdap) 2025    Diabetes Care Foot Exam  2025    Annual Physical  2025    Diabetes Care: GFR  2025    Diabetes Care: Microalb/Creat Ratio  2025    Pap Smear  2026    Colorectal Cancer Screening  2027    Annual Depression Screening  Completed    Pneumococcal Vaccine: Birth to 64yrs  Completed    Zoster Vaccines  Completed       No LMP recorded. Patient is postmenopausal.   Past Medical History:     Past Medical History:    Diabetes (HCC)    Hyperlipidemia     (normal spontaneous vaginal delivery) (HCC)    2       Past Surgical History:     Past Surgical History:   Procedure Laterality Date    Appendectomy  1970    Back surgery      low back surgery for herniated disc.     Laparoscopic cholecystectomy       Sinus surgery    1998    nasal sinus surgery    Tonsillectomy  1967    and adenoidectomy       Family History:     Family History   Problem Relation Age of Onset    Heart Disorder Father     Schizophrenia Mother     Cancer Daughter         leukemia    Other (lymphoma) Brother     Breast Cancer Neg     Ovarian Cancer Neg        Social History:     Social History     Socioeconomic History    Marital status:      Spouse name: Not on file    Number of children: Not on file    Years of education: Not on file    Highest education level: Not on file   Occupational History    Not on file   Tobacco Use    Smoking status: Every Day     Current packs/day: 0.25     Average packs/day: 0.3 packs/day for 32.0 years (8.0 ttl pk-yrs)     Types: Cigarettes     Passive exposure: Current    Smokeless tobacco: Never    Tobacco comments:     5 cigarettes/day   Vaping Use    Vaping status: Never Used   Substance and Sexual Activity    Alcohol use: No     Alcohol/week: 0.0 standard drinks of alcohol    Drug use: No    Sexual activity: Never   Other Topics Concern    Caffeine Concern Yes     Comment: Coffee: 2-3 daily    Exercise Yes    Seat Belt Not Asked    Special Diet Not Asked    Stress Concern Not Asked    Weight Concern Not Asked     Service Not Asked    Blood Transfusions Not Asked    Occupational Exposure Not Asked    Hobby Hazards Not Asked    Sleep Concern Not Asked    Back Care Not Asked    Bike Helmet Not Asked    Self-Exams Not Asked   Social History Narrative    Not on file     Social Drivers of Health     Financial Resource Strain: Not on file   Food Insecurity: Low Risk  (10/28/2022)    [facility]    Food Insecurity     Have there been times that your food ran out, and you didn't have money to get more?: No     Are there times that you worry that this might happen?: No   Transportation Needs: Low Risk  (10/28/2022)    Received from  [facility]    Transportation Needs     Do you have trouble getting transportation to medical appointments?: No     How do you normally get to and from your appointments?: Not on file   Physical Activity: Not on file   Stress: Not on file   Social Connections: Not on file   Housing Stability: Low Risk  (10/28/2022)    Received from [facility]    Housing Stability     Are you concerned about having a safe and reliable place to live?: No       Review of Systems:   Review of Systems   Constitutional:  Positive for fever. Negative for activity change, chills and fatigue.   HENT:  Negative for congestion, ear discharge, ear pain, postnasal drip, rhinorrhea, sinus pressure, sinus pain and sore throat.    Respiratory:  Positive for cough, chest tightness and shortness of breath. Negative for wheezing.    Cardiovascular:  Negative for chest pain and palpitations.   Gastrointestinal:  Negative for abdominal distention, abdominal pain, blood in stool, constipation, diarrhea, nausea and vomiting.   Skin:  Negative for rash.   Neurological:  Positive for headaches.        Vitals:    11/27/24 0902   BP: 130/80   Pulse: 101   Temp: 98.7 °F (37.1 °C)   Weight: 174 lb (78.9 kg)   Height: 5' 6\" (1.676 m)     Body mass index is 28.08 kg/m².    Physical Exam:   Physical Exam  Vitals reviewed.   Constitutional:       General: She is not in acute distress.     Appearance: She is well-developed.   HENT:      Right Ear: Tympanic membrane, ear canal and external ear normal. There is no impacted cerumen.      Left Ear: Tympanic membrane, ear canal and external ear normal. There is no impacted cerumen.      Nose: Nose normal.      Mouth/Throat:      Mouth: Mucous membranes are moist.      Pharynx: Oropharynx is clear. No oropharyngeal exudate or posterior oropharyngeal erythema.   Eyes:      General:         Right eye: No discharge.          Left eye: No discharge.      Conjunctiva/sclera: Conjunctivae normal.   Cardiovascular:      Rate and Rhythm: Normal rate and regular rhythm.      Heart sounds: Normal heart sounds, S1 normal and S2 normal. No murmur heard.  Pulmonary:      Effort: Pulmonary effort is normal.      Breath sounds: Rales present. No wheezing.   Chest:      Chest wall: No tenderness.   Lymphadenopathy:      Cervical: No cervical adenopathy.   Skin:     Findings: Rash present.   Neurological:      Mental Status: She is alert and oriented to person, place, and time.   Psychiatric:         Behavior: Behavior is cooperative.      There is a small lesion on the right corner of the mouth- mild tenderness and scaly.    Assessment and Plan::     Problem List Items Addressed This Visit    None  Visit Diagnoses       Acute cough    -  Primary    Relevant Medications    benzonatate 200 MG Oral Cap    azithromycin 250 MG Oral Tab        Other Relevant Orders    XR CHEST PA + LAT CHEST (CPT=71046) (Completed)    Shortness of breath        Relevant Medications        albuterol 108 (90 Base) MCG/ACT Inhalation Aero Soln    Intertrigo        Relevant Medications    mupirocin 2 % External Ointment          Discussed plan of care with pt and pt is in agreement.All questions answered. Pt to call with questions or concerns.         [1]   Allergies  Allergen Reactions    Shellfish Allergy SWELLING

## 2024-11-28 PROBLEM — M79.605 LEFT LEG PAIN: Status: RESOLVED | Noted: 2018-12-11 | Resolved: 2024-11-28

## 2024-11-28 PROBLEM — H57.11 EYE PAIN, RIGHT: Status: RESOLVED | Noted: 2018-03-21 | Resolved: 2024-11-28

## 2024-11-29 ENCOUNTER — TELEPHONE (OUTPATIENT)
Dept: FAMILY MEDICINE CLINIC | Facility: CLINIC | Age: 64
End: 2024-11-29

## 2024-11-29 NOTE — TELEPHONE ENCOUNTER
Pt stated stated she has pneumonia requesting f/u appt Monday with her PCP, appt made, advised deep breathing exercises, explained how to use inhaler, advised if SOB gets worse go to ER, push fluids, eat Activia yogurt with Probiotic

## 2024-12-01 RX ORDER — MONTELUKAST SODIUM 10 MG/1
10 TABLET ORAL NIGHTLY
Qty: 90 TABLET | Refills: 1 | OUTPATIENT
Start: 2024-12-01 | End: 2025-05-30

## 2024-12-01 NOTE — TELEPHONE ENCOUNTER
Please review. Protocol failed / No Protocol.    Requested Prescriptions   Pending Prescriptions Disp Refills    montelukast (SINGULAIR) 10 MG Oral Tab 90 tablet 1     Sig: Take 1 tablet (10 mg total) by mouth nightly.       Asthma & COPD Medication Protocol Failed - 12/1/2024 10:53 AM        Failed - ACT Score greater than or equal to 20                Failed - ACT recorded in the last 12 months                Passed - Appointment in past 6 or next 3 months      Recent Outpatient Visits              4 days ago Acute cough    [facility], [address], [place] Josue Saez PA-C    Office Visit    2 months ago [facility]     [facility]    5 months ago Type 2 diabetes mellitus without complication, without long-term current use of insulin ([facility]     Office Visit    6 months ago Primary osteoarthritis involving multiple joints    [facility], [address], [place] Diana Drake MD    Office Visit    7 months ago Adult general medical exam    [facility]    [facility] Visit          Future Appointments         Provider Department Appt Notes    Tomorrow Ronaldo Foss DO [facility] f/u Pneumonia

## 2024-12-02 ENCOUNTER — LAB ENCOUNTER (OUTPATIENT)
Dept: LAB | Age: 64
End: 2024-12-02
Attending: FAMILY MEDICINE
Payer: COMMERCIAL

## 2024-12-02 ENCOUNTER — OFFICE VISIT (OUTPATIENT)
Dept: FAMILY MEDICINE CLINIC | Facility: CLINIC | Age: 64
End: 2024-12-02
Payer: COMMERCIAL

## 2024-12-02 VITALS
DIASTOLIC BLOOD PRESSURE: 62 MMHG | WEIGHT: 174 LBS | BODY MASS INDEX: 27.97 KG/M2 | HEART RATE: 79 BPM | SYSTOLIC BLOOD PRESSURE: 106 MMHG | HEIGHT: 66 IN | TEMPERATURE: 99 F

## 2024-12-02 DIAGNOSIS — J18.9 PNEUMONIA OF LEFT LOWER LOBE DUE TO INFECTIOUS ORGANISM: ICD-10-CM

## 2024-12-02 DIAGNOSIS — R05.1 ACUTE COUGH: ICD-10-CM

## 2024-12-02 DIAGNOSIS — R19.7 DIARRHEA, UNSPECIFIED TYPE: ICD-10-CM

## 2024-12-02 DIAGNOSIS — F17.200 TOBACCO USE DISORDER: ICD-10-CM

## 2024-12-02 DIAGNOSIS — F41.9 ANXIETY: ICD-10-CM

## 2024-12-02 DIAGNOSIS — J30.89 OTHER ALLERGIC RHINITIS: ICD-10-CM

## 2024-12-02 DIAGNOSIS — F51.04 PSYCHOPHYSIOLOGICAL INSOMNIA: ICD-10-CM

## 2024-12-02 DIAGNOSIS — R05.1 ACUTE COUGH: Primary | ICD-10-CM

## 2024-12-02 LAB
ALBUMIN SERPL-MCNC: 4.9 G/DL (ref 3.2–4.8)
ALBUMIN/GLOB SERPL: 2.1 {RATIO} (ref 1–2)
ALP LIVER SERPL-CCNC: 113 U/L
ALT SERPL-CCNC: 36 U/L
ANION GAP SERPL CALC-SCNC: 8 MMOL/L (ref 0–18)
AST SERPL-CCNC: 11 U/L (ref ?–34)
BASOPHILS # BLD AUTO: 0.11 X10(3) UL (ref 0–0.2)
BASOPHILS NFR BLD AUTO: 0.8 %
BILIRUB SERPL-MCNC: 0.3 MG/DL (ref 0.2–1.1)
BUN BLD-MCNC: 19 MG/DL (ref 9–23)
BUN/CREAT SERPL: 26.4 (ref 10–20)
CALCIUM BLD-MCNC: 10.8 MG/DL (ref 8.7–10.4)
CHLORIDE SERPL-SCNC: 104 MMOL/L (ref 98–112)
CO2 SERPL-SCNC: 30 MMOL/L (ref 21–32)
CREAT BLD-MCNC: 0.72 MG/DL
DEPRECATED RDW RBC AUTO: 39.2 FL (ref 35.1–46.3)
EGFRCR SERPLBLD CKD-EPI 2021: 93 ML/MIN/1.73M2 (ref 60–?)
EOSINOPHIL # BLD AUTO: 0.21 X10(3) UL (ref 0–0.7)
EOSINOPHIL NFR BLD AUTO: 1.5 %
ERYTHROCYTE [DISTWIDTH] IN BLOOD BY AUTOMATED COUNT: 12.4 % (ref 11–15)
FASTING STATUS PATIENT QL REPORTED: NO
GLOBULIN PLAS-MCNC: 2.3 G/DL (ref 2–3.5)
GLUCOSE BLD-MCNC: 119 MG/DL (ref 70–99)
HCT VFR BLD AUTO: 40.2 %
HGB BLD-MCNC: 13.3 G/DL
IMM GRANULOCYTES # BLD AUTO: 0.09 X10(3) UL (ref 0–1)
IMM GRANULOCYTES NFR BLD: 0.6 %
LYMPHOCYTES # BLD AUTO: 4.83 X10(3) UL (ref 1–4)
LYMPHOCYTES NFR BLD AUTO: 34.6 %
MCH RBC QN AUTO: 28.5 PG (ref 26–34)
MCHC RBC AUTO-ENTMCNC: 33.1 G/DL (ref 31–37)
MCV RBC AUTO: 86.1 FL
MONOCYTES # BLD AUTO: 0.82 X10(3) UL (ref 0.1–1)
MONOCYTES NFR BLD AUTO: 5.9 %
NEUTROPHILS # BLD AUTO: 7.89 X10 (3) UL (ref 1.5–7.7)
NEUTROPHILS # BLD AUTO: 7.89 X10(3) UL (ref 1.5–7.7)
NEUTROPHILS NFR BLD AUTO: 56.6 %
OSMOLALITY SERPL CALC.SUM OF ELEC: 297 MOSM/KG (ref 275–295)
PLATELET # BLD AUTO: 386 10(3)UL (ref 150–450)
POTASSIUM SERPL-SCNC: 4.8 MMOL/L (ref 3.5–5.1)
PROT SERPL-MCNC: 7.2 G/DL (ref 5.7–8.2)
RBC # BLD AUTO: 4.67 X10(6)UL
SODIUM SERPL-SCNC: 142 MMOL/L (ref 136–145)
WBC # BLD AUTO: 14 X10(3) UL (ref 4–11)

## 2024-12-02 PROCEDURE — 85025 COMPLETE CBC W/AUTO DIFF WBC: CPT

## 2024-12-02 PROCEDURE — 36415 COLL VENOUS BLD VENIPUNCTURE: CPT

## 2024-12-02 PROCEDURE — 80053 COMPREHEN METABOLIC PANEL: CPT

## 2024-12-02 PROCEDURE — 99214 OFFICE O/P EST MOD 30 MIN: CPT | Performed by: FAMILY MEDICINE

## 2024-12-02 RX ORDER — MONTELUKAST SODIUM 10 MG/1
10 TABLET ORAL NIGHTLY
Qty: 90 TABLET | Refills: 1 | Status: SHIPPED | OUTPATIENT
Start: 2024-12-02 | End: 2025-05-31

## 2024-12-02 RX ORDER — PREDNISONE 20 MG/1
TABLET ORAL
Qty: 10 TABLET | Refills: 0 | Status: SHIPPED | OUTPATIENT
Start: 2024-12-02

## 2024-12-02 RX ORDER — NORTRIPTYLINE HYDROCHLORIDE 10 MG/1
20 CAPSULE ORAL NIGHTLY
Qty: 180 CAPSULE | Refills: 1 | Status: SHIPPED | OUTPATIENT
Start: 2024-12-02

## 2024-12-02 NOTE — PROGRESS NOTES
Patient ID: Jocelyne Bernabe is a 64 year old female.    Pneumonia  She complains of cough. There is no shortness of breath. Pertinent negatives include no chest pain.     Chief Complaint   Patient presents with    Pneumonia     Follow up for pneumonia- continues with diarrhea, weakness and cough. Continues with abx     Last seen on 09/09/2024.      Pt works as a caregiver.     Pt was seen on 11/25/2024 at the immediate care for  fever, vomiting, diarrhea, and cough; symptoms began on 11/23/2024. She was given a chest XR which was normal. Her cough persisted and she went and saw a PA on 11/27/2024 who did another chest XR and was diagnosed with left lobe pneumonia. I reviewed both notes and XRs. She was given Zithromax, amoxicillin, tessalon, and albuterol. She states that the tessalon provides no relief for the cough. Pt is still experiencing a cough and diarrhea. She was not on antibiotics prior to the immediate care. Denies travel and is not experiencing SOB. I discussed with her and provided a note for work from 11/25/2024 to 12/8/2024.  She states she even had diarrhea prior to going to the immediate care so she does not think the diarrhea was due to the antibiotics that she was placed on.  The diarrhea is getting better and she is not dehydrated.    Pt stopped smoking when she started experiencing pneumonia symptoms. She currently has no desire to smoke but wanted to consult if there was any medications to help her quit once she feels better. Nicotine patches where ineffective in the past. She had been smoking six cigarettes a day but not a work. I discussed with her.  She decided she will try cold turkey.    She also would like a refill on montelukast for seasonal allergies and nortriptyline for sleep. She usually takes two nortriptyline at nighttime to have help her sleep.  I refilled both.      Wt Readings from Last 6 Encounters:   12/02/24 174 lb   11/27/24 174 lb   09/09/24 175 lb   06/28/24 182 lb    24 181 lb 6.4 oz   24 178 lb       BMI Readings from Last 6 Encounters:   24 28.08 kg/m²   24 28.08 kg/m²   24 28.25 kg/m²   24 29.38 kg/m²   24 29.28 kg/m²   24 28.73 kg/m²       BP Readings from Last 6 Encounters:   24 106/62   24 130/80   24 123/60   24 105/73   24 128/88   24 127/81         Review of Systems   Respiratory:  Positive for cough. Negative for shortness of breath.    Cardiovascular:  Negative for chest pain.   Gastrointestinal:  Positive for diarrhea.           Medical History:      Past Medical History:    Diabetes (HCC)    Hyperlipidemia     (normal spontaneous vaginal delivery) ([facility])    2       Past Surgical History:   Procedure Laterality Date    Appendectomy      Back surgery      low back surgery for herniated disc.     Laparoscopic cholecystectomy      Sinus surgery        nasal sinus surgery    Tonsillectomy      and adenoidectomy          Current Outpatient Medications   Medication Sig Dispense Refill    amoxicillin 500 MG Oral Cap Take 2 capsules (1,000 mg total) by mouth 3 (three) times daily for 7 days. 42 capsule 0    ondansetron (ZOFRAN) 4 mg tablet Take 1 tablet (4 mg total) by mouth every 8 (eight) hours as needed. 20 tablet 0    benzonatate 200 MG Oral Cap Take 1 capsule (200 mg total) by mouth 3 (three) times daily as needed. 30 capsule 0    mupirocin 2 % External Ointment Apply to affect lesions tid 30 g 0    azithromycin 250 MG Oral Tab Take 2 tablets (500 mg total) by mouth daily for 1 day, THEN 1 tablet (250 mg total) daily for 4 days. 6 tablet 0    albuterol 108 (90 Base) MCG/ACT Inhalation Aero Soln Inhale 2 puffs into the lungs every 4 (four) hours as needed for Wheezing or Shortness of Breath. 18 g 0    montelukast (SINGULAIR) 10 MG Oral Tab Take 1 tablet (10 mg total) by mouth nightly. 90 tablet 1    levocetirizine 5 MG Oral Tab Take 1 tablet (5 mg total) by mouth  every morning. For allergies. 90 tablet 1    azelastine 0.1 % Nasal Solution 2 sprays by Nasal route 2 (two) times daily. Squeeze nose after sprays and do not snort it back into throat. 3 each 1    lisinopril 5 MG Oral Tab Take 1 tablet (5 mg total) by mouth daily. For high blood pressure or kidney protection 90 tablet 1    nortriptyline (PAMELOR) 10 MG Oral Cap Take 2 capsules at nighttime to help you sleep.  If after 2 to 3 weeks you are still having trouble sleeping, start taking 3 capsules at nighttime. 90 capsule 1    rosuvastatin (CRESTOR) 20 MG Oral Tab Take 1 tablet (20 mg total) by mouth nightly. For cholesterol. 90 tablet 2    metFORMIN  MG Oral Tablet 24 Hr Take 1 tablet (500 mg total) by mouth daily. For elevated sugar 90 tablet 3    Benzocaine-Menthol (CEPACOL) 15-2.3 MG Mouth/Throat Lozenge Use as directed 1 lozenge in the mouth or throat as needed (sore throat). 16 lozenge 0    ibuprofen 600 MG Oral Tab Take 1 tablet (600 mg total) by mouth every 8 (eight) hours as needed for Pain or Fever. 30 tablet 0    Omeprazole 40 MG Oral Capsule Delayed Release Take 1 capsule (40 mg total) by mouth daily. 90 capsule 1    valACYclovir 500 MG Oral Tab Take 1 tablet (500 mg total) by mouth daily. 90 tablet 1    acyclovir 5 % External Ointment 1 application 3-4 times daily on the area of outbreak for 1 week. 30 g 1    clobetasol 0.05 % External Cream Apply 1 Application  topically 2 (two) times daily. 45 g 0     Allergies:Allergies[1]     Physical Exam:       Physical Exam  Blood pressure 106/62, pulse 79, temperature 98.5 °F (36.9 °C), temperature source Oral, height 5' 6\" (1.676 m), weight 174 lb, not currently breastfeeding.      Physical Exam   Constitutional: Patient is oriented to person, place, and time. Patient appears well-developed and well-nourished. No distress.   Head: Normocephalic.   Right Ear: Tympanic membrane, external ear and ear canal normal.   Left Ear: Tympanic membrane, external ear and  ear canal normal.   Nose: No mucosal edema or rhinorrhea.   Throat: Oropharynx is clear without exudate..  Moist mucous membranes  Eyes: Conjunctivae and EOM are normal.   Neck: Normal range of motion. No thyromegaly present.   Cardiovascular: Normal rate, regular rhythm and normal heart sounds.    Pulmonary/Chest: Effort normal and breath sounds normal. No respiratory distress.   Lymphadenopathy: Patient has no cervical adenopathy.  Neurological: Patient is alert and oriented to person, place, and time.   Skin: Skin is warm.  No pallor or diaphoresis  Psychiatry: Normal mood and affect.    Vitals reviewed.           Assessment/Plan:      Diagnoses and all orders for this visit:    Acute cough  -     CBC With Differential With Platelet; Future  -     Comp Metabolic Panel (14); Future  -     predniSONE 20 MG Oral Tab; Take 2 by mouth at same time daily for 5 days. (Best taken at BREAKFAST or LUNCH)  She is coughing through the visit and is already on antibiotics.  I am going to place her on prednisone which I think should help.  Continue the rest of the medications except the Tessalon which is not helping.  She Is Already Been Giving a Chest X-Ray Ordered to Do at the End of December to Follow-Up on the Pneumonia.  She Knows If She Gets Worse or Short of Breath As She Needs to Go to the Emergency Room.  Pneumonia of left lower lobe due to infectious organism  -     CBC With Differential With Platelet; Future  -     Comp Metabolic Panel (14); Future  -     predniSONE 20 MG Oral Tab; Take 2 by mouth at same time daily for 5 days. (Best taken at BREAKFAST or LUNCH)  I would like to do some basic labs on her.  Diarrhea, unspecified type  Not dehydrated.  Slowly getting better.  I do not think this is C. difficile.  Anxiety  -     nortriptyline (PAMELOR) 10 MG Oral Cap; Take 2 capsules (20 mg total) by mouth nightly. To help sleep.  Nortriptyline definitely helps her sleep and helps decrease anxiety  Psychophysiological  insomnia  -     nortriptyline (PAMELOR) 10 MG Oral Cap; Take 2 capsules (20 mg total) by mouth nightly. To help sleep.    Tobacco use disorder  She would try cold turkey and willpower.  Other allergic rhinitis  -     montelukast (SINGULAIR) 10 MG Oral Tab; Take 1 tablet (10 mg total) by mouth nightly.        Referrals (if applicable)  No orders of the defined types were placed in this encounter.      Follow up if symptoms persist.  Take medicine (if given) as prescribed.  Approach to treatment discussed and patient/family member understands and agrees to plan.     No follow-ups on file.    There are no Patient Instructions on file for this visit.    Pratibha Tamez    12/2/2024    By signing my name below, IPratibha,  attest that this documentation has been prepared under the direction and in the presence of Ronaldo Foss DO.   Electronically Signed: Pratibha Tamez, 12/2/2024, 2:29 PM.    I, Ronaldo Foss DO,  personally performed the services described in this documentation. All medical record entries made by the scribe were at my direction and in my presence.  I have reviewed the chart and discharge instructions (if applicable) and agree that the record reflects my personal performance and is accurate and complete.  Ronaldo Foss DO, 12/2/2024, 3:09 PM                  [1]   Allergies  Allergen Reactions    Shellfish Allergy SWELLING

## 2024-12-16 ENCOUNTER — LAB ENCOUNTER (OUTPATIENT)
Dept: LAB | Age: 64
End: 2024-12-16
Attending: FAMILY MEDICINE
Payer: COMMERCIAL

## 2024-12-16 ENCOUNTER — OFFICE VISIT (OUTPATIENT)
Dept: FAMILY MEDICINE CLINIC | Facility: CLINIC | Age: 64
End: 2024-12-16

## 2024-12-16 VITALS
HEART RATE: 89 BPM | SYSTOLIC BLOOD PRESSURE: 96 MMHG | WEIGHT: 176.19 LBS | DIASTOLIC BLOOD PRESSURE: 64 MMHG | BODY MASS INDEX: 28.32 KG/M2 | TEMPERATURE: 98 F | HEIGHT: 66 IN

## 2024-12-16 DIAGNOSIS — Z12.31 VISIT FOR SCREENING MAMMOGRAM: ICD-10-CM

## 2024-12-16 DIAGNOSIS — E11.9 TYPE 2 DIABETES MELLITUS WITHOUT COMPLICATION, WITHOUT LONG-TERM CURRENT USE OF INSULIN (HCC): ICD-10-CM

## 2024-12-16 DIAGNOSIS — D72.829 LEUKOCYTOSIS, UNSPECIFIED TYPE: ICD-10-CM

## 2024-12-16 DIAGNOSIS — J18.9 PNEUMONIA OF LEFT LOWER LOBE DUE TO INFECTIOUS ORGANISM: Primary | ICD-10-CM

## 2024-12-16 DIAGNOSIS — E83.52 HYPERCALCEMIA: ICD-10-CM

## 2024-12-16 DIAGNOSIS — Z87.891 HISTORY OF TOBACCO USE: ICD-10-CM

## 2024-12-16 DIAGNOSIS — J18.9 PNEUMONIA OF LEFT LOWER LOBE DUE TO INFECTIOUS ORGANISM: ICD-10-CM

## 2024-12-16 LAB
ANION GAP SERPL CALC-SCNC: 9 MMOL/L (ref 0–18)
BASOPHILS # BLD AUTO: 0.09 X10(3) UL (ref 0–0.2)
BASOPHILS NFR BLD AUTO: 0.8 %
BUN BLD-MCNC: 16 MG/DL (ref 9–23)
BUN/CREAT SERPL: 19 (ref 10–20)
CALCIUM BLD-MCNC: 10.6 MG/DL (ref 8.7–10.4)
CHLORIDE SERPL-SCNC: 102 MMOL/L (ref 98–112)
CO2 SERPL-SCNC: 31 MMOL/L (ref 21–32)
CREAT BLD-MCNC: 0.84 MG/DL
DEPRECATED RDW RBC AUTO: 42.9 FL (ref 35.1–46.3)
EGFRCR SERPLBLD CKD-EPI 2021: 78 ML/MIN/1.73M2 (ref 60–?)
EOSINOPHIL # BLD AUTO: 0.17 X10(3) UL (ref 0–0.7)
EOSINOPHIL NFR BLD AUTO: 1.4 %
ERYTHROCYTE [DISTWIDTH] IN BLOOD BY AUTOMATED COUNT: 13 % (ref 11–15)
EST. AVERAGE GLUCOSE BLD GHB EST-MCNC: 148 MG/DL (ref 68–126)
FASTING STATUS PATIENT QL REPORTED: NO
GLUCOSE BLD-MCNC: 139 MG/DL (ref 70–99)
HBA1C MFR BLD: 6.8 % (ref ?–5.7)
HCT VFR BLD AUTO: 41 %
HGB BLD-MCNC: 13.2 G/DL
IMM GRANULOCYTES # BLD AUTO: 0.04 X10(3) UL (ref 0–1)
IMM GRANULOCYTES NFR BLD: 0.3 %
LYMPHOCYTES # BLD AUTO: 3.97 X10(3) UL (ref 1–4)
LYMPHOCYTES NFR BLD AUTO: 33.8 %
MCH RBC QN AUTO: 28.9 PG (ref 26–34)
MCHC RBC AUTO-ENTMCNC: 32.2 G/DL (ref 31–37)
MCV RBC AUTO: 89.7 FL
MONOCYTES # BLD AUTO: 0.76 X10(3) UL (ref 0.1–1)
MONOCYTES NFR BLD AUTO: 6.5 %
NEUTROPHILS # BLD AUTO: 6.72 X10 (3) UL (ref 1.5–7.7)
NEUTROPHILS # BLD AUTO: 6.72 X10(3) UL (ref 1.5–7.7)
NEUTROPHILS NFR BLD AUTO: 57.2 %
OSMOLALITY SERPL CALC.SUM OF ELEC: 297 MOSM/KG (ref 275–295)
PLATELET # BLD AUTO: 333 10(3)UL (ref 150–450)
POTASSIUM SERPL-SCNC: 4.9 MMOL/L (ref 3.5–5.1)
PTH-INTACT SERPL-MCNC: 66.3 PG/ML (ref 18.5–88)
RBC # BLD AUTO: 4.57 X10(6)UL
SODIUM SERPL-SCNC: 142 MMOL/L (ref 136–145)
WBC # BLD AUTO: 11.8 X10(3) UL (ref 4–11)

## 2024-12-16 PROCEDURE — 85025 COMPLETE CBC W/AUTO DIFF WBC: CPT

## 2024-12-16 PROCEDURE — 83970 ASSAY OF PARATHORMONE: CPT

## 2024-12-16 PROCEDURE — 80048 BASIC METABOLIC PNL TOTAL CA: CPT

## 2024-12-16 PROCEDURE — 99214 OFFICE O/P EST MOD 30 MIN: CPT | Performed by: FAMILY MEDICINE

## 2024-12-16 PROCEDURE — 36415 COLL VENOUS BLD VENIPUNCTURE: CPT

## 2024-12-16 PROCEDURE — 83036 HEMOGLOBIN GLYCOSYLATED A1C: CPT

## 2024-12-16 NOTE — PROGRESS NOTES
Patient ID: Jocelyne Bernabe is a 64 year old female.    HPI  Chief Complaint   Patient presents with    Follow - Up     Per patient F/u from previous visit.     Last seen on 2024.      Pt has quite smoking on 2024.     Pt had a chest XR on 2024 that showed pneumonia. She feeling much better from her last visit and she hasn't really been coughing, especially since she stopped smoking. I reviewed her labs and I discussed her hypercalcemia with her.  She will do a chest x-ray early January.    She has also been feeling bloated. She is unsure if it is due to her DM or eating late at night. Last A1c value was 6.9% done 2024. Patient denies any chest pain, shortness breath, diaphoresis, dizziness, hypoglycemia, numbness or tingling in the legs. I discussed with her.  She is not having any vomiting.  She wonders if it is due to no longer smoking and perhaps indulging a little more with food.    Pt is due for a mammogram; I gave the order.       Wt Readings from Last 6 Encounters:   24 176 lb 3.2 oz   24 174 lb   24 174 lb   24 175 lb   24 182 lb   24 181 lb 6.4 oz       BMI Readings from Last 6 Encounters:   24 28.44 kg/m²   24 28.08 kg/m²   24 28.08 kg/m²   24 28.25 kg/m²   24 29.38 kg/m²   24 29.28 kg/m²       BP Readings from Last 6 Encounters:   24 96/64   24 106/62   24 130/80   24 123/60   24 105/73   24 128/88         Review of Systems   Constitutional:  Negative for diaphoresis.   Respiratory:  Negative for shortness of breath.    Cardiovascular:  Negative for chest pain.   Neurological:  Negative for dizziness and numbness.           Medical History:      Past Medical History:    Diabetes (HCC)    Hyperlipidemia     (normal spontaneous vaginal delivery) (HCC)    2       Past Surgical History:   Procedure Laterality Date    Appendectomy  1970    Back surgery      low back surgery  for herniated disc.     Laparoscopic cholecystectomy  1997    Sinus surgery    1998    nasal sinus surgery    Tonsillectomy  1967    and adenoidectomy          Current Outpatient Medications   Medication Sig Dispense Refill    predniSONE 20 MG Oral Tab Take 2 by mouth at same time daily for 5 days. (Best taken at BREAKFAST or LUNCH) 10 tablet 0    nortriptyline (PAMELOR) 10 MG Oral Cap Take 2 capsules (20 mg total) by mouth nightly. To help sleep. 180 capsule 1    montelukast (SINGULAIR) 10 MG Oral Tab Take 1 tablet (10 mg total) by mouth nightly. 90 tablet 1    ondansetron (ZOFRAN) 4 mg tablet Take 1 tablet (4 mg total) by mouth every 8 (eight) hours as needed. 20 tablet 0    benzonatate 200 MG Oral Cap Take 1 capsule (200 mg total) by mouth 3 (three) times daily as needed. 30 capsule 0    albuterol 108 (90 Base) MCG/ACT Inhalation Aero Soln Inhale 2 puffs into the lungs every 4 (four) hours as needed for Wheezing or Shortness of Breath. 18 g 0    levocetirizine 5 MG Oral Tab Take 1 tablet (5 mg total) by mouth every morning. For allergies. 90 tablet 1    azelastine 0.1 % Nasal Solution 2 sprays by Nasal route 2 (two) times daily. Squeeze nose after sprays and do not snort it back into throat. 3 each 1    lisinopril 5 MG Oral Tab Take 1 tablet (5 mg total) by mouth daily. For high blood pressure or kidney protection 90 tablet 1    rosuvastatin (CRESTOR) 20 MG Oral Tab Take 1 tablet (20 mg total) by mouth nightly. For cholesterol. 90 tablet 2    metFORMIN  MG Oral Tablet 24 Hr Take 1 tablet (500 mg total) by mouth daily. For elevated sugar 90 tablet 3    Benzocaine-Menthol (CEPACOL) 15-2.3 MG Mouth/Throat Lozenge Use as directed 1 lozenge in the mouth or throat as needed (sore throat). 16 lozenge 0    ibuprofen 600 MG Oral Tab Take 1 tablet (600 mg total) by mouth every 8 (eight) hours as needed for Pain or Fever. 30 tablet 0    Omeprazole 40 MG Oral Capsule Delayed Release Take 1 capsule (40 mg total) by  mouth daily. 90 capsule 1    valACYclovir 500 MG Oral Tab Take 1 tablet (500 mg total) by mouth daily. 90 tablet 1    acyclovir 5 % External Ointment 1 application 3-4 times daily on the area of outbreak for 1 week. 30 g 1    clobetasol 0.05 % External Cream Apply 1 Application  topically 2 (two) times daily. 45 g 0     Allergies:Allergies[1]     Physical Exam:       Physical Exam  Blood pressure 96/64, pulse 89, temperature 97.6 °F (36.4 °C), temperature source Tympanic, height 5' 6\" (1.676 m), weight 176 lb 3.2 oz, not currently breastfeeding.      Physical Exam   Constitutional: Patient is oriented to person, place, and time. Patient appears well-developed and well-nourished. No distress.   Neck: Normal range of motion. No thyromegaly present.   Cardiovascular: Normal rate, regular rhythm and normal heart sounds.    Pulmonary/Chest: Effort normal and breath sounds normal. No respiratory distress.   Lymphadenopathy: Patient has no cervical adenopathy.  Neurological: Patient is alert and oriented to person, place, and time.   Skin: Skin is warm.   Psychiatry: Normal mood and affect.  Abdominal: Normal appearance and bowel sounds are normal. There is    no tenderness.  There is no rigidity, no rebound, no guarding.    Vitals reviewed.           Assessment/Plan:      Diagnoses and all orders for this visit:    Pneumonia of left lower lobe due to infectious organism  -     CBC With Differential With Platelet; Future  -     XR CHEST PA + LAT CHEST (CPT=71046); Future  Follow-up chest x-ray in January.  She is doing very well clinically.  No coughing or fevers.  She did have a leukocytosis due to the pneumonia and I want to recheck that along with the BMP and PTH for the hypercalcemia.  Hypercalcemia  -     Basic Metabolic Panel (8); Future  -     PTH, Intact [E]; Future    Leukocytosis, unspecified type  -     CBC With Differential With Platelet; Future    Type 2 diabetes mellitus without complication, without  long-term current use of insulin (HCC)  -     Hemoglobin A1C; Future    History of tobacco use  Congratulations on her quitting  Visit for screening mammogram  -     [facility] SEAN 2D+3D SCREENING BILAT (CPT=77067/99175); Future        Referrals (if applicable)  No orders of the defined types were placed in this encounter.      Follow up if symptoms persist.  Take medicine (if given) as prescribed.  Approach to treatment discussed and patient/family member understands and agrees to plan.     No follow-ups on file.    Patient Instructions   CXR around 1/6/25.     Pratibha Tamez    12/16/2024    By signing my name below, IPratibha,  attest that this documentation has been prepared under the direction and in the presence of Ronaldo Foss DO.   Electronically Signed: Pratibha Tamez, 12/16/2024, 11:20 AM.    I, Ronaldo Foss DO,  personally performed the services described in this documentation. All medical record entries made by the scribe were at my direction and in my presence.  I have reviewed the chart and discharge instructions (if applicable) and agree that the record reflects my personal performance and is accurate and complete.  Ronaldo Foss DO, 12/16/2024, 12:26 PM                  [1]   Allergies  Allergen Reactions    Shellfish Allergy SWELLING

## 2024-12-22 DIAGNOSIS — E83.52 HYPERCALCEMIA: Primary | ICD-10-CM

## 2025-01-06 ENCOUNTER — LAB ENCOUNTER (OUTPATIENT)
Dept: LAB | Age: 65
End: 2025-01-06
Attending: FAMILY MEDICINE
Payer: COMMERCIAL

## 2025-01-06 ENCOUNTER — HOSPITAL ENCOUNTER (OUTPATIENT)
Dept: GENERAL RADIOLOGY | Age: 65
Discharge: HOME | End: 2025-01-06
Attending: FAMILY MEDICINE
Payer: COMMERCIAL

## 2025-01-06 DIAGNOSIS — J18.9 PNEUMONIA OF LEFT LOWER LOBE DUE TO INFECTIOUS ORGANISM: ICD-10-CM

## 2025-01-06 DIAGNOSIS — R73.09 ELEVATED GLUCOSE: ICD-10-CM

## 2025-01-06 DIAGNOSIS — A60.00 GENITAL HERPES SIMPLEX, UNSPECIFIED SITE: ICD-10-CM

## 2025-01-06 DIAGNOSIS — E83.52 HYPERCALCEMIA: ICD-10-CM

## 2025-01-06 DIAGNOSIS — E78.2 MIXED HYPERLIPIDEMIA: ICD-10-CM

## 2025-01-06 LAB — CALCIUM BLD-MCNC: 10.9 MG/DL (ref 8.7–10.4)

## 2025-01-06 PROCEDURE — 71046 X-RAY EXAM CHEST 2 VIEWS: CPT | Performed by: FAMILY MEDICINE

## 2025-01-06 PROCEDURE — 36415 COLL VENOUS BLD VENIPUNCTURE: CPT

## 2025-01-06 PROCEDURE — 82310 ASSAY OF CALCIUM: CPT

## 2025-01-09 DIAGNOSIS — E83.52 HYPERCALCEMIA: Primary | ICD-10-CM

## 2025-01-10 RX ORDER — METFORMIN HYDROCHLORIDE 500 MG/1
500 TABLET, EXTENDED RELEASE ORAL DAILY
Qty: 90 TABLET | Refills: 3 | OUTPATIENT
Start: 2025-01-10

## 2025-01-10 RX ORDER — ROSUVASTATIN CALCIUM 20 MG/1
20 TABLET, COATED ORAL NIGHTLY
Qty: 90 TABLET | Refills: 2 | OUTPATIENT
Start: 2025-01-10 | End: 2025-10-07

## 2025-01-10 NOTE — TELEPHONE ENCOUNTER
Metformin ER 500m year supply sent to [facility] in [place] on 2024    Rosuvastatin 20m month supply sent to [facility] in [place] on 2024

## 2025-01-10 NOTE — TELEPHONE ENCOUNTER
Please review; protocol failed/No Protocol    Last Office Visit: 2024    Requested Prescriptions   Pending Prescriptions Disp Refills    acyclovir 5 % External Ointment 30 g 1     Si application 3-4 times daily on the area of outbreak for 1 week.       There is no refill protocol information for this order      Refused Prescriptions Disp Refills    rosuvastatin (CRESTOR) 20 MG Oral Tab 90 tablet 2     Sig: Take 1 tablet (20 mg total) by mouth nightly. For cholesterol.       Cholesterol Medication Protocol Passed - 1/10/2025 10:50 AM        Passed - ALT < 80     Lab Results   Component Value Date    ALT 36 2024             Passed - ALT resulted within past year        Passed - Lipid panel within past 12 months     Lab Results   Component Value Date    CHOLEST 200 (H) 2024    TRIG 309 (H) 2024    HDL 49 2024    LDL 99 2024    VLDL 52 (H) 2024    NONHDLC 151 (H) 2024             Passed - In person appointment or virtual visit in the past 12 mos or appointment in next 3 mos     Recent Outpatient Visits              3 weeks ago Pneumonia of left lower lobe due to infectious organism    [facility], Ronaldo Reyes,     Office Visit    1 month ago Acute cough    [facility], Ronaldo Reyes,     Office Visit    1 month ago Acute cough    [facility], [place], Minhxuyen, PA-C    Office Visit    4 months ago Other allergic rhinitis    [facility],     Office Visit    6 months ago Type 2 diabetes mellitus without complication, without long-term current use of insulin ([facility])    [facility],     Office Visit          Future Appointments         Provider Department Appt Notes    In 3 weeks ADO DEXA RM1; ADO DENIA RM1 [facility]  [place]                     Passed - Medication is active on med list          metFORMIN  MG Oral Tablet 24 Hr 90 tablet 3     Sig: Take 1 tablet (500 mg total) by mouth daily. For elevated sugar       Diabetes Medication Protocol Passed - 1/10/2025 10:50 AM        Passed - Last A1C < 7.5 and within past 6 months     Lab Results   Component Value Date    A1C 6.8 (H) 12/16/2024             Passed - In person appointment or virtual visit in the past 6 mos or appointment in next 3 mos     Recent Outpatient Visits              3 weeks ago Pneumonia of left lower lobe due to infectious organism    [facility], [address], [place],     Office Visit    1 month ago Acute cough    [facility], [place],     Office Visit    1 month ago Acute cough    [facility], [place], Minhxuyen, PA-C    Office Visit    4 months ago Other allergic rhinitis    [facility], [place],     Office Visit    6 months ago Type 2 diabetes mellitus without complication, without long-term current use of insulin ([facility])    [facility], [place], [place]    Office Visit          Future Appointments         Provider Department Appt Notes    In 3 weeks ADO DEXA RM1; O [facility]                     Passed - Microalbumin procedure in past 12 months or taking ACE/ARB        Passed - EGFRCR or GFRNAA > 50     GFR Evaluation  EGFRCR: 78 , resulted on 12/16/2024          Passed - GFR in the past 12 months        Passed - Medication is active on med list           Future Appointments         Provider Department Appt Notes    In 3 weeks ADO DEXA RM1; O [address]           Recent Outpatient Visits              3 weeks ago Pneumonia of left lower lobe due to infectious organism     [facility], [address], Ronaldo Reyes,     Office Visit    1 month ago Acute cough    [facility], [address], Ronaldo Reyes,     Office Visit    1 month ago Acute cough    [facility], [facility], [place], Minhxuyen, PA-C    Office Visit    4 months ago Other allergic rhinitis    [facility], Ronaldo Reyes,     Office Visit    6 months ago Type 2 diabetes mellitus without complication, without long-term current use of insulin ([facility])    [facility], [address], Ronaldo Reyes,     Office Visit

## 2025-01-11 RX ORDER — ACYCLOVIR 50 MG/G
OINTMENT TOPICAL
Qty: 30 G | Refills: 1 | Status: SHIPPED | OUTPATIENT
Start: 2025-01-11

## 2025-02-08 ENCOUNTER — LAB ENCOUNTER (OUTPATIENT)
Dept: LAB | Age: 65
End: 2025-02-08
Attending: FAMILY MEDICINE
Payer: COMMERCIAL

## 2025-02-08 DIAGNOSIS — E83.52 HYPERCALCEMIA: ICD-10-CM

## 2025-02-08 LAB — CALCIUM BLD-MCNC: 9.9 MG/DL (ref 8.7–10.4)

## 2025-02-08 PROCEDURE — 82310 ASSAY OF CALCIUM: CPT

## 2025-02-08 PROCEDURE — 36415 COLL VENOUS BLD VENIPUNCTURE: CPT

## 2025-02-08 NOTE — ED PROVIDER NOTES
Bed: 03  Expected date:   Expected time:   Means of arrival:   Comments:  closed   Patient Seen in: [facility] Emergency Department    History   Patient presents with:  Head Neck Injury (neurologic, musculoskeletal)    Stated Complaint: Head Trauma - Pt states she was hit in the head by a ball yesterday and today f*    HPI  Pt c HPI.  Constitutional and vital signs reviewed. All other systems reviewed and negative except as noted above. PSFH elements reviewed from today and agreed except as otherwise stated in HPI.     Physical Exam   ED Triage Vitals [03/06/18 1212]  BP: 1 initial encounter  (primary encounter diagnosis)    Disposition:  Discharge    Follow-up:  Sophia Sanz MD  [address] [phone]            Medications Prescribed:  Discharge Medication List as of 3/6/2018  3:05 PM

## 2025-02-18 ENCOUNTER — E-VISIT (OUTPATIENT)
Dept: TELEHEALTH | Age: 65
End: 2025-02-18
Payer: COMMERCIAL

## 2025-02-18 DIAGNOSIS — M54.32 BACK PAIN WITH LEFT-SIDED SCIATICA: Primary | ICD-10-CM

## 2025-02-18 PROCEDURE — 99422 OL DIG E/M SVC 11-20 MIN: CPT | Performed by: PHYSICIAN ASSISTANT

## 2025-02-18 RX ORDER — METHYLPREDNISOLONE 4 MG/1
TABLET ORAL
Qty: 1 EACH | Refills: 0 | Status: SHIPPED | OUTPATIENT
Start: 2025-02-18

## 2025-02-18 NOTE — PROGRESS NOTES
Jocelyne Bernabe is a 64 year old female who initiated e-visit care today.    HPI:   See answers to questionnaire submission     Current Outpatient Medications   Medication Sig Dispense Refill    acyclovir 5 % External Ointment 1 application 3-4 times daily on the area of outbreak for 1 week. 30 g 1    nortriptyline (PAMELOR) 10 MG Oral Cap Take 2 capsules (20 mg total) by mouth nightly. To help sleep. 180 capsule 1    montelukast (SINGULAIR) 10 MG Oral Tab Take 1 tablet (10 mg total) by mouth nightly. 90 tablet 1    ondansetron (ZOFRAN) 4 mg tablet Take 1 tablet (4 mg total) by mouth every 8 (eight) hours as needed. 20 tablet 0    benzonatate 200 MG Oral Cap Take 1 capsule (200 mg total) by mouth 3 (three) times daily as needed. 30 capsule 0    albuterol 108 (90 Base) MCG/ACT Inhalation Aero Soln Inhale 2 puffs into the lungs every 4 (four) hours as needed for Wheezing or Shortness of Breath. 18 g 0    levocetirizine 5 MG Oral Tab Take 1 tablet (5 mg total) by mouth every morning. For allergies. 90 tablet 1    azelastine 0.1 % Nasal Solution 2 sprays by Nasal route 2 (two) times daily. Squeeze nose after sprays and do not snort it back into throat. 3 each 1    lisinopril 5 MG Oral Tab Take 1 tablet (5 mg total) by mouth daily. For high blood pressure or kidney protection 90 tablet 1    rosuvastatin (CRESTOR) 20 MG Oral Tab Take 1 tablet (20 mg total) by mouth nightly. For cholesterol. 90 tablet 2    metFORMIN  MG Oral Tablet 24 Hr Take 1 tablet (500 mg total) by mouth daily. For elevated sugar 90 tablet 3    Benzocaine-Menthol (CEPACOL) 15-2.3 MG Mouth/Throat Lozenge Use as directed 1 lozenge in the mouth or throat as needed (sore throat). 16 lozenge 0    ibuprofen 600 MG Oral Tab Take 1 tablet (600 mg total) by mouth every 8 (eight) hours as needed for Pain or Fever. 30 tablet 0    Omeprazole 40 MG Oral Capsule Delayed Release Take 1 capsule (40 mg total) by mouth daily. 90 capsule 1    valACYclovir 500 MG  Oral Tab Take 1 tablet (500 mg total) by mouth daily. 90 tablet 1    clobetasol 0.05 % External Cream Apply 1 Application  topically 2 (two) times daily. 45 g 0      Past Medical History:    Diabetes (HCC)    Hyperlipidemia     (normal spontaneous vaginal delivery) (HCC)    2      Past Surgical History:   Procedure Laterality Date    Appendectomy      Back surgery      low back surgery for herniated disc.     Laparoscopic cholecystectomy      Sinus surgery        nasal sinus surgery    Tonsillectomy  1967    and adenoidectomy      Family History   Problem Relation Age of Onset    Heart Disorder Father     Schizophrenia Mother     Cancer Daughter         leukemia    Other (lymphoma) Brother     Breast Cancer Neg     Ovarian Cancer Neg       Social History:  Social History     Socioeconomic History    Marital status:    Tobacco Use    Smoking status: Former     Current packs/day: 0.25     Average packs/day: 0.3 packs/day for 32.0 years (8.0 ttl pk-yrs)     Types: Cigarettes     Passive exposure: Current    Smokeless tobacco: Never    Tobacco comments:     5 cigarettes/day   Vaping Use    Vaping status: Never Used   Substance and Sexual Activity    Alcohol use: No     Alcohol/week: 0.0 standard drinks of alcohol    Drug use: No    Sexual activity: Never   Other Topics Concern    Caffeine Concern Yes     Comment: Coffee: 2-3 daily    Exercise Yes     Social Drivers of Health     Food Insecurity: Low Risk  (10/28/2022)    Received from [facility]    Food Insecurity     Have there been times that your food ran out, and you didn't have money to get more?: No     Are there times that you worry that this might happen?: No   Transportation Needs: Low Risk  (10/28/2022)    Received from [facility]    Transportation Needs     Do you have trouble getting transportation to medical  appointments?: No   Housing Stability: Low Risk  (10/28/2022)    [facility]    Housing Stability     Are you concerned about having a safe and reliable place to live?: No         ASSESSMENT AND PLAN:       Diagnoses and all orders for this visit:    Back pain with left-sided sciatica  -     methylPREDNISolone (MEDROL) 4 MG Oral Tablet Therapy Pack; As directed.     Highly encouraged follow up with orthospine due to repeat lumbar radiculopathy.       Duration of  the service:  11 minutes      See [employer] message exchange and Patient Instructions for Comfort Care and patient education.

## 2025-03-04 ENCOUNTER — E-VISIT (OUTPATIENT)
Dept: TELEHEALTH | Age: 65
End: 2025-03-04
Payer: COMMERCIAL

## 2025-03-04 DIAGNOSIS — R11.10 VOMITING IN ADULT: ICD-10-CM

## 2025-03-04 DIAGNOSIS — J11.1 INFLUENZA-LIKE ILLNESS: Primary | ICD-10-CM

## 2025-03-04 PROCEDURE — 99421 OL DIG E/M SVC 5-10 MIN: CPT | Performed by: PHYSICIAN ASSISTANT

## 2025-03-04 NOTE — PROGRESS NOTES
Jocelyne Bernabe is a 64 year old female who initiated e-visit care today.    HPI:   See answers to questionnaire submission     Current Outpatient Medications   Medication Sig Dispense Refill    methylPREDNISolone (MEDROL) 4 MG Oral Tablet Therapy Pack As directed. 1 each 0    acyclovir 5 % External Ointment 1 application 3-4 times daily on the area of outbreak for 1 week. 30 g 1    nortriptyline (PAMELOR) 10 MG Oral Cap Take 2 capsules (20 mg total) by mouth nightly. To help sleep. 180 capsule 1    montelukast (SINGULAIR) 10 MG Oral Tab Take 1 tablet (10 mg total) by mouth nightly. 90 tablet 1    ondansetron (ZOFRAN) 4 mg tablet Take 1 tablet (4 mg total) by mouth every 8 (eight) hours as needed. 20 tablet 0    benzonatate 200 MG Oral Cap Take 1 capsule (200 mg total) by mouth 3 (three) times daily as needed. 30 capsule 0    albuterol 108 (90 Base) MCG/ACT Inhalation Aero Soln Inhale 2 puffs into the lungs every 4 (four) hours as needed for Wheezing or Shortness of Breath. 18 g 0    levocetirizine 5 MG Oral Tab Take 1 tablet (5 mg total) by mouth every morning. For allergies. 90 tablet 1    azelastine 0.1 % Nasal Solution 2 sprays by Nasal route 2 (two) times daily. Squeeze nose after sprays and do not snort it back into throat. 3 each 1    lisinopril 5 MG Oral Tab Take 1 tablet (5 mg total) by mouth daily. For high blood pressure or kidney protection 90 tablet 1    rosuvastatin (CRESTOR) 20 MG Oral Tab Take 1 tablet (20 mg total) by mouth nightly. For cholesterol. 90 tablet 2    metFORMIN  MG Oral Tablet 24 Hr Take 1 tablet (500 mg total) by mouth daily. For elevated sugar 90 tablet 3    Benzocaine-Menthol (CEPACOL) 15-2.3 MG Mouth/Throat Lozenge Use as directed 1 lozenge in the mouth or throat as needed (sore throat). 16 lozenge 0    ibuprofen 600 MG Oral Tab Take 1 tablet (600 mg total) by mouth every 8 (eight) hours as needed for Pain or Fever. 30 tablet 0    Omeprazole 40 MG Oral Capsule Delayed Release  Take 1 capsule (40 mg total) by mouth daily. 90 capsule 1    valACYclovir 500 MG Oral Tab Take 1 tablet (500 mg total) by mouth daily. 90 tablet 1    clobetasol 0.05 % External Cream Apply 1 Application  topically 2 (two) times daily. 45 g 0      Past Medical History:    Diabetes (HCC)    Hyperlipidemia     (normal spontaneous vaginal delivery) (HCC)    2      Past Surgical History:   Procedure Laterality Date    Appendectomy      Back surgery      low back surgery for herniated disc.     Laparoscopic cholecystectomy      Sinus surgery        nasal sinus surgery    Tonsillectomy  1967    and adenoidectomy      Family History   Problem Relation Age of Onset    Heart Disorder Father     Schizophrenia Mother     Cancer Daughter         leukemia    Other (lymphoma) Brother     Breast Cancer Neg     Ovarian Cancer Neg       Social History:  Social History     Socioeconomic History    Marital status:    Tobacco Use    Smoking status: Former     Current packs/day: 0.25     Average packs/day: 0.3 packs/day for 32.0 years (8.0 ttl pk-yrs)     Types: Cigarettes     Passive exposure: Current    Smokeless tobacco: Never    Tobacco comments:     5 cigarettes/day   Vaping Use    Vaping status: Never Used   Substance and Sexual Activity    Alcohol use: No     Alcohol/week: 0.0 standard drinks of alcohol    Drug use: No    Sexual activity: Never   Other Topics Concern    Caffeine Concern Yes     Comment: Coffee: 2-3 daily    Exercise Yes     Social Drivers of Health     Food Insecurity: Low Risk  (10/28/2022)    Received from [facility]    Food Insecurity     Have there been times that your food ran out, and you didn't have money to get more?: No     Are there times that you worry that this might happen?: No   Transportation Needs: Low Risk  (10/28/2022)    Received from [facility]     Transportation Needs     Do you have trouble getting transportation to medical appointments?: No   Housing Stability: Low Risk  (10/28/2022)    [facility]    Housing Stability     Are you concerned about having a safe and reliable place to live?: No         ASSESSMENT AND PLAN:       Diagnoses and all orders for this visit:    Influenza-like illness    Vomiting in adult       Referred to IC for examination +/- viral testing    Duration of  the service:  2 minutes      See [employer] message exchange and Patient Instructions for Comfort Care and patient education.

## 2025-03-06 ENCOUNTER — HOSPITAL ENCOUNTER (OUTPATIENT)
Dept: GENERAL RADIOLOGY | Age: 65
Discharge: HOME | End: 2025-03-06
Payer: COMMERCIAL

## 2025-03-06 ENCOUNTER — OFFICE VISIT (OUTPATIENT)
Dept: FAMILY MEDICINE CLINIC | Facility: CLINIC | Age: 65
End: 2025-03-06
Payer: COMMERCIAL

## 2025-03-06 VITALS
TEMPERATURE: 97 F | OXYGEN SATURATION: 97 % | DIASTOLIC BLOOD PRESSURE: 58 MMHG | WEIGHT: 173.19 LBS | HEIGHT: 66 IN | HEART RATE: 82 BPM | BODY MASS INDEX: 27.83 KG/M2 | SYSTOLIC BLOOD PRESSURE: 108 MMHG

## 2025-03-06 DIAGNOSIS — T14.8XXA SKIN EXCORIATION: ICD-10-CM

## 2025-03-06 DIAGNOSIS — J06.9 VIRAL URI WITH COUGH: ICD-10-CM

## 2025-03-06 DIAGNOSIS — B00.1 HERPES LABIALIS: Primary | ICD-10-CM

## 2025-03-06 DIAGNOSIS — R50.9 FEVER, UNSPECIFIED FEVER CAUSE: ICD-10-CM

## 2025-03-06 PROCEDURE — 99214 OFFICE O/P EST MOD 30 MIN: CPT

## 2025-03-06 PROCEDURE — 71046 X-RAY EXAM CHEST 2 VIEWS: CPT

## 2025-03-06 RX ORDER — VALACYCLOVIR HYDROCHLORIDE 1 G/1
2000 TABLET, FILM COATED ORAL EVERY 12 HOURS SCHEDULED
Qty: 4 TABLET | Refills: 0 | Status: SHIPPED | OUTPATIENT
Start: 2025-03-06 | End: 2025-03-07

## 2025-03-06 RX ORDER — BENZONATATE 100 MG/1
100 CAPSULE ORAL 3 TIMES DAILY PRN
Qty: 30 CAPSULE | Refills: 0 | Status: SHIPPED | OUTPATIENT
Start: 2025-03-06

## 2025-03-06 RX ORDER — DEXTROMETHORPHAN HYDROBROMIDE AND PROMETHAZINE HYDROCHLORIDE 15; 6.25 MG/5ML; MG/5ML
5 SYRUP ORAL 4 TIMES DAILY PRN
Qty: 120 ML | Refills: 0 | Status: SHIPPED | OUTPATIENT
Start: 2025-03-06

## 2025-03-06 RX ORDER — MUPIROCIN 20 MG/G
OINTMENT TOPICAL
Qty: 30 G | Refills: 0 | Status: SHIPPED | OUTPATIENT
Start: 2025-03-06

## 2025-03-06 NOTE — PROGRESS NOTES
Subjective:   Jocelyne Bernabe is a 64 year old female who presents for Flu (Past 5 days, fever, nausea, mucus: yellowish-white, ear pain, took tylenol & Motrin, headache,)   Patient is a pleasant 64-year-old female past medical history significant for hypertension, hyperlipidemia, diabetes, anxiety/depression, and allergic rhinitis.  Patient presents to office today new to this provider for evaluation of sick since  with fever.  Of note patient presented via telehealth and saw physicians assistant on 3/4/2025 was diagnosed with flulike illness and supportive measures reviewed.  Patient follows up in office today and states on Saturday night she started to have fever and chills. It was 103 reported. She had some rigors. She has been drinking water and resting. She had lots of pressure in head and sinus pressure. She had headaches as well. She was using a netipot. She does have a cough. Does have a sore throat. She had diarrhea x3. She repots some pghelm and congestion. No sick contacts. She is a caregiver. She has taken tylneol and motrin. She has oral herpes labialis on right lower lip. She has large excoriaiton on left nares.         Past Medical History:    Diabetes (HCC)    Hyperlipidemia     (normal spontaneous vaginal delivery) (HCC)    2      Past Surgical History:   Procedure Laterality Date    Appendectomy      Back surgery      low back surgery for herniated disc.     Laparoscopic cholecystectomy      Sinus surgery        nasal sinus surgery    Tonsillectomy  1967    and adenoidectomy        History/Other:    Chief Complaint Reviewed and Verified  Nursing Notes Reviewed and   Verified  Tobacco Reviewed  Allergies Reviewed  Medications Reviewed    Medical History Reviewed  Surgical History Reviewed  OB Status Reviewed    Family History Reviewed  Social History Reviewed         Tobacco:  She smoked tobacco in the past but quit greater than 12 months ago.  Social History      Tobacco Use   Smoking Status Former    Current packs/day: 0.25    Average packs/day: 0.3 packs/day for 32.0 years (8.0 ttl pk-yrs)    Types: Cigarettes    Passive exposure: Current   Smokeless Tobacco Never   Tobacco Comments    5 cigarettes/day        Current Outpatient Medications   Medication Sig Dispense Refill    mupirocin 2 % External Ointment Apply to affect lesions twice daily for 5 days 30 g 0    benzonatate (TESSALON PERLES) 100 MG Oral Cap Take 1 capsule (100 mg total) by mouth 3 (three) times daily as needed for cough. 30 capsule 0    promethazine-dextromethorphan 6.25-15 MG/5ML Oral Syrup Take 5 mL by mouth 4 (four) times daily as needed for cough. 120 mL 0    valACYclovir 1 G Oral Tab Take 2 tablets (2,000 mg total) by mouth every 12 (twelve) hours for 1 day. 4 tablet 0    nortriptyline (PAMELOR) 10 MG Oral Cap Take 2 capsules (20 mg total) by mouth nightly. To help sleep. 180 capsule 1    ondansetron (ZOFRAN) 4 mg tablet Take 1 tablet (4 mg total) by mouth every 8 (eight) hours as needed. 20 tablet 0    levocetirizine 5 MG Oral Tab Take 1 tablet (5 mg total) by mouth every morning. For allergies. 90 tablet 1    lisinopril 5 MG Oral Tab Take 1 tablet (5 mg total) by mouth daily. For high blood pressure or kidney protection 90 tablet 1    rosuvastatin (CRESTOR) 20 MG Oral Tab Take 1 tablet (20 mg total) by mouth nightly. For cholesterol. 90 tablet 2    metFORMIN  MG Oral Tablet 24 Hr Take 1 tablet (500 mg total) by mouth daily. For elevated sugar 90 tablet 3    ibuprofen 600 MG Oral Tab Take 1 tablet (600 mg total) by mouth every 8 (eight) hours as needed for Pain or Fever. 30 tablet 0    Omeprazole 40 MG Oral Capsule Delayed Release Take 1 capsule (40 mg total) by mouth daily. 90 capsule 1    methylPREDNISolone (MEDROL) 4 MG Oral Tablet Therapy Pack As directed. (Patient not taking: Reported on 3/6/2025) 1 each 0    acyclovir 5 % External Ointment 1 application 3-4 times daily on the area of  outbreak for 1 week. (Patient not taking: Reported on 3/6/2025) 30 g 1    montelukast (SINGULAIR) 10 MG Oral Tab Take 1 tablet (10 mg total) by mouth nightly. (Patient not taking: Reported on 3/6/2025) 90 tablet 1    albuterol 108 (90 Base) MCG/ACT Inhalation Aero Soln Inhale 2 puffs into the lungs every 4 (four) hours as needed for Wheezing or Shortness of Breath. (Patient not taking: Reported on 3/6/2025) 18 g 0    azelastine 0.1 % Nasal Solution 2 sprays by Nasal route 2 (two) times daily. Squeeze nose after sprays and do not snort it back into throat. (Patient not taking: Reported on 3/6/2025) 3 each 1    Benzocaine-Menthol (CEPACOL) 15-2.3 MG Mouth/Throat Lozenge Use as directed 1 lozenge in the mouth or throat as needed (sore throat). (Patient not taking: Reported on 3/6/2025) 16 lozenge 0    clobetasol 0.05 % External Cream Apply 1 Application  topically 2 (two) times daily. (Patient not taking: Reported on 3/6/2025) 45 g 0         Review of Systems:  Review of Systems   Constitutional:  Positive for chills and fever.   HENT:  Positive for congestion, postnasal drip, rhinorrhea and sore throat. Negative for ear pain.    Respiratory:  Positive for cough.    Cardiovascular:  Negative for chest pain.   Gastrointestinal:  Positive for diarrhea. Negative for constipation, nausea and vomiting.         Objective:   /58 (BP Location: Right arm, Patient Position: Sitting, Cuff Size: large)   Pulse 82   Temp 97.4 °F (36.3 °C) (Oral)   Ht 5' 6\" (1.676 m)   Wt 173 lb 3.2 oz (78.6 kg)   SpO2 97%   BMI 27.96 kg/m²  Estimated body mass index is 27.96 kg/m² as calculated from the following:    Height as of this encounter: 5' 6\" (1.676 m).    Weight as of this encounter: 173 lb 3.2 oz (78.6 kg).  Physical Exam  Vitals and nursing note reviewed.   Constitutional:       Appearance: Normal appearance. She is normal weight.   HENT:      Head: Normocephalic and atraumatic.        Comments: Excoriation left outer  nares    Multiple red based clear vesiculae lesions in linear fashion on lower lip, right lower.      Right Ear: Tympanic membrane, ear canal and external ear normal.      Left Ear: Tympanic membrane, ear canal and external ear normal.      Nose: Congestion and rhinorrhea present.      Mouth/Throat:      Mouth: Mucous membranes are moist.      Pharynx: Oropharynx is clear. Posterior oropharyngeal erythema present.      Comments: +pnd  Cardiovascular:      Rate and Rhythm: Normal rate and regular rhythm.      Pulses: Normal pulses.      Heart sounds: Normal heart sounds. No murmur heard.  Pulmonary:      Effort: Pulmonary effort is normal. No respiratory distress.      Breath sounds: Normal breath sounds. No wheezing.   Musculoskeletal:      Cervical back: No tenderness.   Lymphadenopathy:      Cervical: No cervical adenopathy.   Skin:     Capillary Refill: Capillary refill takes less than 2 seconds.      Findings: Lesion present.   Neurological:      Mental Status: She is alert and oriented to person, place, and time.           Assessment & Plan:   1. Herpes labialis (Primary)  -     valACYclovir HCl; Take 2 tablets (2,000 mg total) by mouth every 12 (twelve) hours for 1 day.  Dispense: 4 tablet; Refill: 0  2. Viral URI with cough  -     SARS-COV-22-ALINITY; Future; Expected date: 03/06/2025  -     Benzonatate; Take 1 capsule (100 mg total) by mouth 3 (three) times daily as needed for cough.  Dispense: 30 capsule; Refill: 0  -     Promethazine-DM; Take 5 mL by mouth 4 (four) times daily as needed for cough.  Dispense: 120 mL; Refill: 0  -     XR CHEST PA + LAT CHEST (CPT=71046); Future; Expected date: 03/06/2025  3. Skin excoriation  -     Mupirocin; Apply to affect lesions twice daily for 5 days  Dispense: 30 g; Refill: 0  4. Fever, unspecified fever cause  -     XR CHEST PA + LAT CHEST (CPT=71046); Future; Expected date: 03/06/2025    1. Herpes labialis  Previously on daily suppressive therapy  Now has flares when  sick  Has placed cream with no luck  Start recurrent therapt  - valACYclovir 1 G Oral Tab; Take 2 tablets (2,000 mg total) by mouth every 12 (twelve) hours for 1 day.  Dispense: 4 tablet; Refill: 0    2. Viral URI with cough  Potential sick contacts  Viral panel in office sent   Supportive measures reviewed and include rotating antipyretics, using humidifier, hot showers, hot tea with honey.  Increase fluid intake and rest.  Cough suppressants as ordered  - SARS-CoV-2/Flu A and B/RSV by PCR ([facility]); Future  - benzonatate (TESSALON PERLES) 100 MG Oral Cap; Take 1 capsule (100 mg total) by mouth 3 (three) times daily as needed for cough.  Dispense: 30 capsule; Refill: 0  - promethazine-dextromethorphan 6.25-15 MG/5ML Oral Syrup; Take 5 mL by mouth 4 (four) times daily as needed for cough.  Dispense: 120 mL; Refill: 0  - XR CHEST PA + LAT CHEST (CPT=71046); Future    3. Skin excoriation  Likely from wiping nose and rhinorrhea  Start mupirocin BID x 5 days  Advised skin protectant in future  - mupirocin 2 % External Ointment; Apply to affect lesions twice daily for 5 days  Dispense: 30 g; Refill: 0    4. Fever, unspecified fever cause  Reported fever 103 with cough and congestion  Check CXR to r/o PNA  - XR CHEST PA + LAT CHEST (CPT=71046); Future    Patient aware of plan of care. All questions answered to satisfaction of the patient. Patient instructed to call office or reach out via [employer] if any issues arise. For urgent issues and/or reviewed red flags please proceed to the urgent care or ER.  Also, inform the nurse practitioner with any new symptoms or medication side effects.        Return for Annual physical.    CARLENE Montelongo, 3/6/2025, 7:49 AM

## 2025-03-07 LAB
FLUAV + FLUBV RNA SPEC NAA+PROBE: NOT DETECTED
FLUAV + FLUBV RNA SPEC NAA+PROBE: NOT DETECTED
RSV RNA SPEC NAA+PROBE: NOT DETECTED
SARS-COV-2 RNA RESP QL NAA+PROBE: DETECTED

## 2025-03-19 DIAGNOSIS — I10 ESSENTIAL HYPERTENSION: ICD-10-CM

## 2025-03-19 DIAGNOSIS — E11.9 TYPE 2 DIABETES MELLITUS WITHOUT COMPLICATION, WITHOUT LONG-TERM CURRENT USE OF INSULIN (HCC): ICD-10-CM

## 2025-03-24 RX ORDER — LISINOPRIL 5 MG/1
5 TABLET ORAL DAILY
Qty: 90 TABLET | Refills: 3 | Status: SHIPPED | OUTPATIENT
Start: 2025-03-24

## 2025-03-24 NOTE — TELEPHONE ENCOUNTER
Refill Per Protocol     Requested Prescriptions   Pending Prescriptions Disp Refills    LISINOPRIL 5 MG Oral Tab [Pharmacy Med Name: LISINOPRIL 5MG TABLETS] 90 tablet 1     Sig: TAKE 1 TABLET(5 MG) BY MOUTH DAILY FOR HIGH BLOOD PRESSURE OR KIDNEY PROTECTION       Hypertension Medications Protocol Passed - 3/24/2025  8:54 AM        Passed - CMP or BMP in past 12 months        Passed - Last BP reading less than 140/90     BP Readings from Last 1 Encounters:   03/06/25 108/58               Passed - In person appointment or virtual visit in the past 12 mos or appointment in next 3 mos     Recent Outpatient Visits              2 weeks ago Herpes labialis    [facility], [facility] NelsonRamez Ramirez APRN    Office Visit    2 weeks ago Influenza-like illness    [facility], Virtual Visit Heather Choi PA-C    E-Visit    1 month ago Back pain with left-sided sciatica    [facility], Virtual Visit Heather Choi PA-C    E-Visit    3 months ago Pneumonia of left lower lobe due to infectious organism    [facility] [address] [place]    Office Visit    3 months ago Acute cough    [facility], [address] NelsonRonaldo Reardon,     Office Visit          Future Appointments         Provider Department Appt Notes    In 2 weeks [facility] DEXA RM1; [facility] DENIA RM1 [facility] [place] PT TESTED COVID POSITIVE 3/6/25 TC                    Passed - EGFRCR or GFRNAA > 50     GFR Evaluation  EGFRCR: 78 , resulted on 12/16/2024          Passed - Medication is active on med list

## 2025-04-08 DIAGNOSIS — E78.2 MIXED HYPERLIPIDEMIA: ICD-10-CM

## 2025-04-10 RX ORDER — ROSUVASTATIN CALCIUM 20 MG/1
20 TABLET, COATED ORAL NIGHTLY
Qty: 90 TABLET | Refills: 3 | Status: SHIPPED | OUTPATIENT
Start: 2025-04-10

## 2025-04-10 NOTE — TELEPHONE ENCOUNTER
Refill Per Protocol     Requested Prescriptions   Pending Prescriptions Disp Refills    ROSUVASTATIN 20 MG Oral Tab [Pharmacy Med Name: ROSUVASTATIN 20MG TABLETS] 90 tablet 2     Sig: TAKE 1 TABLET(20 MG) BY MOUTH EVERY NIGHT FOR CHOLESTEROL       Cholesterol Medication Protocol Passed - 4/10/2025  2:45 PM        Passed - ALT < 80     Lab Results   Component Value Date    ALT 36 12/02/2024             Passed - ALT resulted within past year        Passed - Lipid panel within past 12 months     Lab Results   Component Value Date    CHOLEST 200 (H) 07/02/2024    TRIG 309 (H) 07/02/2024    HDL 49 07/02/2024    LDL 99 07/02/2024    VLDL 52 (H) 07/02/2024    NONHDLC 151 (H) 07/02/2024             Passed - In person appointment or virtual visit in the past 12 mos or appointment in next 3 mos     Recent Outpatient Visits              1 month ago Herpes labialis    [facility], [facility] [place] Ramez Eldridge APRN    Office Visit    1 month ago Influenza-like illness    [facility], Virtual Visit Heather Choi PA-C    E-Visit    1 month ago Back pain with left-sided sciatica    [facility], Virtual Visit Heather Choi PA-C    E-Visit    3 months ago Pneumonia of left lower lobe due to infectious organism    [facility]    Office Visit    4 months ago Acute cough    [facility]     [facility]          Future Appointments         Provider Department Appt Notes    In 2 days LMB DEXA RM1; [facility] DENIA RM1 [facility] [place] PT TESTED COVID POSITIVE 3/6/25 TC                    Passed - Medication is active on med list

## 2025-04-15 ENCOUNTER — PATIENT MESSAGE (OUTPATIENT)
Dept: FAMILY MEDICINE CLINIC | Facility: CLINIC | Age: 65
End: 2025-04-15

## 2025-04-15 DIAGNOSIS — A60.00 GENITAL HERPES SIMPLEX, UNSPECIFIED SITE: ICD-10-CM

## 2025-04-15 DIAGNOSIS — B00.1 HERPES LABIALIS: ICD-10-CM

## 2025-04-15 RX ORDER — VALACYCLOVIR HYDROCHLORIDE 1 G/1
2000 TABLET, FILM COATED ORAL EVERY 12 HOURS SCHEDULED
Qty: 4 TABLET | Refills: 0 | Status: CANCELLED
Start: 2025-04-15 | End: 2025-04-16

## 2025-04-17 RX ORDER — VALACYCLOVIR HYDROCHLORIDE 500 MG/1
500 TABLET, FILM COATED ORAL DAILY
Qty: 30 TABLET | Refills: 0 | Status: SHIPPED | OUTPATIENT
Start: 2025-04-17

## 2025-04-18 RX ORDER — ACYCLOVIR 50 MG/G
OINTMENT TOPICAL
Qty: 30 G | Refills: 1 | Status: SHIPPED | OUTPATIENT
Start: 2025-04-18

## 2025-04-18 NOTE — TELEPHONE ENCOUNTER
Please review.  Protocol failed/has no protocol    Last Office Visit: 2024  Requested Prescriptions     Pending Prescriptions Disp Refills    acyclovir 5 % External Ointment 30 g 1     Si application 3-4 times daily on the area of outbreak for 1 week.

## 2025-06-02 ENCOUNTER — OFFICE VISIT (OUTPATIENT)
Dept: FAMILY MEDICINE CLINIC | Facility: CLINIC | Age: 65
End: 2025-06-02
Payer: COMMERCIAL

## 2025-06-02 ENCOUNTER — LAB ENCOUNTER (OUTPATIENT)
Dept: LAB | Age: 65
End: 2025-06-02
Attending: FAMILY MEDICINE
Payer: COMMERCIAL

## 2025-06-02 VITALS
DIASTOLIC BLOOD PRESSURE: 76 MMHG | TEMPERATURE: 97 F | HEART RATE: 93 BPM | SYSTOLIC BLOOD PRESSURE: 112 MMHG | BODY MASS INDEX: 29.25 KG/M2 | WEIGHT: 182 LBS | HEIGHT: 66 IN

## 2025-06-02 DIAGNOSIS — M25.432 PAIN AND SWELLING OF LEFT WRIST: ICD-10-CM

## 2025-06-02 DIAGNOSIS — E11.9 TYPE 2 DIABETES MELLITUS WITHOUT COMPLICATION, WITHOUT LONG-TERM CURRENT USE OF INSULIN (HCC): ICD-10-CM

## 2025-06-02 DIAGNOSIS — M25.432 PAIN AND SWELLING OF LEFT WRIST: Primary | ICD-10-CM

## 2025-06-02 DIAGNOSIS — L74.9 SWEATING ABNORMALITY: ICD-10-CM

## 2025-06-02 DIAGNOSIS — M25.532 PAIN AND SWELLING OF LEFT WRIST: ICD-10-CM

## 2025-06-02 DIAGNOSIS — F41.9 ANXIETY: ICD-10-CM

## 2025-06-02 DIAGNOSIS — M25.532 PAIN AND SWELLING OF LEFT WRIST: Primary | ICD-10-CM

## 2025-06-02 DIAGNOSIS — F51.04 PSYCHOPHYSIOLOGICAL INSOMNIA: ICD-10-CM

## 2025-06-02 DIAGNOSIS — D72.829 LEUKOCYTOSIS, UNSPECIFIED TYPE: ICD-10-CM

## 2025-06-02 DIAGNOSIS — R39.11 URINARY HESITANCY: ICD-10-CM

## 2025-06-02 DIAGNOSIS — J30.89 OTHER ALLERGIC RHINITIS: ICD-10-CM

## 2025-06-02 LAB
ANION GAP SERPL CALC-SCNC: 9 MMOL/L (ref 0–18)
BASOPHILS # BLD AUTO: 0.08 X10(3) UL (ref 0–0.2)
BASOPHILS NFR BLD AUTO: 0.9 %
BUN BLD-MCNC: 19 MG/DL (ref 9–23)
BUN/CREAT SERPL: 20.7 (ref 10–20)
CALCIUM BLD-MCNC: 10.5 MG/DL (ref 8.7–10.4)
CHLORIDE SERPL-SCNC: 101 MMOL/L (ref 98–112)
CO2 SERPL-SCNC: 30 MMOL/L (ref 21–32)
CREAT BLD-MCNC: 0.92 MG/DL (ref 0.55–1.02)
DEPRECATED RDW RBC AUTO: 39.8 FL (ref 35.1–46.3)
EGFRCR SERPLBLD CKD-EPI 2021: 69 ML/MIN/1.73M2 (ref 60–?)
EOSINOPHIL # BLD AUTO: 0.14 X10(3) UL (ref 0–0.7)
EOSINOPHIL NFR BLD AUTO: 1.6 %
ERYTHROCYTE [DISTWIDTH] IN BLOOD BY AUTOMATED COUNT: 12.4 % (ref 11–15)
EST. AVERAGE GLUCOSE BLD GHB EST-MCNC: 148 MG/DL (ref 68–126)
FASTING STATUS PATIENT QL REPORTED: NO
GLUCOSE BLD-MCNC: 124 MG/DL (ref 70–99)
HBA1C MFR BLD: 6.8 % (ref ?–5.7)
HCT VFR BLD AUTO: 41.8 % (ref 35–48)
HGB BLD-MCNC: 13.6 G/DL (ref 12–16)
IMM GRANULOCYTES # BLD AUTO: 0.03 X10(3) UL (ref 0–1)
IMM GRANULOCYTES NFR BLD: 0.3 %
LYMPHOCYTES # BLD AUTO: 3.51 X10(3) UL (ref 1–4)
LYMPHOCYTES NFR BLD AUTO: 39.8 %
MCH RBC QN AUTO: 28.5 PG (ref 26–34)
MCHC RBC AUTO-ENTMCNC: 32.5 G/DL (ref 31–37)
MCV RBC AUTO: 87.4 FL (ref 80–100)
MONOCYTES # BLD AUTO: 0.64 X10(3) UL (ref 0.1–1)
MONOCYTES NFR BLD AUTO: 7.2 %
NEUTROPHILS # BLD AUTO: 4.43 X10 (3) UL (ref 1.5–7.7)
NEUTROPHILS # BLD AUTO: 4.43 X10(3) UL (ref 1.5–7.7)
NEUTROPHILS NFR BLD AUTO: 50.2 %
OSMOLALITY SERPL CALC.SUM OF ELEC: 294 MOSM/KG (ref 275–295)
PLATELET # BLD AUTO: 348 10(3)UL (ref 150–450)
POTASSIUM SERPL-SCNC: 4.8 MMOL/L (ref 3.5–5.1)
RBC # BLD AUTO: 4.78 X10(6)UL (ref 3.8–5.3)
SODIUM SERPL-SCNC: 140 MMOL/L (ref 136–145)
TSI SER-ACNC: 2.25 UIU/ML (ref 0.55–4.78)
URATE SERPL-MCNC: 6.4 MG/DL (ref 3.1–7.8)
WBC # BLD AUTO: 8.8 X10(3) UL (ref 4–11)

## 2025-06-02 PROCEDURE — 84550 ASSAY OF BLOOD/URIC ACID: CPT

## 2025-06-02 PROCEDURE — 85025 COMPLETE CBC W/AUTO DIFF WBC: CPT

## 2025-06-02 PROCEDURE — 84443 ASSAY THYROID STIM HORMONE: CPT

## 2025-06-02 PROCEDURE — 80048 BASIC METABOLIC PNL TOTAL CA: CPT

## 2025-06-02 PROCEDURE — 99214 OFFICE O/P EST MOD 30 MIN: CPT | Performed by: FAMILY MEDICINE

## 2025-06-02 PROCEDURE — 83036 HEMOGLOBIN GLYCOSYLATED A1C: CPT

## 2025-06-02 PROCEDURE — 36415 COLL VENOUS BLD VENIPUNCTURE: CPT

## 2025-06-02 RX ORDER — NORTRIPTYLINE HYDROCHLORIDE 10 MG/1
20 CAPSULE ORAL NIGHTLY
Qty: 180 CAPSULE | Refills: 1 | Status: SHIPPED | OUTPATIENT
Start: 2025-06-02

## 2025-06-02 RX ORDER — INDOMETHACIN 25 MG/1
CAPSULE ORAL
COMMUNITY
Start: 2025-05-04

## 2025-06-02 RX ORDER — MONTELUKAST SODIUM 10 MG/1
10 TABLET ORAL NIGHTLY
Qty: 90 TABLET | Refills: 1 | Status: SHIPPED | OUTPATIENT
Start: 2025-06-02 | End: 2025-11-29

## 2025-06-02 NOTE — PROGRESS NOTES
Patient ID: Jocelyne Bernabe is a 65 year old female.         The following individual(s) verbally consented to be recorded using ambient AI listening technology and understand that they can each withdraw their consent to this listening technology at any point by asking the clinician to turn off or pause the recording:    Patient name: Jocelyne Bernabe  Additional names:           HPI  Chief Complaint   Patient presents with    Follow - Up     Gout     Diabetes     F/u       History of Present Illness  Jocelyne Bernabe is a 65 year old female with diabetes who presents for follow-up regarding her diabetes management.    Her last hemoglobin A1c was 6.8% on December 16, 2024. Initially, metformin seemed effective, but she is now experiencing weight gain and increased cravings for sweets. She takes metformin extended-release, 500 mg once daily in the morning. She is concerned that quitting smoking may be contributing to her weight gain.    She has a history of left wrist pain and swelling that occurred in early May, treated with indomethacin 25 mg three times daily as needed with food. The pain and swelling have since resolved. She describes the wrist as being 'a little bit swollen' and 'tender' at the time of the episode. She is uncertain about having gout but was treated for it by an external physician.  No uric acid lab was done but the indomethacin did definitely help.    She experiences excessive sweating, particularly around the neck and face, with episodes occurring three to four times a day. This has been happening for about a year, despite having gone through menopause ten years ago.    She reports bladder hesitancy, stating that she has to wait until the urge to urinate is strong before she can go. She mentions a previous referral to urology for this issue but did not follow up due to other commitments. She is currently on Medicare and is in the process of obtaining a supplemental insurance plan.    Patient denies  any chest pain, shortness breath, diaphoresis, dizziness, hypoglycemia, numbness or tingling in the legs.      Wt Readings from Last 6 Encounters:   25 182 lb (82.6 kg)   25 173 lb 3.2 oz (78.6 kg)   24 176 lb 3.2 oz (79.9 kg)   24 174 lb (78.9 kg)   24 174 lb (78.9 kg)   24 175 lb (79.4 kg)       BMI Readings from Last 6 Encounters:   25 29.38 kg/m²   25 27.96 kg/m²   24 28.44 kg/m²   24 28.08 kg/m²   24 28.08 kg/m²   24 28.25 kg/m²       BP Readings from Last 6 Encounters:   25 108/58   24 96/64   24 106/62   24 130/80   24 123/60   24 105/73       Results  LABS  Hemoglobin A1c: 6.8% (2024)  WBC: 10.9 x 10^3/µL (2025)    Review of Systems  No exertional cardiac chest pain or shortness of breath unless stated in HPI which would take precedence.  See HPI for further review of systems.        Medical History:      Past Medical History:    Diabetes (HCC)    Hyperlipidemia     (normal spontaneous vaginal delivery) ([facility])    2       Past Surgical History:   Procedure Laterality Date    Appendectomy      Back surgery      low back surgery for herniated disc.     Laparoscopic cholecystectomy      Sinus surgery        nasal sinus surgery    Tonsillectomy  1967    and adenoidectomy          Current Outpatient Medications   Medication Sig Dispense Refill    indomethacin 25 MG Oral Cap TAKE 1 CAPSULE BY MOUTH THREE TIMES DAILY WITH FOOD OR MILK FOR 10 DAYS      acyclovir 5 % External Ointment 1 application 3-4 times daily on the area of outbreak for 1 week. 30 g 1    valACYclovir (VALTREX) 500 MG Oral Tab Take 1 tablet (500 mg total) by mouth daily. 30 tablet 0    rosuvastatin 20 MG Oral Tab Take 1 tablet (20 mg total) by mouth nightly. 90 tablet 3    lisinopril 5 MG Oral Tab Take 1 tablet (5 mg total) by mouth daily. 90 tablet 3    mupirocin 2 % External Ointment Apply to affect lesions  twice daily for 5 days 30 g 0    benzonatate (TESSALON PERLES) 100 MG Oral Cap Take 1 capsule (100 mg total) by mouth 3 (three) times daily as needed for cough. 30 capsule 0    promethazine-dextromethorphan 6.25-15 MG/5ML Oral Syrup Take 5 mL by mouth 4 (four) times daily as needed for cough. 120 mL 0    methylPREDNISolone (MEDROL) 4 MG Oral Tablet Therapy Pack As directed. 1 each 0    nortriptyline (PAMELOR) 10 MG Oral Cap Take 2 capsules (20 mg total) by mouth nightly. To help sleep. 180 capsule 1    ondansetron (ZOFRAN) 4 mg tablet Take 1 tablet (4 mg total) by mouth every 8 (eight) hours as needed. 20 tablet 0    albuterol 108 (90 Base) MCG/ACT Inhalation Aero Soln Inhale 2 puffs into the lungs every 4 (four) hours as needed for Wheezing or Shortness of Breath. 18 g 0    levocetirizine 5 MG Oral Tab Take 1 tablet (5 mg total) by mouth every morning. For allergies. 90 tablet 1    azelastine 0.1 % Nasal Solution 2 sprays by Nasal route 2 (two) times daily. Squeeze nose after sprays and do not snort it back into throat. 3 each 1    metFORMIN  MG Oral Tablet 24 Hr Take 1 tablet (500 mg total) by mouth daily. For elevated sugar 90 tablet 3    Benzocaine-Menthol (CEPACOL) 15-2.3 MG Mouth/Throat Lozenge Use as directed 1 lozenge in the mouth or throat as needed (sore throat). (Patient not taking: Reported on 6/2/2025) 16 lozenge 0    ibuprofen 600 MG Oral Tab Take 1 tablet (600 mg total) by mouth every 8 (eight) hours as needed for Pain or Fever. 30 tablet 0    Omeprazole 40 MG Oral Capsule Delayed Release Take 1 capsule (40 mg total) by mouth daily. 90 capsule 1    clobetasol 0.05 % External Cream Apply 1 Application  topically 2 (two) times daily. (Patient not taking: Reported on 6/2/2025) 45 g 0     Allergies:  Allergies   Allergen Reactions    Shellfish Allergy SWELLING        Physical Exam:       Physical Exam  Height 5' 6\" (1.676 m), weight 182 lb (82.6 kg), not currently breastfeeding.  Vitals:    06/02/25  1345   BP: 112/76   Pulse: 93   Temp: 97.4 °F (36.3 °C)   TempSrc: Tympanic   Weight: 182 lb (82.6 kg)   Height: 5' 6\" (1.676 m)          Physical Exam   Constitutional: Patient is oriented to person, place, and time. Patient appears well-developed and well-nourished. No distress.   HENT:   Head: Normocephalic and atraumatic.   Neck: Normal range of motion. Neck supple. No thyromegaly present.   Lymphadenopathy:     Has no cervical adenopathy.   Cardiovascular: Normal rate, regular rhythm and no murmur heard.  Pulmonary/Chest: Effort normal and breath sounds normal. No respiratory distress.   Neurological: Patient is alert and oriented to person, place, and time.   Skin: Skin is warm and dry.  No pallor.  She has some sweat around the neck but nothing excessive.  There is no beads of sweat on her face or dripping down her chest.  Psychiatric: Patient has a normal mood and affect.   Legs: No pitting edema.    Nursing note and vitals reviewed.      Physical Exam  NECK: No cervical lymphadenopathy  CHEST: Clear to auscultation bilaterally  CARDIOVASCULAR: Normal heart sounds, S1 and S2 without murmurs  EXTREMITIES: 2+ pedal pulses bilaterally  MUSCULOSKELETAL: Left wrist normal.  She has full range of motion and no swelling or tenderness.      Assessment/Plan:        Diagnoses and all orders for this visit:    Pain and swelling of left wrist  -     Uric Acid; Future  Resolved.  I would like to do a uric acid considering that she needs to do other labs as well.  Leukocytosis, unspecified type  -     CBC With Differential With Platelet; Future  Reviewed the labs from the other provider.  White blood cell count was minimally elevated.  The rest of the CBC was normal but we will check that today as well.  Type 2 diabetes mellitus without complication, without long-term current use of insulin (HCC)  -     Hemoglobin A1C; Future  -     Basic Metabolic Panel (8); Future  -     Assay, Thyroid Stim Hormone; Future    Sweating  abnormality  -     Assay, Thyroid Stim Hormone; Future  We will do a thyroid but she does not have excessive hyperhidrosis.  Urinary hesitancy  -     UROLOGY - INTERNAL  Never did see urology.  I went ahead and did a referral again.  Anxiety  -     nortriptyline (PAMELOR) 10 MG Oral Cap; Take 2 capsules (20 mg total) by mouth nightly. To help sleep.    Psychophysiological insomnia  -     nortriptyline (PAMELOR) 10 MG Oral Cap; Take 2 capsules (20 mg total) by mouth nightly. To help sleep.    Other allergic rhinitis  -     montelukast (SINGULAIR) 10 MG Oral Tab; Take 1 tablet (10 mg total) by mouth nightly.        Referrals (if applicable)  Orders Placed This Encounter   Procedures    UROLOGY - INTERNAL     Referral Priority:   Routine     Referral Type:   OFFICE VISIT     Referred to Provider:   Radha Damon MD     Requested Specialty:   UROLOGY     Number of Visits Requested:   3         Follow up if symptoms persist.  Take medicine (if given) as prescribed.  Approach to treatment discussed and patient/family member understands and agrees to plan.     No follow-ups on file.      Assessment & Plan  Type 2 diabetes mellitus without complications  Type 2 diabetes mellitus without complications. Last hemoglobin A1c was 6.8% on December 16, 2024. She reports weight gain and increased craving for sweets, possibly related to smoking cessation. Current medication is metformin  mg daily. She expresses concern about kidney function and adequacy of current metformin dosage.  - Order hemoglobin A1c to assess current glycemic control.  - Order kidney function tests to monitor renal status due to metformin use.    Left wrist pain and swelling  Left wrist pain and swelling, possibly due to gout. Symptoms resolved with indomethacin 25 mg three times daily as needed. No prior history of gout. Left wrist currently appears normal with no swelling or pain. Indomethacin was effective in resolving symptoms, but uric acid  levels need to be checked to confirm gout diagnosis.  - Order uric acid test to evaluate for gout.    Bladder hesitancy  Bladder hesitancy, previously evaluated with a referral to urology in September 2024. She did not follow up with urology due to personal reasons. Currently experiencing bladder hesitancy symptoms.  - Re-refer to urology for further evaluation and management of bladder hesitancy.    Hyperhidrosis  Hyperhidrosis, primarily affecting the neck and face, with episodes occurring three to four times daily. She reports increased sweating over the past year. No current thyroid medication. Last thyroid test was in July of the previous year.  - Order thyroid function tests to evaluate for potential thyroid dysfunction.    Ronaldo Foss,   6/2/2025

## 2025-06-05 ENCOUNTER — PATIENT MESSAGE (OUTPATIENT)
Dept: FAMILY MEDICINE CLINIC | Facility: CLINIC | Age: 65
End: 2025-06-05

## 2025-06-05 DIAGNOSIS — E83.52 SERUM CALCIUM ELEVATED: Primary | ICD-10-CM

## 2025-06-23 ENCOUNTER — TELEPHONE (OUTPATIENT)
Dept: FAMILY MEDICINE CLINIC | Facility: CLINIC | Age: 65
End: 2025-06-23

## (undated) NOTE — LETTER
12/28/2021          To Whom It May Concern:    Barbi Bhandari is currently under my medical care and may not return to work at this time. Please excuse Amisha Henderson for 3 days. If her test is negative, then she can return back to work.   If her test is posi

## (undated) NOTE — LETTER
Date & Time: 6/21/2024, 8:31 AM  Patient: Jocelyne Bernabe  Encounter Provider(s):    Renu Jimenez PA-C       To Whom It May Concern:    Jocelyne Bernabe was seen and treated in our department on 6/21/2024. She can return to work 6/24/2024.    If you have any questions or concerns, please do not hesitate to call.        _____________________________  Physician/APC Signature

## (undated) NOTE — ED AVS SNAPSHOT
Elaina Hamilton   MRN: [de-identified]    Department:  [facility] Emergency Department   Date of Visit:  2/15/2020           Disclosure     Insurance plans vary and the physician(s) referred by the ER may not be covered by your plan.  Please contact y CARE PHYSICIAN AT ONCE OR RETURN IMMEDIATELY TO THE EMERGENCY DEPARTMENT. If you have been prescribed any medication(s), please fill your prescription right away and begin taking the medication(s) as directed.   If you believe that any of the medications

## (undated) NOTE — ED AVS SNAPSHOT
Lily Willingham   MRN: [de-identified]    Department:  [facility] Emergency Department   Date of Visit:  3/6/2018           Disclosure     Insurance plans vary and the physician(s) referred by the ER may not be covered by your plan.  Please contact yo CARE PHYSICIAN AT ONCE OR RETURN IMMEDIATELY TO THE EMERGENCY DEPARTMENT. If you have been prescribed any medication(s), please fill your prescription right away and begin taking the medication(s) as directed.   If you believe that any of the medications

## (undated) NOTE — LETTER
03/18/19        Carole Bruce  [address]  [address] [facility] [address] Jerica Zepeda records indicate that you have outstanding lab work and or testing that was ordered for you and has not yet been completed:  Orders Placed This [place]

## (undated) NOTE — LETTER
1/6/2022              Yolis Olson        [address]         To whom it may concern,    Yolis Olson is currently a patient under my medical care.   Patient was positive for COVID-19 and had to be off of work on January 1, 2

## (undated) NOTE — LETTER
10/02/19        Fadi Lozada  [address]      Dear Mario Alberto Vee,    Our records indicate that you have outstanding lab work and or testing that was ordered for you and has not yet been completed:  Orders Placed This Encounter      Mammo Diagnostic TOMOsynthesis BILATERAL    To provide you with the best possible care, please complete these orders at your earliest convenience. If you have recently completed these orders please disregard this letter. If you have any questions please call the office at Dept: [phone]. Thank you,       Isamar Argueta.  Ashwini Reyes MD

## (undated) NOTE — LETTER
1/20/2022              Edinson Baker        [address]         To whom it may concern,    Edinson Baker is currently a patient under my medical care. Patient was seen today and is still ill.   She will have to be off this wee

## (undated) NOTE — LETTER
3/12/2018              [address] [place] [address], [address]        [place] [id number]         To whom it may concern,    Irasema Davis is currently a patient under my medical care.   She sustained a head injury with subsequent headache on the

## (undated) NOTE — LETTER
Date & Time: 12/21/2022, 9:21 AM  Patient: Basilia Maloney  Encounter Provider(s):    CARLENE Chavez       To Whom It May Concern:    Basilia Maloney was seen and treated in our department on 12/21/2022. She should not return to work until Saturday, December 24, 2022.     If you have any questions or concerns, please do not hesitate to call.        _____________________________  Physician/APC Signature

## (undated) NOTE — LETTER
3/12/2018              [address] [place] [address], [address]        [facility] [id number]         To whom it may concern,    Renata Baez is currently a patient under my medical care. Patient was seen today for her head injury.   It is probably

## (undated) NOTE — LETTER
1/13/2022              Priya Fosters        [address]         To Whom It May Concern,    Priya Grider is currently a patient under my medical care and may not return to work at this time.  Please excuse her on January 15th

## (undated) NOTE — LETTER
3/19/2018              [address] [place] [address], [address]        [facility] [address]         To whom it may concern,    Josh Elliott is currently a patient under my medical care.   Patient was seen today for her head contusion and concussio

## (undated) NOTE — LETTER
4/9/2018              Jocelyne Bernabe        [address], [address]        [place] [address]         To whom it may concern,    Rut Meza is currently a patient under my medical care. I evaluated her today.   Now that she is working part-time sh

## (undated) NOTE — LETTER
Date & Time: 3/24/2023, 4:10 PM  Patient: Fadi Lozada  Encounter Provider(s):    CARLENE Shabazz       To Whom It May Concern:    Fadi Lozada was seen and treated in our department on 3/24/2023. Please excuse from work today.   If you have any questions or concerns, please do not hesitate to call.        _____________________________  Physician/APC Signature

## (undated) NOTE — LETTER
3/12/2018              [address] [place] [address], [address]        [facility] [address]         To whom it may concern,    Cordelia Raines is currently a patient under my medical care.   She sustained a head injury with subsequent headache on the

## (undated) NOTE — LETTER
12/2/2024              Jocelyne Bernabe        [address] [facility] [address]         To whom it may concern,    Jocelyne Bernabe is currently a patient under my medical care.  Patient has been ill since November 25, 2024 and has been unable to work due to diagnosis of pneumonia.  I saw her today and she continues to cough considerably.  She will need to be off of work through December 8, 2024 and can go back to work regular duty without restrictions December 9, 2024.  If you require additional information please do not hesitate to contact my office.        Sincerely,     Ronaldo Foss DO  [facility] [address] [address] [phone]        Document electronically generated by:  Ronaldo Foss DO

## (undated) NOTE — LETTER
March 6, 2018    Patient: Jennifer Roberts   Date of Visit: 3/6/2018       To Whom It May Concern:    Jennifer Roebrts was seen and treated in our emergency department on 3/6/2018. She can return to work 03/08/2018.     If you have any questions or concerns

## (undated) NOTE — LETTER
March 21, 2018    Niya Apple DO  [address]  [address] [place]     Patient: Irasema Davis   YOB: 1960   Date of Visit: 3/21/2018       Dear Dr. Ilene Floyd DO:    Thank you for referring Irasema Davis to me for evaluati PredniSONE 5 MG (21) Oral Tablet Therapy Pack Take as directed Disp:  Rfl: 0   Diclofenac Sodium 75 MG Oral Tab EC Take 1 tablet (75 mg total) by mouth 2 (two) times daily. Take with meals. (for pain/inflammation).  Disp: 42 tablet Rfl: 0   Amitriptyline HC Right Left    Disc Good rim, Temporal crescent Good rim, Temporal crescent    C/D Ratio 0.3 0.3    Macula Normal Normal    Vessels Normal Normal    Periphery Normal Normal            Refraction     Wearing Rx       Sphere Cylinder Axis    Right +1.75 +0

## (undated) NOTE — LETTER
3/7/2018          To Whom It May Concern:    Carmine Caraballo is currently under my medical care and may not return to work at this time. Please excuse Sandee Shaw for 5 days. She may return to work on March 12, 2018.   Activity is restricted as follows: non

## (undated) NOTE — LETTER
1/30/2018          To Whom It May Concern:    Jeni Means is currently under my medical care and may not return to work at this time. Please excuse Brennan Lentz for 3 days. She may return to work on 02/02/2018. Activity is restricted as follows: none.

## (undated) NOTE — LETTER
Date & Time: 5/27/2024, 9:52 AM  Patient: Jocelyne Bernabe  Encounter Provider(s):    Davis Guzman APRN       To Whom It May Concern:    Jocelyne Bernabe was seen and treated in our department on 5/27/2024. Please excuse her from work tomorrow.  If you have any questions or concerns, please do not hesitate to call.        _____________________________  Physician/APC Signature

## (undated) NOTE — LETTER
Date & Time: 10/25/2023, 2:40 PM  Patient: Panda Velasquez  Encounter Provider(s):    Jameson Cheadle, APRN       To Whom It May Concern:    Panda Velasquez was seen and treated in our department on 10/25/2023. She can return to work tomorrow 10/26/2023. Please excuse her from work today. If you have any questions or concerns, please do not hesitate to call.       FAIZA Cox  _____________________________  HFWUVEZST/AUSTYNG Signature